# Patient Record
Sex: FEMALE | Race: WHITE | Employment: OTHER | URBAN - METROPOLITAN AREA
[De-identification: names, ages, dates, MRNs, and addresses within clinical notes are randomized per-mention and may not be internally consistent; named-entity substitution may affect disease eponyms.]

---

## 2018-05-01 ENCOUNTER — HOSPITAL ENCOUNTER (EMERGENCY)
Age: 83
Discharge: HOME | End: 2018-05-01
Attending: EMERGENCY MEDICINE
Payer: MEDICARE

## 2018-05-01 VITALS
RESPIRATION RATE: 16 BRPM | HEART RATE: 82 BPM | HEIGHT: 63 IN | WEIGHT: 165 LBS | SYSTOLIC BLOOD PRESSURE: 160 MMHG | DIASTOLIC BLOOD PRESSURE: 88 MMHG | BODY MASS INDEX: 29.23 KG/M2 | TEMPERATURE: 97.8 F | OXYGEN SATURATION: 93 %

## 2018-05-01 DIAGNOSIS — N30.01 ACUTE CYSTITIS WITH HEMATURIA: Primary | ICD-10-CM

## 2018-05-01 LAB
BACTERIA: ABNORMAL /HPF
BILIRUBIN URINE: NEGATIVE
BLOOD, URINE: ABNORMAL
CLARITY: ABNORMAL
COLOR: ABNORMAL
GLUCOSE URINE: NEGATIVE MG/DL
KETONES, URINE: NEGATIVE MG/DL
LEUKOCYTE ESTERASE, URINE: ABNORMAL
NITRITE, URINE: NEGATIVE
PH UA: 5.5 (ref 5–9)
PROTEIN UA: 30 MG/DL
RBC UA: ABNORMAL /HPF (ref 0–2)
SPECIFIC GRAVITY UA: 1.02 (ref 1–1.03)
UROBILINOGEN, URINE: 0.2 E.U./DL
WBC UA: ABNORMAL /HPF (ref 0–5)

## 2018-05-01 PROCEDURE — 99283 EMERGENCY DEPT VISIT LOW MDM: CPT

## 2018-05-01 PROCEDURE — 81001 URINALYSIS AUTO W/SCOPE: CPT

## 2018-05-01 RX ORDER — CEFDINIR 300 MG/1
300 CAPSULE ORAL 2 TIMES DAILY
Qty: 20 CAPSULE | Refills: 0 | Status: SHIPPED | OUTPATIENT
Start: 2018-05-01 | End: 2018-05-11

## 2018-05-01 RX ORDER — PHENAZOPYRIDINE HYDROCHLORIDE 100 MG/1
200 TABLET, FILM COATED ORAL 3 TIMES DAILY PRN
Qty: 9 TABLET | Refills: 0 | Status: SHIPPED | OUTPATIENT
Start: 2018-05-01 | End: 2018-05-04

## 2018-05-01 ASSESSMENT — PAIN DESCRIPTION - FREQUENCY
FREQUENCY: INTERMITTENT
FREQUENCY: INTERMITTENT

## 2018-05-01 ASSESSMENT — PAIN SCALES - GENERAL
PAINLEVEL_OUTOF10: 7
PAINLEVEL_OUTOF10: 7

## 2018-05-01 ASSESSMENT — PAIN DESCRIPTION - DESCRIPTORS
DESCRIPTORS: CRAMPING
DESCRIPTORS: CRAMPING

## 2018-05-01 ASSESSMENT — PAIN DESCRIPTION - PAIN TYPE
TYPE: ACUTE PAIN
TYPE: ACUTE PAIN

## 2018-08-20 ENCOUNTER — HOSPITAL ENCOUNTER (OUTPATIENT)
Age: 83
Discharge: HOME | End: 2018-08-22
Payer: MEDICARE

## 2018-08-20 PROCEDURE — 88112 CYTOPATH CELL ENHANCE TECH: CPT

## 2018-09-06 ENCOUNTER — OFFICE VISIT (OUTPATIENT)
Dept: VASCULAR SURGERY | Age: 83
End: 2018-09-06
Payer: MEDICARE

## 2018-09-06 DIAGNOSIS — I70.1 LEFT RENAL ARTERY STENOSIS (HCC): ICD-10-CM

## 2018-09-06 PROCEDURE — 99204 OFFICE O/P NEW MOD 45 MIN: CPT | Performed by: SURGERY

## 2018-09-06 NOTE — PROGRESS NOTES
Social History Narrative    No narrative on file       Review of Systems:  Skin:  No abnormal pigmentation or rash  Eyes:  No blurring, diplopia or vision loss  Ears/Nose/Throat:  No hearing loss or vertigo  Respiratory:  No cough, pleuritic chest pain, dyspnea, or wheezing  Cardiovascular: No angina, palpitations   Gastrointestinal:  No nausea or vomiting; no abdominal pain or rectal bleeding  Musculoskeletal:  No arthritis or weakness  Neurologic:  No paralysis, paresis, paresthesia, seizures or headaches  Hematologic/Lymphatic/Immunologic:  No anemia, abnormal bleeding/bruising, fever, chills or night sweats. Endocrine:  No heat or cold intolerance. No polyphagia, polydipsia or polyuria. Physical Exam:  General appearance:  Alert, awake, oriented x 3. No distress. Skin:  Warm and dry  Head:  Normocephalic. No masses, lesions or tenderness  Eyes:  Conjunctivae appear normal; PERRL  Ears:  External ears normal  Nose/Sinuses:  Septum midline, mucosa normal; no drainage  Oropharynx:  Clear, no exudate noted  Neck:  No jugular venous distention, lymphadenopathy or thyromegaly. No evidence of carotid bruit  Lungs:  Clear to ausculation bilaterally. No rhonchi, crackles, wheezes  Heart:  Regular rate and rhythm. No rub or murmur  Abdomen:  Soft, non-tender. No masses, organomegaly. Musculoskeletal : No joint effusions, tenderness swelling    Neuro: Speech is intact. Moving all extremities. No focal motor or sensory deficits      Extremities:  Both feet are warm to touch. The color of both feet is normal.        Pulses Right  Left    Brachial 3 3    Radial    3=normal   Femoral 2 2  2=diminished   Popliteal    1=barely palpable   Dorsalis pedis    0=absent   Posterior tibial    4=aneurysmal             Other pertinent information:1. The past medical records were reviewed.     2.  The renal artery ultrasound done at [facility] imaging was personally reviewed by me the findings are consistent with

## 2018-10-08 ENCOUNTER — HOSPITAL ENCOUNTER (OUTPATIENT)
Age: 83
Discharge: HOME | End: 2018-10-10
Payer: MEDICARE

## 2018-10-08 PROCEDURE — 87088 URINE BACTERIA CULTURE: CPT

## 2018-10-10 LAB — URINE CULTURE, ROUTINE: NORMAL

## 2019-12-11 ENCOUNTER — HOSPITAL ENCOUNTER (OUTPATIENT)
Dept: MAMMOGRAPHY | Age: 84
Discharge: HOME | End: 2019-12-13
Payer: MEDICARE

## 2019-12-11 DIAGNOSIS — Z13.820 ENCOUNTER FOR IMAGING TO ASSESS OSTEOPOROSIS: ICD-10-CM

## 2019-12-11 PROCEDURE — 77080 DXA BONE DENSITY AXIAL: CPT

## 2020-06-06 ENCOUNTER — APPOINTMENT (OUTPATIENT)
Dept: CT IMAGING | Age: 85
End: 2020-06-06
Payer: MEDICARE

## 2020-06-06 ENCOUNTER — HOSPITAL ENCOUNTER (OUTPATIENT)
Age: 85
Setting detail: OBSERVATION
Discharge: HOME | End: 2020-06-07
Attending: EMERGENCY MEDICINE | Admitting: FAMILY MEDICINE
Payer: MEDICARE

## 2020-06-06 ENCOUNTER — APPOINTMENT (OUTPATIENT)
Dept: GENERAL RADIOLOGY | Age: 85
End: 2020-06-06
Payer: MEDICARE

## 2020-06-06 PROBLEM — R09.02 HYPOXIA: Status: ACTIVE | Noted: 2020-06-06

## 2020-06-06 LAB
ADENOVIRUS BY PCR: NOT DETECTED
ANION GAP SERPL CALCULATED.3IONS-SCNC: 10 MMOL/L (ref 7–16)
BACTERIA: ABNORMAL /HPF
BASOPHILS ABSOLUTE: 0.02 E9/L (ref 0–0.2)
BASOPHILS RELATIVE PERCENT: 0.3 % (ref 0–2)
BILIRUBIN URINE: NEGATIVE
BLOOD, URINE: ABNORMAL
BORDETELLA PARAPERTUSSIS BY PCR: NOT DETECTED
BORDETELLA PERTUSSIS BY PCR: NOT DETECTED
BUN BLDV-MCNC: 37 MG/DL (ref 8–23)
CALCIUM SERPL-MCNC: 11 MG/DL (ref 8.6–10.2)
CHLAMYDOPHILIA PNEUMONIAE BY PCR: NOT DETECTED
CHLORIDE BLD-SCNC: 100 MMOL/L (ref 98–107)
CLARITY: CLEAR
CO2: 27 MMOL/L (ref 22–29)
COLOR: YELLOW
CORONAVIRUS 229E BY PCR: NOT DETECTED
CORONAVIRUS HKU1 BY PCR: NOT DETECTED
CORONAVIRUS NL63 BY PCR: NOT DETECTED
CORONAVIRUS OC43 BY PCR: NOT DETECTED
CREAT SERPL-MCNC: 0.9 MG/DL (ref 0.5–1)
EOSINOPHILS ABSOLUTE: 0.13 E9/L (ref 0.05–0.5)
EOSINOPHILS RELATIVE PERCENT: 2 % (ref 0–6)
GFR AFRICAN AMERICAN: 52
GFR AFRICAN AMERICAN: >60
GFR NON-AFRICAN AMERICAN: 43 ML/MIN/1.73
GFR NON-AFRICAN AMERICAN: 60 ML/MIN/1.73
GLUCOSE BLD-MCNC: 115 MG/DL (ref 74–99)
GLUCOSE BLD-MCNC: 117 MG/DL (ref 74–99)
GLUCOSE URINE: NEGATIVE MG/DL
HCT VFR BLD CALC: 48.1 % (ref 34–48)
HEMOGLOBIN: 15.7 G/DL (ref 11.5–15.5)
HUMAN METAPNEUMOVIRUS BY PCR: NOT DETECTED
HUMAN RHINOVIRUS/ENTEROVIRUS BY PCR: NOT DETECTED
IMMATURE GRANULOCYTES #: 0.02 E9/L
IMMATURE GRANULOCYTES %: 0.3 % (ref 0–5)
INFLUENZA A BY PCR: NOT DETECTED
INFLUENZA B BY PCR: NOT DETECTED
KETONES, URINE: NEGATIVE MG/DL
LEUKOCYTE ESTERASE, URINE: ABNORMAL
LYMPHOCYTES ABSOLUTE: 2.18 E9/L (ref 1.5–4)
LYMPHOCYTES RELATIVE PERCENT: 33 % (ref 20–42)
MCH RBC QN AUTO: 29.5 PG (ref 26–35)
MCHC RBC AUTO-ENTMCNC: 32.6 % (ref 32–34.5)
MCV RBC AUTO: 90.2 FL (ref 80–99.9)
METER GLUCOSE: 107 MG/DL (ref 74–99)
MONOCYTES ABSOLUTE: 0.53 E9/L (ref 0.1–0.95)
MONOCYTES RELATIVE PERCENT: 8 % (ref 2–12)
MYCOPLASMA PNEUMONIAE BY PCR: NOT DETECTED
NEUTROPHILS ABSOLUTE: 3.72 E9/L (ref 1.8–7.3)
NEUTROPHILS RELATIVE PERCENT: 56.4 % (ref 43–80)
NITRITE, URINE: NEGATIVE
PARAINFLUENZA VIRUS 1 BY PCR: NOT DETECTED
PARAINFLUENZA VIRUS 2 BY PCR: NOT DETECTED
PARAINFLUENZA VIRUS 3 BY PCR: NOT DETECTED
PARAINFLUENZA VIRUS 4 BY PCR: NOT DETECTED
PDW BLD-RTO: 12.8 FL (ref 11.5–15)
PERFORMED ON: ABNORMAL
PH UA: 7 (ref 5–9)
PLATELET # BLD: 245 E9/L (ref 130–450)
PMV BLD AUTO: 10.8 FL (ref 7–12)
POC CHLORIDE: 107 MMOL/L (ref 100–108)
POC CREATININE: 1.2 MG/DL (ref 0.5–1)
POC POTASSIUM: 5 MMOL/L (ref 3.5–5)
POC SODIUM: 141 MMOL/L (ref 132–146)
POTASSIUM REFLEX MAGNESIUM: 4.2 MMOL/L (ref 3.5–5)
PRO-BNP: 725 PG/ML (ref 0–450)
PROTEIN UA: 100 MG/DL
RBC # BLD: 5.33 E12/L (ref 3.5–5.5)
RBC UA: ABNORMAL /HPF (ref 0–2)
RESPIRATORY SYNCYTIAL VIRUS BY PCR: NOT DETECTED
SARS-COV-2, NAAT: NOT DETECTED
SODIUM BLD-SCNC: 137 MMOL/L (ref 132–146)
SPECIFIC GRAVITY UA: 1.02 (ref 1–1.03)
TROPONIN: <0.01 NG/ML (ref 0–0.03)
UROBILINOGEN, URINE: 0.2 E.U./DL
WBC # BLD: 6.6 E9/L (ref 4.5–11.5)
WBC UA: ABNORMAL /HPF (ref 0–5)

## 2020-06-06 PROCEDURE — 85025 COMPLETE CBC W/AUTO DIFF WBC: CPT

## 2020-06-06 PROCEDURE — 84295 ASSAY OF SERUM SODIUM: CPT

## 2020-06-06 PROCEDURE — 94760 N-INVAS EAR/PLS OXIMETRY 1: CPT

## 2020-06-06 PROCEDURE — 71045 X-RAY EXAM CHEST 1 VIEW: CPT

## 2020-06-06 PROCEDURE — 82947 ASSAY GLUCOSE BLOOD QUANT: CPT

## 2020-06-06 PROCEDURE — 82962 GLUCOSE BLOOD TEST: CPT

## 2020-06-06 PROCEDURE — 96374 THER/PROPH/DIAG INJ IV PUSH: CPT

## 2020-06-06 PROCEDURE — 99285 EMERGENCY DEPT VISIT HI MDM: CPT

## 2020-06-06 PROCEDURE — 71275 CT ANGIOGRAPHY CHEST: CPT

## 2020-06-06 PROCEDURE — 0100U HC RESPIRPTHGN MULT REV TRANS & AMP PRB TECH 21 TRGT: CPT

## 2020-06-06 PROCEDURE — 84132 ASSAY OF SERUM POTASSIUM: CPT

## 2020-06-06 PROCEDURE — G0378 HOSPITAL OBSERVATION PER HR: HCPCS

## 2020-06-06 PROCEDURE — 81001 URINALYSIS AUTO W/SCOPE: CPT

## 2020-06-06 PROCEDURE — 6360000002 HC RX W HCPCS: Performed by: STUDENT IN AN ORGANIZED HEALTH CARE EDUCATION/TRAINING PROGRAM

## 2020-06-06 PROCEDURE — 82565 ASSAY OF CREATININE: CPT

## 2020-06-06 PROCEDURE — U0002 COVID-19 LAB TEST NON-CDC: HCPCS

## 2020-06-06 PROCEDURE — 83880 ASSAY OF NATRIURETIC PEPTIDE: CPT

## 2020-06-06 PROCEDURE — 84484 ASSAY OF TROPONIN QUANT: CPT

## 2020-06-06 PROCEDURE — 6360000002 HC RX W HCPCS: Performed by: EMERGENCY MEDICINE

## 2020-06-06 PROCEDURE — 96375 TX/PRO/DX INJ NEW DRUG ADDON: CPT

## 2020-06-06 PROCEDURE — 73070 X-RAY EXAM OF ELBOW: CPT

## 2020-06-06 PROCEDURE — 80048 BASIC METABOLIC PNL TOTAL CA: CPT

## 2020-06-06 PROCEDURE — 70450 CT HEAD/BRAIN W/O DYE: CPT

## 2020-06-06 PROCEDURE — 82435 ASSAY OF BLOOD CHLORIDE: CPT

## 2020-06-06 PROCEDURE — 6360000004 HC RX CONTRAST MEDICATION: Performed by: RADIOLOGY

## 2020-06-06 PROCEDURE — 72125 CT NECK SPINE W/O DYE: CPT

## 2020-06-06 PROCEDURE — 93005 ELECTROCARDIOGRAM TRACING: CPT | Performed by: STUDENT IN AN ORGANIZED HEALTH CARE EDUCATION/TRAINING PROGRAM

## 2020-06-06 RX ORDER — LOSARTAN POTASSIUM 50 MG/1
100 TABLET ORAL DAILY
Status: DISCONTINUED | OUTPATIENT
Start: 2020-06-07 | End: 2020-06-07 | Stop reason: HOSPADM

## 2020-06-06 RX ORDER — FENTANYL CITRATE 50 UG/ML
25 INJECTION, SOLUTION INTRAMUSCULAR; INTRAVENOUS ONCE
Status: COMPLETED | OUTPATIENT
Start: 2020-06-06 | End: 2020-06-06

## 2020-06-06 RX ORDER — LOSARTAN POTASSIUM AND HYDROCHLOROTHIAZIDE 12.5; 1 MG/1; MG/1
1 TABLET ORAL DAILY
Status: DISCONTINUED | OUTPATIENT
Start: 2020-06-07 | End: 2020-06-06 | Stop reason: CLARIF

## 2020-06-06 RX ORDER — IPRATROPIUM BROMIDE AND ALBUTEROL SULFATE 2.5; .5 MG/3ML; MG/3ML
1 SOLUTION RESPIRATORY (INHALATION)
Status: DISCONTINUED | OUTPATIENT
Start: 2020-06-07 | End: 2020-06-07 | Stop reason: HOSPADM

## 2020-06-06 RX ORDER — FUROSEMIDE 10 MG/ML
20 INJECTION INTRAMUSCULAR; INTRAVENOUS ONCE
Status: COMPLETED | OUTPATIENT
Start: 2020-06-06 | End: 2020-06-06

## 2020-06-06 RX ORDER — LOSARTAN POTASSIUM 100 MG/1
100 TABLET ORAL DAILY
COMMUNITY

## 2020-06-06 RX ORDER — SODIUM CHLORIDE 0.9 % (FLUSH) 0.9 %
10 SYRINGE (ML) INJECTION PRN
Status: DISCONTINUED | OUTPATIENT
Start: 2020-06-06 | End: 2020-06-07 | Stop reason: HOSPADM

## 2020-06-06 RX ORDER — HYDROCHLOROTHIAZIDE 12.5 MG/1
12.5 TABLET ORAL DAILY
Status: ON HOLD | COMMUNITY
End: 2020-10-11 | Stop reason: HOSPADM

## 2020-06-06 RX ORDER — HYDROCHLOROTHIAZIDE 12.5 MG/1
12.5 TABLET ORAL DAILY
Status: DISCONTINUED | OUTPATIENT
Start: 2020-06-07 | End: 2020-06-07 | Stop reason: HOSPADM

## 2020-06-06 RX ADMIN — FENTANYL CITRATE 25 MCG: 50 INJECTION, SOLUTION INTRAMUSCULAR; INTRAVENOUS at 17:02

## 2020-06-06 RX ADMIN — FUROSEMIDE 20 MG: 10 INJECTION, SOLUTION INTRAMUSCULAR; INTRAVENOUS at 20:36

## 2020-06-06 RX ADMIN — IOPAMIDOL 60 ML: 755 INJECTION, SOLUTION INTRAVENOUS at 18:25

## 2020-06-06 ASSESSMENT — ENCOUNTER SYMPTOMS
APNEA: 0
SORE THROAT: 0
BACK PAIN: 0
WHEEZING: 0
SHORTNESS OF BREATH: 0
ABDOMINAL PAIN: 0
NAUSEA: 0
EYE PAIN: 0
TROUBLE SWALLOWING: 0
CONSTIPATION: 0
RHINORRHEA: 0
CHEST TIGHTNESS: 0
PHOTOPHOBIA: 0
DIARRHEA: 0
COUGH: 0
VOMITING: 0

## 2020-06-06 ASSESSMENT — PAIN DESCRIPTION - ONSET
ONSET: ON-GOING
ONSET: ON-GOING

## 2020-06-06 ASSESSMENT — PAIN DESCRIPTION - PAIN TYPE
TYPE: CHRONIC PAIN
TYPE: ACUTE PAIN

## 2020-06-06 ASSESSMENT — PAIN DESCRIPTION - LOCATION
LOCATION: HEAD;ELBOW
LOCATION: BACK

## 2020-06-06 ASSESSMENT — PAIN DESCRIPTION - FREQUENCY
FREQUENCY: CONTINUOUS
FREQUENCY: CONTINUOUS

## 2020-06-06 ASSESSMENT — PAIN DESCRIPTION - ORIENTATION: ORIENTATION: RIGHT;POSTERIOR

## 2020-06-06 ASSESSMENT — PAIN SCALES - GENERAL
PAINLEVEL_OUTOF10: 5
PAINLEVEL_OUTOF10: 10

## 2020-06-06 NOTE — ED PROVIDER NOTES
[address]      Pt Name: Bartolome Chase  MRN: [id number]  [place] 1935  Date of evaluation: 6/6/2020      CHIEF COMPLAINT       Chief Complaint   Patient presents with    Fall     @ [employer], leg gave out, hit head and right elbow, -LOC, -thinners, GCS 15, per ems 86%RA. HPI  Bartolome Chase is a 80 y.o. female with a history of diabetes, hypertension, who presents to the emergency department after a fall. She states that she was walking in her house and suddenly her legs gave out and she fell backwards ground. She states that she may have hit her head and hit her right elbow. Currently complaining of head pain and right elbow pain. The pain is constant, moderate, nothing makes it better or worse, achy, nonradiating. She also complains of mild neck pain. She denies any lightheadedness, vision changes, chest pain preceding the fall. She did not trip just insisting that her bilateral leg suddenly became more weak causing her to go to the ground. She does complain of 1 day of mild shortness of breath. She states this is new for her. EMS apparently found her hypoxic with an oxygen saturation of 88% and placed her on nasal cannula supplemental oxygen. No history of any underlying pulmonary problems. She does not smoke. Denies any leg swelling or leg pain. Except as noted above the remainder of the review of systems was reviewed and negative. Review of Systems   Constitutional: Negative for chills, diaphoresis, fatigue and fever. Reports fall. HENT: Negative for rhinorrhea, sore throat and trouble swallowing. Reports head pain. Eyes: Negative for photophobia and pain. Respiratory: Negative for apnea, cough, chest tightness, shortness of breath and wheezing. Cardiovascular: Negative for chest pain, palpitations and leg swelling.    Gastrointestinal: Negative for abdominal

## 2020-06-07 VITALS
OXYGEN SATURATION: 92 % | DIASTOLIC BLOOD PRESSURE: 76 MMHG | WEIGHT: 163.8 LBS | HEART RATE: 86 BPM | HEIGHT: 68 IN | SYSTOLIC BLOOD PRESSURE: 145 MMHG | RESPIRATION RATE: 18 BRPM | TEMPERATURE: 97.7 F | BODY MASS INDEX: 24.83 KG/M2

## 2020-06-07 LAB
ANION GAP SERPL CALCULATED.3IONS-SCNC: 13 MMOL/L (ref 7–16)
BUN BLDV-MCNC: 35 MG/DL (ref 8–23)
CALCIUM SERPL-MCNC: 10.6 MG/DL (ref 8.6–10.2)
CHLORIDE BLD-SCNC: 100 MMOL/L (ref 98–107)
CO2: 30 MMOL/L (ref 22–29)
CREAT SERPL-MCNC: 1.1 MG/DL (ref 0.5–1)
EKG ATRIAL RATE: 93 BPM
EKG P AXIS: 55 DEGREES
EKG P-R INTERVAL: 168 MS
EKG Q-T INTERVAL: 392 MS
EKG QRS DURATION: 132 MS
EKG QTC CALCULATION (BAZETT): 487 MS
EKG R AXIS: -40 DEGREES
EKG T AXIS: 75 DEGREES
EKG VENTRICULAR RATE: 93 BPM
GFR AFRICAN AMERICAN: 57
GFR NON-AFRICAN AMERICAN: 47 ML/MIN/1.73
GLUCOSE BLD-MCNC: 132 MG/DL (ref 74–99)
HCT VFR BLD CALC: 44.1 % (ref 34–48)
HEMOGLOBIN: 14.5 G/DL (ref 11.5–15.5)
MCH RBC QN AUTO: 29.4 PG (ref 26–35)
MCHC RBC AUTO-ENTMCNC: 32.9 % (ref 32–34.5)
MCV RBC AUTO: 89.5 FL (ref 80–99.9)
PDW BLD-RTO: 12.8 FL (ref 11.5–15)
PLATELET # BLD: 228 E9/L (ref 130–450)
PMV BLD AUTO: 10.5 FL (ref 7–12)
POTASSIUM SERPL-SCNC: 3.5 MMOL/L (ref 3.5–5)
RBC # BLD: 4.93 E12/L (ref 3.5–5.5)
SODIUM BLD-SCNC: 143 MMOL/L (ref 132–146)
WBC # BLD: 7.4 E9/L (ref 4.5–11.5)

## 2020-06-07 PROCEDURE — 2580000003 HC RX 258: Performed by: FAMILY MEDICINE

## 2020-06-07 PROCEDURE — 36415 COLL VENOUS BLD VENIPUNCTURE: CPT

## 2020-06-07 PROCEDURE — 6370000000 HC RX 637 (ALT 250 FOR IP): Performed by: FAMILY MEDICINE

## 2020-06-07 PROCEDURE — 94640 AIRWAY INHALATION TREATMENT: CPT

## 2020-06-07 PROCEDURE — G0378 HOSPITAL OBSERVATION PER HR: HCPCS

## 2020-06-07 PROCEDURE — 85027 COMPLETE CBC AUTOMATED: CPT

## 2020-06-07 PROCEDURE — 99219 PR INITIAL OBSERVATION CARE/DAY 50 MINUTES: CPT | Performed by: INTERNAL MEDICINE

## 2020-06-07 PROCEDURE — 93010 ELECTROCARDIOGRAM REPORT: CPT | Performed by: INTERNAL MEDICINE

## 2020-06-07 PROCEDURE — 80048 BASIC METABOLIC PNL TOTAL CA: CPT

## 2020-06-07 RX ORDER — HYDROCODONE BITARTRATE AND ACETAMINOPHEN 5; 325 MG/1; MG/1
1 TABLET ORAL EVERY 4 HOURS PRN
Status: DISCONTINUED | OUTPATIENT
Start: 2020-06-07 | End: 2020-06-07 | Stop reason: HOSPADM

## 2020-06-07 RX ADMIN — HYDROCHLOROTHIAZIDE 12.5 MG: 12.5 TABLET ORAL at 10:47

## 2020-06-07 RX ADMIN — METFORMIN HYDROCHLORIDE 500 MG: 500 TABLET ORAL at 10:46

## 2020-06-07 RX ADMIN — HYDROCODONE BITARTRATE AND ACETAMINOPHEN 1 TABLET: 5; 325 TABLET ORAL at 03:27

## 2020-06-07 RX ADMIN — SODIUM CHLORIDE, PRESERVATIVE FREE 10 ML: 5 INJECTION INTRAVENOUS at 10:47

## 2020-06-07 RX ADMIN — IPRATROPIUM BROMIDE AND ALBUTEROL SULFATE 1 AMPULE: .5; 3 SOLUTION RESPIRATORY (INHALATION) at 13:51

## 2020-06-07 RX ADMIN — LOSARTAN POTASSIUM 100 MG: 50 TABLET, FILM COATED ORAL at 10:46

## 2020-06-07 RX ADMIN — IPRATROPIUM BROMIDE AND ALBUTEROL SULFATE 1 AMPULE: .5; 3 SOLUTION RESPIRATORY (INHALATION) at 09:34

## 2020-06-07 ASSESSMENT — PAIN DESCRIPTION - PAIN TYPE
TYPE: ACUTE PAIN
TYPE: ACUTE PAIN

## 2020-06-07 ASSESSMENT — PAIN DESCRIPTION - ONSET
ONSET: AWAKENED FROM SLEEP
ONSET: ON-GOING

## 2020-06-07 ASSESSMENT — ENCOUNTER SYMPTOMS
ABDOMINAL PAIN: 0
SHORTNESS OF BREATH: 0

## 2020-06-07 ASSESSMENT — PAIN - FUNCTIONAL ASSESSMENT: PAIN_FUNCTIONAL_ASSESSMENT: ACTIVITIES ARE NOT PREVENTED

## 2020-06-07 ASSESSMENT — PAIN DESCRIPTION - ORIENTATION: ORIENTATION: POSTERIOR

## 2020-06-07 ASSESSMENT — PAIN SCALES - GENERAL
PAINLEVEL_OUTOF10: 5
PAINLEVEL_OUTOF10: 5

## 2020-06-07 ASSESSMENT — PAIN DESCRIPTION - LOCATION
LOCATION: HEAD
LOCATION: GENERALIZED

## 2020-06-07 ASSESSMENT — PAIN DESCRIPTION - FREQUENCY
FREQUENCY: CONTINUOUS
FREQUENCY: INTERMITTENT

## 2020-06-07 ASSESSMENT — PAIN DESCRIPTION - DESCRIPTORS: DESCRIPTORS: CONSTANT;ACHING

## 2020-06-07 ASSESSMENT — PAIN DESCRIPTION - PROGRESSION: CLINICAL_PROGRESSION: GRADUALLY IMPROVING

## 2020-06-07 NOTE — ED NOTES
Ambulated patient with stand by assist and use of walker. Steady gait. Denies Dizziness, SOB, CP.  Spo2 monitored throughout ambulation and dropping to 89% on RA. No s/sx of respiratory distress noted or verbalized. Assisted back into bed with Spo2 increasing to 93% on RA.       Faizan Younger RN  06/06/20 2004

## 2020-06-07 NOTE — PROGRESS NOTES
Dr. John Rawls notified via perfect serve regarding pulmonology signing off, will await orders.     Alok Oliveira RN

## 2020-06-07 NOTE — PROGRESS NOTES
Patient okay to discharge from pulmonology standpoint per Dr. Mera Padilla. Patient to follow up in 3 months.     Cami Maloner, RN

## 2020-06-07 NOTE — H&P
Gloria Rouse is an 80 y.o.  female. Patient says she went to the store yesterday for the first time. She was walking around and says her legs became weak and she fell to the ground and hit her head. She was found in the ER to require 4L oxygen although she has no respiratory problems in her history. Past Medical History:   Diagnosis Date    Diabetes ([address]     Hypertension     Left renal artery stenosis (HCC) 2018    Renal artery stenosis (HCC)      Past Surgical History:   Procedure Laterality Date    BLEPHAROPLASTY Bilateral     CHOLECYSTECTOMY      COLONOSCOPY      EYE SURGERY Right     cataract    HAND SURGERY Bilateral     trigger thumb    HYSTERECTOMY      KNEE SURGERY         No family history on file. Social History     Tobacco Use    Smoking status: Former Smoker     Packs/day: 0.50     Types: Cigarettes     Last attempt to quit: 3/31/2020     Years since quittin.1    Smokeless tobacco: Never Used   Substance Use Topics    Alcohol use: No     Comment: rare    Drug use: No       Current Facility-Administered Medications   Medication Dose Route Frequency Provider Last Rate Last Dose    HYDROcodone-acetaminophen (NORCO) 5-325 MG per tablet 1 tablet  1 tablet Oral Q4H PRN Justen Mendoza MD   1 tablet at 20 0327    sodium chloride flush 0.9 % injection 10 mL  10 mL Intravenous PRN Justen Mendoza MD        metFORMIN (GLUCOPHAGE) tablet 500 mg  500 mg Oral BID WC Justen Mendoza MD        ipratropium-albuterol (DUONEB) nebulizer solution 1 ampule  1 ampule Inhalation Q4H WA Justen Mendoza MD        losartan (COZAAR) tablet 100 mg  100 mg Oral Daily Justen Mendoza MD        And    hydrochlorothiazide (HYDRODIURIL) tablet 12.5 mg  12.5 mg Oral Daily Justen Mendoza MD           Allergies: No Known Allergies    Active Problems:    Hypoxia  Resolved Problems:    * No resolved hospital problems.  *    Blood pressure (!)

## 2020-08-18 ENCOUNTER — OFFICE VISIT (OUTPATIENT)
Dept: ENT CLINIC | Age: 85
End: 2020-08-18
Payer: MEDICARE

## 2020-08-18 VITALS — WEIGHT: 160 LBS | HEIGHT: 63 IN | BODY MASS INDEX: 28.35 KG/M2 | TEMPERATURE: 97.7 F

## 2020-08-18 PROCEDURE — 99204 OFFICE O/P NEW MOD 45 MIN: CPT | Performed by: OTOLARYNGOLOGY

## 2020-08-18 PROCEDURE — 69210 REMOVE IMPACTED EAR WAX UNI: CPT | Performed by: OTOLARYNGOLOGY

## 2020-08-18 RX ORDER — FLUTICASONE PROPIONATE 50 MCG
2 SPRAY, SUSPENSION (ML) NASAL DAILY
Qty: 1 BOTTLE | Refills: 3 | Status: SHIPPED
Start: 2020-08-18 | End: 2022-05-30

## 2020-08-18 ASSESSMENT — ENCOUNTER SYMPTOMS
COLOR CHANGE: 0
EYE DISCHARGE: 0
TROUBLE SWALLOWING: 0
EYES NEGATIVE: 1
SHORTNESS OF BREATH: 0
VOMITING: 0
CHEST TIGHTNESS: 0
APNEA: 0
DIARRHEA: 0
GASTROINTESTINAL NEGATIVE: 1
EYE PAIN: 0
SORE THROAT: 1
VOICE CHANGE: 0
FACIAL SWELLING: 0
RESPIRATORY NEGATIVE: 1
ABDOMINAL PAIN: 0

## 2020-08-18 NOTE — PROGRESS NOTES
Subjective:     Patient ID:  Cinda Cortez is a 80 y.o. female. HPI:    Pt presents for problems with the throat. Thepatient feels something in their throat and it bothers them every time they swallow. The patient is hoarse: No          Symptoms of gastroesophageal reflux is noted. GERD  Paitent complains of heartburn. This has been associated with chest pain. She denies abdominal bloating and belching. Symptoms have been present for 1 year. She denies dysphagia. She has not lost weight. She denies melena, hematochezia, hematemesis, and coffee ground emesis. Medical therapy in the past has included none. Pt had scope for reflux 6 months ago which did not show reflux. PT has been taking omeprazole for a few months with relief of the symptoms. Symptoms of allergic rhinitis is not noted. Smoking history: smoker  (1 ppd x 40 yrs) still smokes occasionally  ETOH: Rarely    Past Medical History:   Diagnosis Date    Diabetes ([address]     Hypertension     Left renal artery stenosis ([facility] 2018    Renal artery stenosis (HCC)      Past Surgical History:   Procedure Laterality Date    BLEPHAROPLASTY Bilateral     CHOLECYSTECTOMY      COLONOSCOPY      EYE SURGERY Right     cataract    HAND SURGERY Bilateral     trigger thumb    HYSTERECTOMY      KNEE SURGERY       History reviewed. No pertinent family history. Social History     Socioeconomic History    Marital status:       Spouse name: None    Number of children: None    Years of education: None    Highest education level: None   Occupational History    None   Social Needs    Financial resource strain: None    Food insecurity     Worry: None     Inability: None    Transportation needs     Medical: None     Non-medical: None   Tobacco Use    Smoking status: Former Smoker     Packs/day: 0.50     Types: Cigarettes     Last attempt to quit: 3/31/2020     Years since quittin.3    Smokeless tobacco: Never Used   Substance and Sexual Activity    Alcohol use: No     Comment: rare    Drug use: No    Sexual activity: Not Currently   Lifestyle    Physical activity     Days per week: None     Minutes per session: None    Stress: None   Relationships    Social connections     Talks on phone: None     Gets together: None     Attends [employer] service: None     Active member of club or organization: None     Attends meetings of clubs or organizations: None     Relationship status: None    Intimate partner violence     Fear of current or ex partner: None     Emotionally abused: None     Physically abused: None     Forced sexual activity: None   Other Topics Concern    None   Social History Narrative    None     No Known Allergies      Review of Systems   Constitutional: Negative. Negative for appetite change. HENT: Positive for sore throat. Negative for congestion, ear discharge, facial swelling, trouble swallowing and voice change. Eyes: Negative. Negative for pain, discharge and visual disturbance. Respiratory: Negative. Negative for apnea, chest tightness and shortness of breath. Cardiovascular: Negative. Negative for chest pain, palpitations and leg swelling. Gastrointestinal: Negative. Negative for abdominal pain, diarrhea and vomiting. Endocrine: Negative for cold intolerance, heat intolerance and polydipsia. Genitourinary: Negative. Negative for dysuria, flank pain and hematuria. Musculoskeletal: Negative. Negative for arthralgias, gait problem and neck pain. Skin: Negative. Negative for color change, pallor and rash. Allergic/Immunologic: Negative for environmental allergies, food allergies and immunocompromised state. Neurological: Negative. Negative for dizziness, numbness and headaches. Hematological: Negative for adenopathy. Psychiatric/Behavioral: Negative. Negative for behavioral problems and hallucinations. All other systems reviewed and are negative.               Objective: Physical Exam  Vitals signs and nursing note reviewed. Constitutional:       Appearance: She is well-developed. HENT:      Head: Normocephalic and atraumatic. Right Ear: Hearing, tympanic membrane, ear canal and external ear normal. There is impacted cerumen. Left Ear: Hearing, tympanic membrane, ear canal and external ear normal. There is impacted cerumen. Nose: Nose normal.      Right Turbinates: Enlarged, swollen and pale. Left Turbinates: Enlarged, swollen and pale. Eyes:      Conjunctiva/sclera: Conjunctivae normal.      Pupils: Pupils are equal, round, and reactive to light. Neck:      Musculoskeletal: Normal range of motion and neck supple. Cardiovascular:      Rate and Rhythm: Normal rate and regular rhythm. Heart sounds: Normal heart sounds. Pulmonary:      Effort: Pulmonary effort is normal.      Breath sounds: Normal breath sounds. Abdominal:      General: Bowel sounds are normal.      Palpations: Abdomen is soft. Skin:     General: Skin is warm and dry. Neurological:      Mental Status: She is alert and oriented to person, place, and time. Cerumen removal      Auditory canal(s) both ears completely obstructed with cerumen. A microscope was not used . Cerumen was gently removed using soft plastic curette, suction. Tympanic membranes are intact following the procedure. Auditory canals appear normal.            Assessment:       Diagnosis Orders   1. Globus sensation               Plan:     Allergic rhinitis  I would like the patient to start  fluticasone (Flonase) and have instructed them to use it daily at bedtime. Call or return to clinic prn if these symptoms worsen or fail to improve as anticipated.     Continue the reflux meds   Follow up in 1 month(s)

## 2020-09-26 ENCOUNTER — HOSPITAL ENCOUNTER (EMERGENCY)
Age: 85
Discharge: HOME | End: 2020-09-26
Attending: EMERGENCY MEDICINE
Payer: MEDICARE

## 2020-09-26 ENCOUNTER — APPOINTMENT (OUTPATIENT)
Dept: GENERAL RADIOLOGY | Age: 85
End: 2020-09-26
Payer: MEDICARE

## 2020-09-26 VITALS
BODY MASS INDEX: 28.35 KG/M2 | TEMPERATURE: 98 F | HEART RATE: 74 BPM | DIASTOLIC BLOOD PRESSURE: 92 MMHG | RESPIRATION RATE: 16 BRPM | SYSTOLIC BLOOD PRESSURE: 168 MMHG | OXYGEN SATURATION: 95 % | HEIGHT: 63 IN | WEIGHT: 160 LBS

## 2020-09-26 LAB
ALBUMIN SERPL-MCNC: 3.8 G/DL (ref 3.5–5.2)
ALP BLD-CCNC: 126 U/L (ref 35–104)
ALT SERPL-CCNC: 12 U/L (ref 0–32)
ANION GAP SERPL CALCULATED.3IONS-SCNC: 13 MMOL/L (ref 7–16)
AST SERPL-CCNC: 16 U/L (ref 0–31)
BASOPHILS ABSOLUTE: 0.05 E9/L (ref 0–0.2)
BASOPHILS RELATIVE PERCENT: 0.8 % (ref 0–2)
BILIRUB SERPL-MCNC: 0.3 MG/DL (ref 0–1.2)
BUN BLDV-MCNC: 26 MG/DL (ref 8–23)
CALCIUM SERPL-MCNC: 9.9 MG/DL (ref 8.6–10.2)
CHLORIDE BLD-SCNC: 100 MMOL/L (ref 98–107)
CO2: 28 MMOL/L (ref 22–29)
CREAT SERPL-MCNC: 1 MG/DL (ref 0.5–1)
EOSINOPHILS ABSOLUTE: 0.09 E9/L (ref 0.05–0.5)
EOSINOPHILS RELATIVE PERCENT: 1.4 % (ref 0–6)
GFR AFRICAN AMERICAN: >60
GFR NON-AFRICAN AMERICAN: 53 ML/MIN/1.73
GLUCOSE BLD-MCNC: 140 MG/DL (ref 74–99)
HCT VFR BLD CALC: 43.6 % (ref 34–48)
HEMOGLOBIN: 14.1 G/DL (ref 11.5–15.5)
IMMATURE GRANULOCYTES #: 0.01 E9/L
IMMATURE GRANULOCYTES %: 0.2 % (ref 0–5)
LYMPHOCYTES ABSOLUTE: 1.81 E9/L (ref 1.5–4)
LYMPHOCYTES RELATIVE PERCENT: 28 % (ref 20–42)
MCH RBC QN AUTO: 29.6 PG (ref 26–35)
MCHC RBC AUTO-ENTMCNC: 32.3 % (ref 32–34.5)
MCV RBC AUTO: 91.6 FL (ref 80–99.9)
MONOCYTES ABSOLUTE: 0.55 E9/L (ref 0.1–0.95)
MONOCYTES RELATIVE PERCENT: 8.5 % (ref 2–12)
NEUTROPHILS ABSOLUTE: 3.96 E9/L (ref 1.8–7.3)
NEUTROPHILS RELATIVE PERCENT: 61.1 % (ref 43–80)
PDW BLD-RTO: 13.1 FL (ref 11.5–15)
PLATELET # BLD: 222 E9/L (ref 130–450)
PMV BLD AUTO: 10.2 FL (ref 7–12)
POTASSIUM SERPL-SCNC: 3.6 MMOL/L (ref 3.5–5)
PRO-BNP: 1169 PG/ML (ref 0–450)
RBC # BLD: 4.76 E12/L (ref 3.5–5.5)
SODIUM BLD-SCNC: 141 MMOL/L (ref 132–146)
TOTAL PROTEIN: 6.5 G/DL (ref 6.4–8.3)
TROPONIN: <0.01 NG/ML (ref 0–0.03)
WBC # BLD: 6.5 E9/L (ref 4.5–11.5)

## 2020-09-26 PROCEDURE — 6360000002 HC RX W HCPCS: Performed by: EMERGENCY MEDICINE

## 2020-09-26 PROCEDURE — 83880 ASSAY OF NATRIURETIC PEPTIDE: CPT

## 2020-09-26 PROCEDURE — 80053 COMPREHEN METABOLIC PANEL: CPT

## 2020-09-26 PROCEDURE — 96374 THER/PROPH/DIAG INJ IV PUSH: CPT

## 2020-09-26 PROCEDURE — 84484 ASSAY OF TROPONIN QUANT: CPT

## 2020-09-26 PROCEDURE — C9113 INJ PANTOPRAZOLE SODIUM, VIA: HCPCS | Performed by: EMERGENCY MEDICINE

## 2020-09-26 PROCEDURE — 6370000000 HC RX 637 (ALT 250 FOR IP): Performed by: EMERGENCY MEDICINE

## 2020-09-26 PROCEDURE — 93005 ELECTROCARDIOGRAM TRACING: CPT | Performed by: EMERGENCY MEDICINE

## 2020-09-26 PROCEDURE — 85025 COMPLETE CBC W/AUTO DIFF WBC: CPT

## 2020-09-26 PROCEDURE — 71045 X-RAY EXAM CHEST 1 VIEW: CPT

## 2020-09-26 PROCEDURE — 99283 EMERGENCY DEPT VISIT LOW MDM: CPT

## 2020-09-26 RX ORDER — PANTOPRAZOLE SODIUM 40 MG/1
40 TABLET, DELAYED RELEASE ORAL DAILY
Qty: 10 TABLET | Refills: 0 | Status: ON HOLD | OUTPATIENT
Start: 2020-09-26 | End: 2020-10-11 | Stop reason: HOSPADM

## 2020-09-26 RX ORDER — PANTOPRAZOLE SODIUM 40 MG/10ML
40 INJECTION, POWDER, LYOPHILIZED, FOR SOLUTION INTRAVENOUS ONCE
Status: COMPLETED | OUTPATIENT
Start: 2020-09-26 | End: 2020-09-26

## 2020-09-26 RX ADMIN — LIDOCAINE HYDROCHLORIDE: 20 SOLUTION ORAL; TOPICAL at 14:53

## 2020-09-26 RX ADMIN — PANTOPRAZOLE SODIUM 40 MG: 40 INJECTION, POWDER, FOR SOLUTION INTRAVENOUS at 15:41

## 2020-09-26 ASSESSMENT — PAIN DESCRIPTION - LOCATION: LOCATION: THROAT

## 2020-09-26 ASSESSMENT — PAIN DESCRIPTION - PAIN TYPE: TYPE: ACUTE PAIN

## 2020-09-26 ASSESSMENT — PAIN SCALES - GENERAL: PAINLEVEL_OUTOF10: 3

## 2020-09-26 NOTE — ED PROVIDER NOTES
Department of Emergency Medicine   ED  Provider Note  Admit Date/RoomTime: 9/26/2020  1:58 PM  ED Room: 10/10          History of Present Illness:  9/26/20, Time: 3:41 PM EDT  Chief Complaint   Patient presents with    Pharyngitis     sent by PMD states that her throat gets irritated after eating also her throat hurts worse when laying down                Jacque Bearden is a 80 y.o. female presenting to the ED for throat pain. Patient is had a burning sensation in the back of her throat and across her chest for the past several months. It is worse after she eats, worse when she lies flat. Denies any injury or trauma. She is having it minimally now. She has had an EGD, she does not remember the EGD showed, she was started on Carafate, this was several months ago. She is evaluate ENT, and was started on medications. She said the pain is been severe over the past couple of days, call her PCP, and instructed to present here. Patient also mentioned she takes ibuprofen every day for chronic back pain, she states that several pills, she cannot specify how much. She denies any fever, chills, cough, sputum, shortness of breath, nausea, vomiting, change in bowel bladder, neck pain or stiffness, back pain, or any other symptoms    Review of Systems:   Pertinent positives and negatives are stated within HPI, all other systems reviewed and are negative.        --------------------------------------------- PAST HISTORY ---------------------------------------------  Past Medical History:  has a past medical history of Diabetes ([address] Hypertension, Left renal artery stenosis ([address] and Renal artery stenosis ([address] Past Surgical History:  has a past surgical history that includes Hysterectomy; Cholecystectomy; Eye surgery (Right); Hand surgery (Bilateral); knee surgery; blepharoplasty (Bilateral); and Colonoscopy. Social History:  reports that she quit smoking about 5 months ago.  Her smoking use included cigarettes. She smoked 0.50 packs per day. She has never used smokeless tobacco. She reports that she does not drink alcohol or use drugs. Family History: family history is not on file. . Unless otherwise noted, family history is non contributory    The patients home medications have been reviewed. Allergies: Patient has no known allergies. ---------------------------------------------------PHYSICAL EXAM--------------------------------------    Constitutional/General: Alert and oriented x3  Head: Normocephalic and atraumatic  Eyes: PERRL, EOMI, sclera non icteric  Mouth: Oropharynx clear, handling secretions, no trismus, no asymmetry of the posterior oropharynx or uvular edema  Neck: Supple, full ROM, no stridor, no meningeal signs  Respiratory: Lungs clear to auscultation bilaterally, no wheezes, rales, or rhonchi. Not in respiratory distress  Cardiovascular:  Regular rate. Regular rhythm. 2+ distal pulses. Equal extremity pulses. Chest: No chest wall tenderness  GI:  Abdomen Soft, Non tender, Non distended. No rebound, guarding, or rigidity. No pulsatile masses. Musculoskeletal: Moves all extremities x 4. Warm and well perfused, no clubbing, cyanosis, or edema. Capillary refill <3 seconds  Integument: skin warm and dry. No rashes. Neurologic: GCS 15, no focal deficits, symmetric strength 5/5 in the upper and lower extremities bilaterally  Psychiatric: Normal Affect          -------------------------------------------------- RESULTS -------------------------------------------------  I have personally reviewed all laboratory and imaging results for this patient. Results are listed below.      LABS: (Lab results interpreted by me)  Results for orders placed or performed during the hospital encounter of 09/26/20   CBC Auto Differential   Result Value Ref Range    WBC 6.5 4.5 - 11.5 E9/L    RBC 4.76 3.50 - 5.50 E12/L    Hemoglobin 14.1 11.5 - 15.5 g/dL    Hematocrit 43.6 34.0 - 48.0 %    MCV 91.6 80.0 - 99.9 fL    MCH 29.6 26.0 - 35.0 pg    MCHC 32.3 32.0 - 34.5 %    RDW 13.1 11.5 - 15.0 fL    Platelets 406 248 - 284 E9/L    MPV 10.2 7.0 - 12.0 fL    Neutrophils % 61.1 43.0 - 80.0 %    Immature Granulocytes % 0.2 0.0 - 5.0 %    Lymphocytes % 28.0 20.0 - 42.0 %    Monocytes % 8.5 2.0 - 12.0 %    Eosinophils % 1.4 0.0 - 6.0 %    Basophils % 0.8 0.0 - 2.0 %    Neutrophils Absolute 3.96 1.80 - 7.30 E9/L    Immature Granulocytes # 0.01 E9/L    Lymphocytes Absolute 1.81 1.50 - 4.00 E9/L    Monocytes Absolute 0.55 0.10 - 0.95 E9/L    Eosinophils Absolute 0.09 0.05 - 0.50 E9/L    Basophils Absolute 0.05 0.00 - 0.20 E9/L   Comprehensive Metabolic Panel   Result Value Ref Range    Sodium 141 132 - 146 mmol/L    Potassium 3.6 3.5 - 5.0 mmol/L    Chloride 100 98 - 107 mmol/L    CO2 28 22 - 29 mmol/L    Anion Gap 13 7 - 16 mmol/L    Glucose 140 (H) 74 - 99 mg/dL    BUN 26 (H) 8 - 23 mg/dL    CREATININE 1.0 0.5 - 1.0 mg/dL    GFR Non-African American 53 >=60 mL/min/1.73    GFR African American >60     Calcium 9.9 8.6 - 10.2 mg/dL    Total Protein 6.5 6.4 - 8.3 g/dL    Alb 3.8 3.5 - 5.2 g/dL    Total Bilirubin 0.3 0.0 - 1.2 mg/dL    Alkaline Phosphatase 126 (H) 35 - 104 U/L    ALT 12 0 - 32 U/L    AST 16 0 - 31 U/L   Troponin   Result Value Ref Range    Troponin <0.01 0.00 - 0.03 ng/mL   Brain Natriuretic Peptide   Result Value Ref Range    Pro-BNP 1,169 (H) 0 - 450 pg/mL   ,       RADIOLOGY:  Interpreted by Radiologist unless otherwise specified  XR CHEST PORTABLE   Final Result      No evidence for acute cardiopulmonary process.                   EKG Interpretation  Interpreted by emergency department physician, Dr. Karla Williamson     Sinus rhythm, rate 81, no significant change when compared with previous study      ------------------------- NURSING NOTES AND VITALS REVIEWED ---------------------------   The nursing notes within the ED encounter and vital signs as below have been reviewed by myself  BP (!) 168/92 Comment: Manual Pulse 74   Temp 98 °F (36.7 °C) (Temporal)   Resp 16   Ht 5' 3\" (1.6 m)   Wt 160 lb (72.6 kg)   SpO2 95%   BMI 28.34 kg/m²     Oxygen Saturation Interpretation: Normal    The patients available past medical records and past encounters were reviewed. ------------------------------ ED COURSE/MEDICAL DECISION MAKING----------------------  Medications   aluminum & magnesium hydroxide-simethicone (MAALOX) 30 mL, lidocaine viscous hcl (XYLOCAINE) 5 mL (GI COCKTAIL) ( Oral Given 9/26/20 9033)   pantoprazole (PROTONIX) injection 40 mg (40 mg Intravenous Given 9/26/20 1541)           The cardiac monitor revealed sinus with a heart rate in the 80s as interpreted by me. The cardiac monitor was ordered secondary to the patient's throat pain and to monitor the patient for dysrhythmia. CPT 11174         Medical Decision Making:    Labs and imaging reviewed. Reevaluation, patient's resting, symptoms improved after GI cocktail. History and presentation not consistent with a cardiac etiology. Patient be started on Protonix. She is educated on signs and symptoms that require emergent evaluation. She is to follow-up with her PCP in 1 to 2 days. Counseling: The emergency provider has spoken with the patient and discussed todays results, in addition to providing specific details for the plan of care and counseling regarding the diagnosis and prognosis. Questions are answered at this time and they are agreeable with the plan.       --------------------------------- IMPRESSION AND DISPOSITION ---------------------------------    IMPRESSION  1. Pharyngitis, unspecified etiology        DISPOSITION  Disposition: Discharge to home  Patient condition is stable        NOTE: This report was transcribed using voice recognition software.  Every effort was made to ensure accuracy; however, inadvertent computerized transcription errors may be present       Tea Dumont MD  09/27/20 9867

## 2020-09-27 LAB
EKG ATRIAL RATE: 81 BPM
EKG P AXIS: 41 DEGREES
EKG P-R INTERVAL: 148 MS
EKG Q-T INTERVAL: 422 MS
EKG QRS DURATION: 134 MS
EKG QTC CALCULATION (BAZETT): 490 MS
EKG R AXIS: -39 DEGREES
EKG T AXIS: 8 DEGREES
EKG VENTRICULAR RATE: 81 BPM

## 2020-09-27 PROCEDURE — 93010 ELECTROCARDIOGRAM REPORT: CPT | Performed by: INTERNAL MEDICINE

## 2020-10-08 ENCOUNTER — HOSPITAL ENCOUNTER (INPATIENT)
Age: 85
LOS: 5 days | Discharge: ANOTHER HOSPITAL | DRG: 281 | End: 2020-10-14
Attending: EMERGENCY MEDICINE | Admitting: INTERNAL MEDICINE
Payer: MEDICARE

## 2020-10-08 PROCEDURE — 2580000003 HC RX 258: Performed by: EMERGENCY MEDICINE

## 2020-10-08 PROCEDURE — 36415 COLL VENOUS BLD VENIPUNCTURE: CPT

## 2020-10-08 PROCEDURE — 84484 ASSAY OF TROPONIN QUANT: CPT

## 2020-10-08 PROCEDURE — 96375 TX/PRO/DX INJ NEW DRUG ADDON: CPT

## 2020-10-08 PROCEDURE — 85025 COMPLETE CBC W/AUTO DIFF WBC: CPT

## 2020-10-08 PROCEDURE — 80053 COMPREHEN METABOLIC PANEL: CPT

## 2020-10-08 PROCEDURE — 99283 EMERGENCY DEPT VISIT LOW MDM: CPT

## 2020-10-08 PROCEDURE — 2500000003 HC RX 250 WO HCPCS: Performed by: EMERGENCY MEDICINE

## 2020-10-08 PROCEDURE — 83690 ASSAY OF LIPASE: CPT

## 2020-10-08 PROCEDURE — 6370000000 HC RX 637 (ALT 250 FOR IP): Performed by: EMERGENCY MEDICINE

## 2020-10-08 PROCEDURE — 83605 ASSAY OF LACTIC ACID: CPT

## 2020-10-08 PROCEDURE — 96365 THER/PROPH/DIAG IV INF INIT: CPT

## 2020-10-08 RX ORDER — 0.9 % SODIUM CHLORIDE 0.9 %
500 INTRAVENOUS SOLUTION INTRAVENOUS ONCE
Status: COMPLETED | OUTPATIENT
Start: 2020-10-08 | End: 2020-10-09

## 2020-10-08 RX ORDER — EZETIMIBE 10 MG/1
10 TABLET ORAL DAILY
Status: ON HOLD | COMMUNITY
End: 2020-10-11 | Stop reason: HOSPADM

## 2020-10-08 RX ORDER — SUCRALFATE 1 G/1
1 TABLET ORAL 4 TIMES DAILY
Status: ON HOLD | COMMUNITY
End: 2020-10-11 | Stop reason: HOSPADM

## 2020-10-08 RX ADMIN — FAMOTIDINE 20 MG: 10 INJECTION INTRAVENOUS at 23:55

## 2020-10-08 RX ADMIN — LIDOCAINE HYDROCHLORIDE: 20 SOLUTION ORAL; TOPICAL at 23:53

## 2020-10-08 RX ADMIN — SODIUM CHLORIDE 500 ML: 9 INJECTION, SOLUTION INTRAVENOUS at 23:55

## 2020-10-08 ASSESSMENT — PAIN SCALES - GENERAL: PAINLEVEL_OUTOF10: 10

## 2020-10-08 ASSESSMENT — PAIN DESCRIPTION - PAIN TYPE: TYPE: ACUTE PAIN

## 2020-10-09 ENCOUNTER — APPOINTMENT (OUTPATIENT)
Dept: NUCLEAR MEDICINE | Age: 85
DRG: 281 | End: 2020-10-09
Payer: MEDICARE

## 2020-10-09 ENCOUNTER — APPOINTMENT (OUTPATIENT)
Dept: NON INVASIVE DIAGNOSTICS | Age: 85
DRG: 281 | End: 2020-10-09
Payer: MEDICARE

## 2020-10-09 ENCOUNTER — APPOINTMENT (OUTPATIENT)
Dept: GENERAL RADIOLOGY | Age: 85
DRG: 281 | End: 2020-10-09
Payer: MEDICARE

## 2020-10-09 PROBLEM — I45.10 RIGHT BUNDLE BRANCH BLOCK: Status: ACTIVE | Noted: 2020-10-09

## 2020-10-09 PROBLEM — I48.91 ATRIAL FIBRILLATION WITH RAPID VENTRICULAR RESPONSE (HCC): Status: ACTIVE | Noted: 2020-10-09

## 2020-10-09 PROBLEM — I21.4 NON-STEMI (NON-ST ELEVATED MYOCARDIAL INFARCTION) (HCC): Status: ACTIVE | Noted: 2020-10-09

## 2020-10-09 LAB
ABO/RH: NORMAL
ALBUMIN SERPL-MCNC: 3.6 G/DL (ref 3.5–5.2)
ALBUMIN SERPL-MCNC: 4 G/DL (ref 3.5–5.2)
ALP BLD-CCNC: 114 U/L (ref 35–104)
ALP BLD-CCNC: 132 U/L (ref 35–104)
ALT SERPL-CCNC: 19 U/L (ref 0–32)
ALT SERPL-CCNC: 22 U/L (ref 0–32)
ANION GAP SERPL CALCULATED.3IONS-SCNC: 15 MMOL/L (ref 7–16)
ANION GAP SERPL CALCULATED.3IONS-SCNC: 16 MMOL/L (ref 7–16)
ANTIBODY SCREEN: NORMAL
AST SERPL-CCNC: 21 U/L (ref 0–31)
AST SERPL-CCNC: 24 U/L (ref 0–31)
BASOPHILS ABSOLUTE: 0.03 E9/L (ref 0–0.2)
BASOPHILS ABSOLUTE: 0.05 E9/L (ref 0–0.2)
BASOPHILS RELATIVE PERCENT: 0.4 % (ref 0–2)
BASOPHILS RELATIVE PERCENT: 0.4 % (ref 0–2)
BILIRUB SERPL-MCNC: 0.3 MG/DL (ref 0–1.2)
BILIRUB SERPL-MCNC: 0.4 MG/DL (ref 0–1.2)
BUN BLDV-MCNC: 26 MG/DL (ref 8–23)
BUN BLDV-MCNC: 29 MG/DL (ref 8–23)
CALCIUM SERPL-MCNC: 10.1 MG/DL (ref 8.6–10.2)
CALCIUM SERPL-MCNC: 11 MG/DL (ref 8.6–10.2)
CHLORIDE BLD-SCNC: 98 MMOL/L (ref 98–107)
CHLORIDE BLD-SCNC: 99 MMOL/L (ref 98–107)
CHOLESTEROL, TOTAL: 176 MG/DL (ref 0–199)
CO2: 25 MMOL/L (ref 22–29)
CO2: 28 MMOL/L (ref 22–29)
CREAT SERPL-MCNC: 1 MG/DL (ref 0.5–1)
CREAT SERPL-MCNC: 1 MG/DL (ref 0.5–1)
EKG ATRIAL RATE: 119 BPM
EKG ATRIAL RATE: 93 BPM
EKG P AXIS: 46 DEGREES
EKG P-R INTERVAL: 208 MS
EKG Q-T INTERVAL: 294 MS
EKG Q-T INTERVAL: 392 MS
EKG QRS DURATION: 134 MS
EKG QRS DURATION: 136 MS
EKG QTC CALCULATION (BAZETT): 424 MS
EKG QTC CALCULATION (BAZETT): 487 MS
EKG R AXIS: -48 DEGREES
EKG R AXIS: -69 DEGREES
EKG T AXIS: -26 DEGREES
EKG T AXIS: 9 DEGREES
EKG VENTRICULAR RATE: 125 BPM
EKG VENTRICULAR RATE: 93 BPM
EOSINOPHILS ABSOLUTE: 0.02 E9/L (ref 0.05–0.5)
EOSINOPHILS ABSOLUTE: 0.02 E9/L (ref 0.05–0.5)
EOSINOPHILS RELATIVE PERCENT: 0.2 % (ref 0–6)
EOSINOPHILS RELATIVE PERCENT: 0.2 % (ref 0–6)
GFR AFRICAN AMERICAN: >60
GFR AFRICAN AMERICAN: >60
GFR NON-AFRICAN AMERICAN: 53 ML/MIN/1.73
GFR NON-AFRICAN AMERICAN: 53 ML/MIN/1.73
GLUCOSE BLD-MCNC: 144 MG/DL (ref 74–99)
GLUCOSE BLD-MCNC: 177 MG/DL (ref 74–99)
HCT VFR BLD CALC: 42 % (ref 34–48)
HCT VFR BLD CALC: 48.1 % (ref 34–48)
HDLC SERPL-MCNC: 54 MG/DL
HEMOGLOBIN: 14 G/DL (ref 11.5–15.5)
HEMOGLOBIN: 16.1 G/DL (ref 11.5–15.5)
IMMATURE GRANULOCYTES #: 0.03 E9/L
IMMATURE GRANULOCYTES #: 0.06 E9/L
IMMATURE GRANULOCYTES %: 0.4 % (ref 0–5)
IMMATURE GRANULOCYTES %: 0.5 % (ref 0–5)
LACTIC ACID: 1.8 MMOL/L (ref 0.5–2.2)
LDL CHOLESTEROL CALCULATED: 84 MG/DL (ref 0–99)
LIPASE: 24 U/L (ref 13–60)
LYMPHOCYTES ABSOLUTE: 1.48 E9/L (ref 1.5–4)
LYMPHOCYTES ABSOLUTE: 1.98 E9/L (ref 1.5–4)
LYMPHOCYTES RELATIVE PERCENT: 12.9 % (ref 20–42)
LYMPHOCYTES RELATIVE PERCENT: 23.7 % (ref 20–42)
MAGNESIUM: 1.2 MG/DL (ref 1.6–2.6)
MCH RBC QN AUTO: 30 PG (ref 26–35)
MCH RBC QN AUTO: 30.5 PG (ref 26–35)
MCHC RBC AUTO-ENTMCNC: 33.3 % (ref 32–34.5)
MCHC RBC AUTO-ENTMCNC: 33.5 % (ref 32–34.5)
MCV RBC AUTO: 89.9 FL (ref 80–99.9)
MCV RBC AUTO: 91.1 FL (ref 80–99.9)
METER GLUCOSE: 137 MG/DL (ref 74–99)
METER GLUCOSE: 173 MG/DL (ref 74–99)
METER GLUCOSE: 188 MG/DL (ref 74–99)
MONOCYTES ABSOLUTE: 0.53 E9/L (ref 0.1–0.95)
MONOCYTES ABSOLUTE: 0.6 E9/L (ref 0.1–0.95)
MONOCYTES RELATIVE PERCENT: 4.6 % (ref 2–12)
MONOCYTES RELATIVE PERCENT: 7.2 % (ref 2–12)
NEUTROPHILS ABSOLUTE: 5.71 E9/L (ref 1.8–7.3)
NEUTROPHILS ABSOLUTE: 9.3 E9/L (ref 1.8–7.3)
NEUTROPHILS RELATIVE PERCENT: 68.1 % (ref 43–80)
NEUTROPHILS RELATIVE PERCENT: 81.4 % (ref 43–80)
PDW BLD-RTO: 12.7 FL (ref 11.5–15)
PDW BLD-RTO: 12.7 FL (ref 11.5–15)
PLATELET # BLD: 242 E9/L (ref 130–450)
PLATELET # BLD: 272 E9/L (ref 130–450)
PMV BLD AUTO: 10.3 FL (ref 7–12)
PMV BLD AUTO: 10.4 FL (ref 7–12)
POTASSIUM REFLEX MAGNESIUM: 3.5 MMOL/L (ref 3.5–5)
POTASSIUM SERPL-SCNC: 3.6 MMOL/L (ref 3.5–5)
PRO-BNP: 2310 PG/ML (ref 0–450)
RBC # BLD: 4.67 E12/L (ref 3.5–5.5)
RBC # BLD: 5.28 E12/L (ref 3.5–5.5)
SODIUM BLD-SCNC: 139 MMOL/L (ref 132–146)
SODIUM BLD-SCNC: 142 MMOL/L (ref 132–146)
TOTAL PROTEIN: 6.1 G/DL (ref 6.4–8.3)
TOTAL PROTEIN: 7.5 G/DL (ref 6.4–8.3)
TRIGL SERPL-MCNC: 191 MG/DL (ref 0–149)
TROPONIN: 0.15 NG/ML (ref 0–0.03)
TROPONIN: 0.29 NG/ML (ref 0–0.03)
TROPONIN: 0.44 NG/ML (ref 0–0.03)
TSH SERPL DL<=0.05 MIU/L-ACNC: 1.73 UIU/ML (ref 0.27–4.2)
TSH SERPL DL<=0.05 MIU/L-ACNC: 2.54 UIU/ML (ref 0.27–4.2)
VLDLC SERPL CALC-MCNC: 38 MG/DL
WBC # BLD: 11.4 E9/L (ref 4.5–11.5)
WBC # BLD: 8.4 E9/L (ref 4.5–11.5)

## 2020-10-09 PROCEDURE — C1769 GUIDE WIRE: HCPCS

## 2020-10-09 PROCEDURE — 84484 ASSAY OF TROPONIN QUANT: CPT

## 2020-10-09 PROCEDURE — 93010 ELECTROCARDIOGRAM REPORT: CPT | Performed by: INTERNAL MEDICINE

## 2020-10-09 PROCEDURE — 2500000003 HC RX 250 WO HCPCS

## 2020-10-09 PROCEDURE — 6360000002 HC RX W HCPCS: Performed by: INTERNAL MEDICINE

## 2020-10-09 PROCEDURE — 6370000000 HC RX 637 (ALT 250 FOR IP): Performed by: EMERGENCY MEDICINE

## 2020-10-09 PROCEDURE — 71045 X-RAY EXAM CHEST 1 VIEW: CPT

## 2020-10-09 PROCEDURE — 93005 ELECTROCARDIOGRAM TRACING: CPT | Performed by: EMERGENCY MEDICINE

## 2020-10-09 PROCEDURE — 2500000003 HC RX 250 WO HCPCS: Performed by: EMERGENCY MEDICINE

## 2020-10-09 PROCEDURE — 36415 COLL VENOUS BLD VENIPUNCTURE: CPT

## 2020-10-09 PROCEDURE — C1894 INTRO/SHEATH, NON-LASER: HCPCS

## 2020-10-09 PROCEDURE — 83880 ASSAY OF NATRIURETIC PEPTIDE: CPT

## 2020-10-09 PROCEDURE — B2151ZZ FLUOROSCOPY OF LEFT HEART USING LOW OSMOLAR CONTRAST: ICD-10-PCS | Performed by: INTERNAL MEDICINE

## 2020-10-09 PROCEDURE — 2580000003 HC RX 258: Performed by: INTERNAL MEDICINE

## 2020-10-09 PROCEDURE — 93458 L HRT ARTERY/VENTRICLE ANGIO: CPT | Performed by: INTERNAL MEDICINE

## 2020-10-09 PROCEDURE — 84443 ASSAY THYROID STIM HORMONE: CPT

## 2020-10-09 PROCEDURE — A9500 TC99M SESTAMIBI: HCPCS | Performed by: RADIOLOGY

## 2020-10-09 PROCEDURE — 2140000000 HC CCU INTERMEDIATE R&B

## 2020-10-09 PROCEDURE — 6360000002 HC RX W HCPCS

## 2020-10-09 PROCEDURE — 6370000000 HC RX 637 (ALT 250 FOR IP): Performed by: INTERNAL MEDICINE

## 2020-10-09 PROCEDURE — 82962 GLUCOSE BLOOD TEST: CPT

## 2020-10-09 PROCEDURE — 83735 ASSAY OF MAGNESIUM: CPT

## 2020-10-09 PROCEDURE — 80053 COMPREHEN METABOLIC PANEL: CPT

## 2020-10-09 PROCEDURE — 3430000000 HC RX DIAGNOSTIC RADIOPHARMACEUTICAL: Performed by: RADIOLOGY

## 2020-10-09 PROCEDURE — 86850 RBC ANTIBODY SCREEN: CPT

## 2020-10-09 PROCEDURE — 2709999900 HC NON-CHARGEABLE SUPPLY

## 2020-10-09 PROCEDURE — 4A023N7 MEASUREMENT OF CARDIAC SAMPLING AND PRESSURE, LEFT HEART, PERCUTANEOUS APPROACH: ICD-10-PCS | Performed by: INTERNAL MEDICINE

## 2020-10-09 PROCEDURE — 6370000000 HC RX 637 (ALT 250 FOR IP)

## 2020-10-09 PROCEDURE — 85025 COMPLETE CBC W/AUTO DIFF WBC: CPT

## 2020-10-09 PROCEDURE — 6360000002 HC RX W HCPCS: Performed by: EMERGENCY MEDICINE

## 2020-10-09 PROCEDURE — 80061 LIPID PANEL: CPT

## 2020-10-09 PROCEDURE — 86900 BLOOD TYPING SEROLOGIC ABO: CPT

## 2020-10-09 PROCEDURE — 86901 BLOOD TYPING SEROLOGIC RH(D): CPT

## 2020-10-09 PROCEDURE — B2111ZZ FLUOROSCOPY OF MULTIPLE CORONARY ARTERIES USING LOW OSMOLAR CONTRAST: ICD-10-PCS | Performed by: INTERNAL MEDICINE

## 2020-10-09 RX ORDER — NICOTINE POLACRILEX 4 MG
15 LOZENGE BUCCAL PRN
Status: DISCONTINUED | OUTPATIENT
Start: 2020-10-09 | End: 2020-10-14 | Stop reason: HOSPADM

## 2020-10-09 RX ORDER — SODIUM CHLORIDE 0.9 % (FLUSH) 0.9 %
10 SYRINGE (ML) INJECTION PRN
Status: DISCONTINUED | OUTPATIENT
Start: 2020-10-09 | End: 2020-10-14 | Stop reason: HOSPADM

## 2020-10-09 RX ORDER — PANTOPRAZOLE SODIUM 40 MG/1
40 TABLET, DELAYED RELEASE ORAL
Status: DISCONTINUED | OUTPATIENT
Start: 2020-10-09 | End: 2020-10-14 | Stop reason: HOSPADM

## 2020-10-09 RX ORDER — ISOSORBIDE MONONITRATE 60 MG/1
60 TABLET, EXTENDED RELEASE ORAL DAILY
Status: DISCONTINUED | OUTPATIENT
Start: 2020-10-09 | End: 2020-10-11

## 2020-10-09 RX ORDER — METOPROLOL SUCCINATE 25 MG/1
25 TABLET, EXTENDED RELEASE ORAL 2 TIMES DAILY
Status: DISCONTINUED | OUTPATIENT
Start: 2020-10-09 | End: 2020-10-11

## 2020-10-09 RX ORDER — IBUPROFEN 400 MG/1
400 TABLET ORAL EVERY 6 HOURS PRN
Status: ON HOLD | COMMUNITY
End: 2020-10-11 | Stop reason: HOSPADM

## 2020-10-09 RX ORDER — MAGNESIUM SULFATE IN WATER 40 MG/ML
2 INJECTION, SOLUTION INTRAVENOUS ONCE
Status: COMPLETED | OUTPATIENT
Start: 2020-10-09 | End: 2020-10-09

## 2020-10-09 RX ORDER — POTASSIUM CHLORIDE 20 MEQ/1
40 TABLET, EXTENDED RELEASE ORAL PRN
Status: DISCONTINUED | OUTPATIENT
Start: 2020-10-09 | End: 2020-10-14 | Stop reason: HOSPADM

## 2020-10-09 RX ORDER — SODIUM CHLORIDE 0.9 % (FLUSH) 0.9 %
10 SYRINGE (ML) INJECTION EVERY 12 HOURS SCHEDULED
Status: DISCONTINUED | OUTPATIENT
Start: 2020-10-09 | End: 2020-10-14 | Stop reason: HOSPADM

## 2020-10-09 RX ORDER — METOPROLOL SUCCINATE 25 MG/1
25 TABLET, EXTENDED RELEASE ORAL DAILY
Status: DISCONTINUED | OUTPATIENT
Start: 2020-10-09 | End: 2020-10-09

## 2020-10-09 RX ORDER — ONDANSETRON 2 MG/ML
4 INJECTION INTRAMUSCULAR; INTRAVENOUS EVERY 6 HOURS PRN
Status: DISCONTINUED | OUTPATIENT
Start: 2020-10-09 | End: 2020-10-14 | Stop reason: HOSPADM

## 2020-10-09 RX ORDER — PROMETHAZINE HYDROCHLORIDE 25 MG/1
12.5 TABLET ORAL EVERY 6 HOURS PRN
Status: DISCONTINUED | OUTPATIENT
Start: 2020-10-09 | End: 2020-10-14 | Stop reason: HOSPADM

## 2020-10-09 RX ORDER — SODIUM CHLORIDE 9 MG/ML
INJECTION, SOLUTION INTRAVENOUS CONTINUOUS
Status: DISCONTINUED | OUTPATIENT
Start: 2020-10-09 | End: 2020-10-14 | Stop reason: HOSPADM

## 2020-10-09 RX ORDER — DEXTROSE MONOHYDRATE 25 G/50ML
12.5 INJECTION, SOLUTION INTRAVENOUS PRN
Status: DISCONTINUED | OUTPATIENT
Start: 2020-10-09 | End: 2020-10-14 | Stop reason: HOSPADM

## 2020-10-09 RX ORDER — LOSARTAN POTASSIUM 50 MG/1
100 TABLET ORAL DAILY
Status: DISCONTINUED | OUTPATIENT
Start: 2020-10-09 | End: 2020-10-14 | Stop reason: HOSPADM

## 2020-10-09 RX ORDER — EZETIMIBE 10 MG/1
10 TABLET ORAL NIGHTLY
Status: DISCONTINUED | OUTPATIENT
Start: 2020-10-09 | End: 2020-10-14 | Stop reason: HOSPADM

## 2020-10-09 RX ORDER — POTASSIUM CHLORIDE 7.45 MG/ML
10 INJECTION INTRAVENOUS PRN
Status: DISCONTINUED | OUTPATIENT
Start: 2020-10-09 | End: 2020-10-14 | Stop reason: HOSPADM

## 2020-10-09 RX ORDER — FUROSEMIDE 10 MG/ML
20 INJECTION INTRAMUSCULAR; INTRAVENOUS ONCE
Status: COMPLETED | OUTPATIENT
Start: 2020-10-09 | End: 2020-10-09

## 2020-10-09 RX ORDER — DILTIAZEM HYDROCHLORIDE 5 MG/ML
10 INJECTION INTRAVENOUS ONCE
Status: COMPLETED | OUTPATIENT
Start: 2020-10-09 | End: 2020-10-09

## 2020-10-09 RX ORDER — CALCIUM CARBONATE 200(500)MG
1000 TABLET,CHEWABLE ORAL 3 TIMES DAILY PRN
Status: DISCONTINUED | OUTPATIENT
Start: 2020-10-09 | End: 2020-10-14 | Stop reason: HOSPADM

## 2020-10-09 RX ORDER — ACETAMINOPHEN 325 MG/1
650 TABLET ORAL EVERY 6 HOURS PRN
Status: DISCONTINUED | OUTPATIENT
Start: 2020-10-09 | End: 2020-10-14 | Stop reason: HOSPADM

## 2020-10-09 RX ORDER — SENNA PLUS 8.6 MG/1
1 TABLET ORAL DAILY PRN
Status: DISCONTINUED | OUTPATIENT
Start: 2020-10-09 | End: 2020-10-14 | Stop reason: HOSPADM

## 2020-10-09 RX ORDER — ACETAMINOPHEN 650 MG/1
650 SUPPOSITORY RECTAL EVERY 6 HOURS PRN
Status: DISCONTINUED | OUTPATIENT
Start: 2020-10-09 | End: 2020-10-14 | Stop reason: HOSPADM

## 2020-10-09 RX ORDER — ASPIRIN 81 MG/1
81 TABLET, CHEWABLE ORAL DAILY
Status: DISCONTINUED | OUTPATIENT
Start: 2020-10-10 | End: 2020-10-14 | Stop reason: HOSPADM

## 2020-10-09 RX ORDER — DEXTROSE MONOHYDRATE 50 MG/ML
100 INJECTION, SOLUTION INTRAVENOUS PRN
Status: DISCONTINUED | OUTPATIENT
Start: 2020-10-09 | End: 2020-10-14 | Stop reason: HOSPADM

## 2020-10-09 RX ORDER — ASPIRIN 81 MG/1
324 TABLET, CHEWABLE ORAL ONCE
Status: COMPLETED | OUTPATIENT
Start: 2020-10-09 | End: 2020-10-09

## 2020-10-09 RX ADMIN — ACETAMINOPHEN 650 MG: 325 TABLET, FILM COATED ORAL at 21:51

## 2020-10-09 RX ADMIN — DEXTROSE MONOHYDRATE 5 MG/HR: 50 INJECTION, SOLUTION INTRAVENOUS at 01:01

## 2020-10-09 RX ADMIN — EZETIMIBE 10 MG: 10 TABLET ORAL at 21:02

## 2020-10-09 RX ADMIN — NITROGLYCERIN 0.3 INCH: 20 OINTMENT TOPICAL at 01:25

## 2020-10-09 RX ADMIN — CALCIUM CARBONATE (ANTACID) CHEW TAB 500 MG 1000 MG: 500 CHEW TAB at 05:34

## 2020-10-09 RX ADMIN — Medication 11 MILLICURIE: at 08:36

## 2020-10-09 RX ADMIN — FUROSEMIDE 20 MG: 10 INJECTION, SOLUTION INTRAVENOUS at 02:31

## 2020-10-09 RX ADMIN — ASPIRIN 81 MG CHEWABLE TABLET 324 MG: 81 TABLET CHEWABLE at 01:21

## 2020-10-09 RX ADMIN — DILTIAZEM HYDROCHLORIDE 10 MG: 5 INJECTION INTRAVENOUS at 01:01

## 2020-10-09 RX ADMIN — PANTOPRAZOLE SODIUM 40 MG: 40 TABLET, DELAYED RELEASE ORAL at 05:34

## 2020-10-09 RX ADMIN — LOSARTAN POTASSIUM 100 MG: 50 TABLET, FILM COATED ORAL at 14:01

## 2020-10-09 RX ADMIN — INSULIN LISPRO 1 UNITS: 100 INJECTION, SOLUTION INTRAVENOUS; SUBCUTANEOUS at 21:30

## 2020-10-09 RX ADMIN — SODIUM CHLORIDE, PRESERVATIVE FREE 10 ML: 5 INJECTION INTRAVENOUS at 21:03

## 2020-10-09 RX ADMIN — ENOXAPARIN SODIUM 70 MG: 100 INJECTION SUBCUTANEOUS at 01:25

## 2020-10-09 RX ADMIN — METOPROLOL SUCCINATE 25 MG: 25 TABLET, EXTENDED RELEASE ORAL at 21:03

## 2020-10-09 RX ADMIN — SODIUM CHLORIDE: 9 INJECTION, SOLUTION INTRAVENOUS at 13:43

## 2020-10-09 RX ADMIN — MAGNESIUM SULFATE 2 G: 2 INJECTION INTRAVENOUS at 10:02

## 2020-10-09 RX ADMIN — INSULIN LISPRO 1 UNITS: 100 INJECTION, SOLUTION INTRAVENOUS; SUBCUTANEOUS at 17:30

## 2020-10-09 RX ADMIN — ISOSORBIDE MONONITRATE 60 MG: 60 TABLET ORAL at 16:16

## 2020-10-09 RX ADMIN — METOPROLOL SUCCINATE 25 MG: 25 TABLET, EXTENDED RELEASE ORAL at 10:02

## 2020-10-09 RX ADMIN — DEXTROSE MONOHYDRATE 5 MG/HR: 50 INJECTION, SOLUTION INTRAVENOUS at 05:30

## 2020-10-09 ASSESSMENT — PAIN SCALES - GENERAL
PAINLEVEL_OUTOF10: 3
PAINLEVEL_OUTOF10: 0

## 2020-10-09 ASSESSMENT — ENCOUNTER SYMPTOMS
BACK PAIN: 1
ABDOMINAL DISTENTION: 0
CHEST TIGHTNESS: 0
SHORTNESS OF BREATH: 0
EYE DISCHARGE: 0
APNEA: 0
WHEEZING: 0
EYE ITCHING: 0
ABDOMINAL PAIN: 0

## 2020-10-09 ASSESSMENT — PAIN DESCRIPTION - LOCATION: LOCATION: THROAT

## 2020-10-09 ASSESSMENT — PAIN DESCRIPTION - PAIN TYPE: TYPE: ACUTE PAIN

## 2020-10-09 ASSESSMENT — HEART SCORE: ECG: 1

## 2020-10-09 NOTE — H&P
History & Physicial  10/09/20  Primary Care:  Hossein Clark DO  [address] [facility] / [facility] [address]        Chief Complaint   Patient presents with    Gastroesophageal Reflux     hx of acid reflux-  points to throat-        HPI:    Patient is an 80year old female who presented to the ER due to a burning pain in her throat that started suddenly yesterday and progressively worsened. She had been taking ppi for gerd but had run out of the medication. She thought the pain was due to this. She has had chest pains with radiation into her shoulders. She currently denies any chest pains, shortness of breath, nausea, vomiting or diarrhea. Prior to Visit Medications    Medication Sig Taking?  Authorizing Provider   ibuprofen (ADVIL;MOTRIN) 400 MG tablet Take 400 mg by mouth every 6 hours as needed for Pain Yes Historical Provider, MD   sucralfate (CARAFATE) 1 GM tablet Take 1 g by mouth 4 times daily Yes Historical Provider, MD   ezetimibe (ZETIA) 10 MG tablet Take 10 mg by mouth daily Yes Historical Provider, MD   fluticasone (FLONASE) 50 MCG/ACT nasal spray 2 sprays by Nasal route daily 2 sprays in each nostril daily Yes Dave Ontiveros DO   losartan (COZAAR) 100 MG tablet Take 100 mg by mouth daily Yes Historical Provider, MD   hydrochlorothiazide (HYDRODIURIL) 12.5 MG tablet Take 12.5 mg by mouth daily Yes Historical Provider, MD   metFORMIN (GLUCOPHAGE) 500 MG tablet Take 500 mg by mouth 2 times daily Yes Historical Provider, MD   NONFORMULARY Take 1 capsule by mouth nightly as needed (for sleep) Midnight  Historical Provider, MD   pantoprazole (PROTONIX) 40 MG tablet Take 1 tablet by mouth daily for 10 days  Rashaun Julian MD     Social History     Tobacco Use    Smoking status: Former Smoker     Packs/day: 0.50     Types: Cigarettes     Last attempt to quit: 3/31/2020     Years since quittin.5    Smokeless tobacco: Never Used   Substance Use Topics    Alcohol use: No     Comment: Normal range of motion and neck supple. Cardiovascular:      Rate and Rhythm: Normal rate and regular rhythm. Heart sounds: Normal heart sounds. Pulmonary:      Effort: Pulmonary effort is normal.      Breath sounds: Normal breath sounds. No wheezing, rhonchi or rales. Abdominal:      General: Bowel sounds are normal. There is no distension. Palpations: Abdomen is soft. Tenderness: There is no abdominal tenderness. There is no guarding. Musculoskeletal:         General: No swelling. Right lower leg: No edema. Left lower leg: No edema. Skin:     General: Skin is warm and dry. Capillary Refill: Capillary refill takes less than 2 seconds. Neurological:      General: No focal deficit present. Mental Status: She is alert and oriented to person, place, and time. Psychiatric:         Mood and Affect: Mood normal.         Behavior: Behavior normal.         Active Problems:    Atrial fibrillation with rapid ventricular response (HCC)  Resolved Problems:    * No resolved hospital problems. *  1. NSTEMI  2. Afib with RVR  3. Diabetes Mellitus Type II   4. Hypertension  5. Tobacco Abuse  6. Lumbar spinal stenosis    Plan:  NPO, Given aspirin, and lovenox in ER. She has not been able to tolerate statins outpatient as has myalgias and concern about memory loss. Appreciate cardiology input.      DVT Prophylaxis: Lovenox  Code Status: Full    Kirby Bergeron DO      Electronically signed by Kirby Bergeron DO on 10/9/2020 at 5:35 AM

## 2020-10-09 NOTE — PROCEDURES
[address]                  [address] [facility]  [facility]                            CARDIAC CATHETERIZATION    PATIENT NAME: Kermit Manuel                    :        1935  MED REC NO:   [id number]                            ROOM:       6501  ACCOUNT NO:   [de-identified]                           ADMIT DATE: 10/08/2020  PROVIDER:     Jv Longoria MD    DATE OF PROCEDURE:  10/09/2020    PROCEDURES:  Left heart catheterization, selective coronary angiography,  and left ventriculography. ROUTE:  The procedure was done through right radial approach using  ultrasound guidance. SEDATION:  The patient received intravenous Versed and intravenous  fentanyl for sedation. INDICATION:  Non-ST-elevation myocardial infarction. PRESSURES:  Aorta 194/79 with a mean of 124. Left ventricular systolic pressure 304. Left ventricular end-diastolic  pressure 13. There was no significant gradient across the aortic valve. CORONARY ANGIOGRAPHY:  Left main. The left main artery is very heavily calcified distally, but  does not appear to have any significant angiographic disease. LAD. The left anterior descending artery is very heavily calcified in  its proximal segment. It is diffusely diseased in its proximal segment  with around 70% focal narrowing. There is around 60% disease in the mid  vessel and 70% disease in the distal vessel. LCX. The left circumflex is heavily calcified in its proximal segment. There is 99% subtotal ostial occlusion of the left circumflex. After  the first marginal branch, there is another 90% to 95% stenosis  involving the ostium of the second marginal branch just after the origin  of the small AV groove branch. RCA. The right coronary artery is a dominant vessel. It is heavily  calcified in its proximal and middle segment.   It is diffusely diseased  in its proximal segment with about 50% to 60% luminal narrowing followed  by 90% to 95% subtotal mid vessel occlusion. The distal vessel did not  appear to have any significant disease. There was around 50% ostial  narrowings of the posterior descending artery branch and the  posterolateral branch (bifurcation lesion). LEFT VENTRICULOGRAPHY:  The left ventricle is normal in size and  contractility with no gross regional wall motion abnormality noted on  the 35-degree KOWALSKI view with an estimated ejection fraction of 55% to  60%. There was trivial post PVC mitral regurgitation. The right radial arterial sheath was removed at the end of the procedure  and a TR band was applied with adequate hemostasis and with preservation  of pulse. The patient tolerated the procedure well and left the cardiac  catheterization laboratory in stable condition. CONCLUSIONS:  1. Severe calcific three-vessel coronary artery disease as described  above. 2.  Normal left ventricular size with no gross regional wall motion  abnormality noted on the 35-degree KOWALSKI view with preserved global left  ventricular systolic function with an estimated ejection fraction of 55%  to 60%. 3.  Severe systemic hypertension. 4.  Mildly elevated left ventricular end-diastolic pressure.         Annia Goldberg MD    D: 10/09/2020 13:23:37       T: 10/09/2020 13:36:56     [email or web address]  Job#: [id number]     Doc#: [id number]    CC:

## 2020-10-09 NOTE — OP NOTE
Operative Note      Patient: Jacquie Ma  YOB: 1935  MRN: [id number]    Date of Procedure: 10/9/2020    Indication:  1. NSTEMI  2. AUC score: 8  3. AUC indication: 3    Procedure: Left Heart Catheterization, coronary angiography, left ventriculography    Anesthesia: Versed, Fentanyl  Time sedation was administered: 12:44. I was present in the room when sedation was administered. Procedure end time: 13:03  Time spent with face to face monitoring of moderate sedation: 28 min    LHC performed via right radial approach using a 6 F sheath. 2.5mg of diluted Verapamil and 200mcg of nitroglycerine administered through the sheath. 5000 U heparin administered IV. Findings:  Left main: 0%  stenosis  LAD: 70 %  stenosis  Circumflex: 99 %   stenosis  RCA: Dominant. 90-95 %  stenosis  LV angio: 55-60%  ejection fraction    Hemodynamics:  LV: 223 mmHg. EDP: 13 No gradient across AV. Ao: 194/79 ( 124 ). Sheath removed and TR band applied. There was good hemostasis achieved and the distal pulses were intact.      Complication: None   Estimated blood loss: 20 cc  Contrast use: 75 cc    Post op diagnosis:  Severe calcific 3 vessel CAD  Systemic HTN  Normal EF    PLAN:  CT surgery consult          Electronically signed by Oracio Bell MD on 10/9/2020 at 1:12 PM

## 2020-10-09 NOTE — CARE COORDINATION
SW spoke with Pt about Transition plan of Care. Pt lives alone with 1 small step to enter through the garage. Pt uses a walker. PCP: Dr. Piper Draper. [facility]. Family to provide transport at discharge. Pt declined [address] [facility] at this time but will consider if needed. Discharge plan is to return home with no needs at this time. SW/FOUZIA to follow for discharge needs.     Verna Mcneill, L.S.W.  [phone]

## 2020-10-09 NOTE — ED PROVIDER NOTES
HPI:  10/9/20,   Time: 1:08 AM EDT      Tiffanie Duggan is a 80 y.o. female presenting to the ED for \"throat pain\" and \"chest pain\" , beginning several days ago. The complaint has been persistent, moderate to severe in severity, and worsened by nothing. She states she thinks the pain is due to acid reflux because she has this pain in the upper part of her throat. When asked the patient to describe her chest pain she states it feels like \" heaviness across (her) chest.\"  She denies any nausea/vomiting associated, no fever/chills, no cough no colored sputum production, no hemoptysis. Patient denies any pain radiating to the back or any sharp stabbing back pain. She denies any pain radiating to the left arm. No syncope no near syncopal episodes associated. She rates the pain at 10/10 severity. Review of Systems:   Pertinent positives and negatives are stated within HPI, all other systems reviewed and are negative.    --------------------------------------------- PAST HISTORY ---------------------------------------------  Past Medical History:  has a past medical history of Diabetes ([facility] [address] Hypertension, Left renal artery stenosis ([facility] [address] and Renal artery stenosis ([facility] [address] Past Surgical History:  has a past surgical history that includes Hysterectomy; Cholecystectomy; Eye surgery (Right); Hand surgery (Bilateral); knee surgery; blepharoplasty (Bilateral); and Colonoscopy. Social History:  reports that she quit smoking about 6 months ago. Her smoking use included cigarettes. She smoked 0.50 packs per day. She has never used smokeless tobacco. She reports that she does not drink alcohol or use drugs. Family History: family history is not on file. The patients home medications have been reviewed. Allergies: Patient has no known allergies.       ---------------------------------------------------PHYSICAL EXAM--------------------------------------    Constitutional/General: Alert and oriented x3, well appearing, non toxic in mild distress  Head: Normocephalic and atraumatic  Eyes: PERRL, EOMI, conjunctive normal, sclera non icteric  Mouth: Oropharynx clear, handling secretions, no trismus, no asymmetry of the posterior oropharynx or uvular edema  Neck: Supple, full ROM, non tender to palpation in the midline, no stridor, no crepitus, no meningeal signs  Respiratory: Lungs clear to auscultation bilaterally, no wheezes, rales, or rhonchi. Not in respiratory distress  Cardiovascular: Tachycardic irregularly irregular rhythm. No murmurs, gallops, or rubs. 2+ distal pulses; PMI nondisplaced  Chest: No chest wall tenderness  GI:  Abdomen Soft, Non tender, Non distended. +BS. No organomegaly, no palpable masses,  No rebound, guarding, or rigidity. Musculoskeletal: Moves all extremities x 4. Warm and well perfused, no clubbing, cyanosis, 1+ edema. Capillary refill <3 seconds; no calf tenderness no cords. No clinical signs of DVT  Integument: skin warm and dry. No rashes. Lymphatic: no lymphadenopathy noted  Neurologic: GCS 15, cranial nerves grossly intact no focal deficits no meningeal signs no motor weakness evident  Psychiatric: Normal Affect    -------------------------------------------------- RESULTS -------------------------------------------------  I have personally reviewed all laboratory and imaging results for this patient. Results are listed below.      LABS:  Results for orders placed or performed during the hospital encounter of 10/08/20   Troponin   Result Value Ref Range    Troponin 0.15 (H) 0.00 - 0.03 ng/mL   CBC Auto Differential   Result Value Ref Range    WBC 11.4 4.5 - 11.5 E9/L    RBC 5.28 3.50 - 5.50 E12/L    Hemoglobin 16.1 (H) 11.5 - 15.5 g/dL    Hematocrit 48.1 (H) 34.0 - 48.0 %    MCV 91.1 80.0 - 99.9 fL    MCH 30.5 26.0 - 35.0 pg    MCHC 33.5 32.0 - 34.5 %    RDW 12.7 11.5 - 15.0 fL    Platelets 678 811 - 235 E9/L    MPV 10.3 7.0 - 12.0 fL    Neutrophils % 81.4 (H) 43.0 - 80.0 % Immature Granulocytes % 0.5 0.0 - 5.0 %    Lymphocytes % 12.9 (L) 20.0 - 42.0 %    Monocytes % 4.6 2.0 - 12.0 %    Eosinophils % 0.2 0.0 - 6.0 %    Basophils % 0.4 0.0 - 2.0 %    Neutrophils Absolute 9.30 (H) 1.80 - 7.30 E9/L    Immature Granulocytes # 0.06 E9/L    Lymphocytes Absolute 1.48 (L) 1.50 - 4.00 E9/L    Monocytes Absolute 0.53 0.10 - 0.95 E9/L    Eosinophils Absolute 0.02 (L) 0.05 - 0.50 E9/L    Basophils Absolute 0.05 0.00 - 0.20 E9/L   Comprehensive Metabolic Panel   Result Value Ref Range    Sodium 139 132 - 146 mmol/L    Potassium 3.6 3.5 - 5.0 mmol/L    Chloride 98 98 - 107 mmol/L    CO2 25 22 - 29 mmol/L    Anion Gap 16 7 - 16 mmol/L    Glucose 177 (H) 74 - 99 mg/dL    BUN 29 (H) 8 - 23 mg/dL    CREATININE 1.0 0.5 - 1.0 mg/dL    GFR Non-African American 53 >=60 mL/min/1.73    GFR African American >60     Calcium 11.0 (H) 8.6 - 10.2 mg/dL    Total Protein 7.5 6.4 - 8.3 g/dL    Alb 4.0 3.5 - 5.2 g/dL    Total Bilirubin 0.4 0.0 - 1.2 mg/dL    Alkaline Phosphatase 132 (H) 35 - 104 U/L    ALT 22 0 - 32 U/L    AST 21 0 - 31 U/L   Lipase   Result Value Ref Range    Lipase 24 13 - 60 U/L   Lactic Acid, Plasma   Result Value Ref Range    Lactic Acid 1.8 0.5 - 2.2 mmol/L   Brain Natriuretic Peptide   Result Value Ref Range    Pro-BNP 2,310 (H) 0 - 450 pg/mL   EKG 12 Lead   Result Value Ref Range    Ventricular Rate 125 BPM    Atrial Rate 119 BPM    QRS Duration 134 ms    Q-T Interval 294 ms    QTc Calculation (Bazett) 424 ms    R Axis -69 degrees    T Axis -26 degrees   EKG 12 Lead   Result Value Ref Range    Ventricular Rate 93 BPM    Atrial Rate 93 BPM    P-R Interval 208 ms    QRS Duration 136 ms    Q-T Interval 392 ms    QTc Calculation (Bazett) 487 ms    P Axis 46 degrees    R Axis -48 degrees    T Axis 9 degrees       RADIOLOGY:  Interpreted by Radiologist.  XR CHEST PORTABLE   Final Result   No acute process.                ------------------------- NURSING NOTES AND VITALS REVIEWED ---------------------------  The nursing notes within the ED encounter and vital signs as below have been reviewed by myself. BP (!) 168/114   Pulse 93   Temp 95.7 °F (35.4 °C) (Temporal)   Resp 16   Ht 5' 3\" (1.6 m)   Wt 160 lb (72.6 kg)   SpO2 96%   BMI 28.34 kg/m² . Oxygen Saturation Interpretation: Normal  The patients available past medical records and past encounters were reviewed. ------------------------------ ED COURSE/MEDICAL DECISION MAKING----------------------  Medications   dilTIAZem 100 mg in dextrose 5 % 100 mL infusion (ADD-[place]) (5 mg/hr Intravenous New Bag 10/9/20 0101)   furosemide (LASIX) injection 20 mg (has no administration in time range)   0.9 % sodium chloride bolus (0 mLs Intravenous Stopped 10/9/20 0106)   aluminum & magnesium hydroxide-simethicone (MAALOX) 30 mL, lidocaine viscous hcl (XYLOCAINE) 5 mL (GI COCKTAIL) ( Oral Given 10/8/20 2353)   famotidine (PEPCID) injection 20 mg (20 mg Intravenous Given 10/8/20 2355)   dilTIAZem injection 10 mg (10 mg Intravenous Given 10/9/20 0101)   aspirin chewable tablet 324 mg (324 mg Oral Given 10/9/20 0121)   enoxaparin (LOVENOX) injection 70 mg (70 mg Subcutaneous Given 10/9/20 0125)   nitroglycerin (NITRO-BID) 2 % ointment 0.3 inch (0.3 inches Topical Given 10/9/20 0125)       ED COURSE:     Medical Decision Making:   Differential Diagnoses:  MI, PE, GERD w/ Esophagitis, COVID-19, thyroid dysfunction, metabolic/electrolyte abnormalities, to name a few. Pt's HEART Score = 7 points, HIGH risk score    EKG#1:  This EKG is signed and interpreted by the EP. Time: 00:17  Rate: 125  Rhythm: Atrial fibrillation  Interpretation: atrial fibrillation (new onset) w/ RBBB, LAFB (Bifascicular block)  Comparison: changes compared to previous EKG 9/26/2020 and EKG 6/6/2020    EKG#2:  This EKG is signed and interpreted by the EP.     Time: 02:25  Rate: 93  Rhythm: Sinus  Interpretation:  RBBB, LAFB (Bifascicular block); + LVH  Comparison: rubs. clear to auscultation, no wheezes or rales and unlabored breathing. Capillary Refill: normal.  Radial Pulse:  present 2+. Skin:  Dry. Consultations:  Spoke w/ the patient's PCP, Dr. Dayami Chin, and accepted the patient admission to telemetry at [facility]. Patient having new onset atrial fibrillation and a high [id number]  Atrial Fibrillation Risk Score, she agreed with the plan for anticoagulation and also agreed with Lovenox administration for achieving this. Critical Care:   Please note that the withdrawal or failure to initiate urgent interventions for this patient would likely result in a life threatening deterioration or permanent disability. Accordingly this patient received 30 minutes of critical care time, excluding separately billable procedures. Counseling: The emergency provider has spoken with the patient and family member patient and daughter and discussed todays results, in addition to providing specific details for the plan of care and counseling regarding the diagnosis and prognosis. Questions are answered at this time and they are agreeable with the plan.     --------------------------------- IMPRESSION AND DISPOSITION ---------------------------------    IMPRESSION  1. NSTEMI (non-ST elevated myocardial infarction) ([address]    2. Atrial fibrillation with rapid ventricular response ([address]    3. Acute on chronic congestive heart failure, unspecified heart failure type ([address]        DISPOSITION  Disposition: Admit to telemetry  Patient condition is stable    NOTE: This report was transcribed using voice recognition software.  Every effort was made to ensure accuracy; however, inadvertent computerized transcription errors may be present     Duane Landis MD  10/09/20 [address], [place]  10/09/20 9240

## 2020-10-09 NOTE — PLAN OF CARE
Problem: Daily Care:  Goal: Daily care needs are met  Description: Daily care needs are met  Outcome: Met This Shift

## 2020-10-09 NOTE — CONSULTS
[facility]    Name: Ines Shown    Age: 80 y.o. Date of Admission: 10/8/2020 11:13 PM    Date of Service: 10/9/2020    Reason for Consultation: atrial fib , elevated troponin    Referring Physician: Dr Mahad Colunga  Primary Care Physician: Bibiana Singer DO    History of Present Illness: The patient is a 80y.o. year old female presenting to [facility] ED yesterday for chest pains, noted to be in afib rvr. She states the last couple months has been getting chest pains across upper chest like something laying on her chest, radiating to both shoulders. Sometime provoked by eating and many time has bothered her at night. She is sedentary due to spinal stenosis. She was evaluated 3 weeks ago in ED and given a cocktail and protonix which helped some, but symptoms have come back this past week. Was in hospital in June for some hypoxia- had a CTA no PE, but had coronary calcifications, She intermittently smokes, has DM, several siblings with CAD. She had an normall echo 3/2020 at [facility]- she does not remember why. Denies CVA, previous arrhythmia, or MI. Past Medical History:   Past Medical History:   Diagnosis Date    Diabetes ([facility] [address]     Hypertension     Left renal artery stenosis ([facility] [address] 9/6/2018    Renal artery stenosis (HCC)        Review of Systems:   Constitutional: No fever, chills, sweats  Cardiac: As per HPI  Pulmonary: No cough, wheeze, hemoptysis  HEENT: No visual disturbances, difficult swallowing  GI: No nausea, vomiting, diarrhea, abdominal pain, rectal bleeding  : No dysuria or hematuria  Endocrine: No excessive thirst, heat or cold intolerance. Musculoskeletal: No joint pain or muscle aches. No claudication  Skin: No skin breakdown or rashes  Neuro: No headache, confusion, or seizures  Psych: No depression, anxiety    Family History:  History reviewed. No pertinent family history.     Social History:  Social History     Socioeconomic History  Marital status:      Spouse name: Not on file    Number of children: Not on file    Years of education: Not on file    Highest education level: Not on file   Occupational History    Not on file   Social Needs    Financial resource strain: Not on file    Food insecurity     Worry: Not on file     Inability: Not on file    Transportation needs     Medical: Not on file     Non-medical: Not on file   Tobacco Use    Smoking status: Former Smoker     Packs/day: 0.50     Types: Cigarettes     Last attempt to quit: 3/31/2020     Years since quittin.5    Smokeless tobacco: Never Used   Substance and Sexual Activity    Alcohol use: No     Comment: rare    Drug use: No    Sexual activity: Not Currently   Lifestyle    Physical activity     Days per week: Not on file     Minutes per session: Not on file    Stress: Not on file   Relationships    Social connections     Talks on phone: Not on file     Gets together: Not on file     Attends Sikh service: Not on file     Active member of club or organization: Not on file     Attends meetings of clubs or organizations: Not on file     Relationship status: Not on file    Intimate partner violence     Fear of current or ex partner: Not on file     Emotionally abused: Not on file     Physically abused: Not on file     Forced sexual activity: Not on file   Other Topics Concern    Not on file   Social History Narrative    Not on file       Allergies:  No Known Allergies    Home Medications:  Prior to Admission medications    Medication Sig Start Date End Date Taking?  Authorizing Provider   ibuprofen (ADVIL;MOTRIN) 400 MG tablet Take 400 mg by mouth every 6 hours as needed for Pain   Yes Historical Provider, MD   sucralfate (CARAFATE) 1 GM tablet Take 1 g by mouth 4 times daily   Yes Historical Provider, MD   ezetimibe (ZETIA) 10 MG tablet Take 10 mg by mouth daily   Yes Historical Provider, MD   fluticasone (FLONASE) 50 MCG/ACT nasal spray 2 sprays by (ZETIA) tablet 10 mg  10 mg Oral Nightly Khadijah Singer, DO        insulin lispro (HUMALOG) injection vial 0-6 Units  0-6 Units Subcutaneous TID WC Khadijah Singer, DO        insulin lispro (HUMALOG) injection vial 0-3 Units  0-3 Units Subcutaneous Nightly Khadijah E Enmanuel, DO        glucose (GLUTOSE) 40 % oral gel 15 g  15 g Oral PRN Khadijah SMALL Enmanuel, DO        dextrose 50 % IV solution  12.5 g Intravenous PRN Khadijah SMALL Enmanuel, DO        glucagon (rDNA) injection 1 mg  1 mg Intramuscular PRN Khadijah SMALL Enmanuel, DO        dextrose 5 % solution  100 mL/hr Intravenous PRN Khadijah Singer, DO        pantoprazole (PROTONIX) tablet 40 mg  40 mg Oral QAM AC Khadijah Singer, DO   40 mg at 10/09/20 0534    [START ON 10/10/2020] influenza quadrivalent split vaccine (FLUZONE;FLUARIX;FLULAVAL;AFLURIA) injection 0.5 mL  0.5 mL Intramuscular Prior to discharge Khadijah Singer, DO        calcium carbonate (TUMS) chewable tablet 1,000 mg  1,000 mg Oral TID PRN Khadijah Singer, DO   1,000 mg at 10/09/20 0534    regadenoson (LEXISCAN) injection 0.4 mg  0.4 mg Intravenous ONCE PRN Nisreen Hart MD          diltiazem (CARDIZEM) 100 mg in dextrose 5% 100 mL (ADD-[place]) 5 mg/hr (10/09/20 0530)    dextrose         Physical Exam:  /68   Pulse 86   Temp 97.8 °F (36.6 °C) (Temporal)   Resp 16   Ht 5' 3\" (1.6 m)   Wt 157 lb 6.4 oz (71.4 kg)   SpO2 97%   BMI 27.88 kg/m²   Weight change: Wt Readings from Last 3 Encounters:   10/09/20 157 lb 6.4 oz (71.4 kg)   09/26/20 160 lb (72.6 kg)   08/18/20 160 lb (72.6 kg)     General: Awake, alert, oriented x3, no acute distress  HEENT: Normocephalic and atraumatic, extraocular movements intact, pupils equal and round, moist mucus membranes, sclera anicteric  Neck: No JVD, no carotid bruits, no thyromegaly, no adenopathy  Cardiac: Regular rate and rhythm, normal S1 and physiologically split S2, no S3, no S4. Apical impulse is nondisplaced.   No murmurs, no pericardial rubs, no clicks. Carotid upstrokes brisk. Respiratory: Clear bilaterally; no wheezes, no rales, no rhonchi. Unlabored respirations  Abdomen: Soft, nontender, nondistended, bowel sounds+, no hepatomegaly, no masses, no abdominal bruits  Extremities: No edema, no cyanosis, no clubbing. Distal pulses intact  Skin: Intact, warm and dry, no rashes, no breakdown  Musculoskeletal: normal tone and strength in the upper and lower extremities bilaterally  Neuro: No focal deficits. Moves all extremities appropriately to command. Normal sensation in the upper and lower extremities bilaterally  Psychiatric: Cooperative, and normal affect    Intake/Output:    Intake/Output Summary (Last 24 hours) at 10/9/2020 0817  Last data filed at 10/9/2020 0530  Gross per 24 hour   Intake 123 ml   Output 0 ml   Net 123 ml     No intake/output data recorded. Laboratory Tests:  Last 3 CBC:  Recent Labs     10/08/20  2350 10/09/20  0521   WBC 11.4 8.4   RBC 5.28 4.67   HGB 16.1* 14.0   HCT 48.1* 42.0   MCV 91.1 89.9   MCH 30.5 30.0   MCHC 33.5 33.3   RDW 12.7 12.7    242   MPV 10.3 10.4       Last 3 CMP:    Recent Labs     10/08/20  2350 10/09/20  0521    142   K 3.6 3.5   CL 98 99   CO2 25 28   BUN 29* 26*   CREATININE 1.0 1.0   GLUCOSE 177* 144*   CALCIUM 11.0* 10.1   PROT 7.5 6.1*   LABALBU 4.0 3.6   BILITOT 0.4 0.3   ALKPHOS 132* 114*   AST 21 24   ALT 22 19       Last 3 Mag/Phos:  Recent Labs     10/09/20  0521   MG 1.2*       Last 3 CK, CKMB, Troponin  Recent Labs     10/08/20  2350 10/09/20  0521   TROPONINI 0.15* 0.29*       Last 3 Pro-BNP:  Recent Labs     10/09/20  0118   PROBNP 2,310*     Lab Results   Component Value Date    PROBNP 2,310 (H) 10/09/2020       Last 3 Glucose:     Recent Labs     10/08/20  2350 10/09/20  0521   GLUCOSE 177* 144*       Last 3 Coags:  No results for input(s): PROTIME, INR, PTT in the last 72 hours.   No results found for: PROTIME, INR, PTT    Last 3 Lipid Panel:  No results for input(s): LDLCALC, HDL, TRIG in the last 72 hours. Invalid input(s): CHLPL  No results found for: LDLCALC  No results found for: HDL  No results found for: TRIG  No components found for: CHLPL    TSH:  Recent Labs     10/09/20  0521   TSH 1.730     Lab Results   Component Value Date    TSH 1.730 10/09/2020       ABGs:  No results for input(s): PH, PO2, PCO2, HCO3, BE, O2SAT in the last 72 hours. Lactic Acid:  Recent Labs     10/08/20  2350   LACTA 1.8         Radiology:  RAD Results:  XR CHEST PORTABLE   Final Result   No acute process. NM Cardiac Stress Test Nuclear Imaging    (Results Pending)         EKG and Telemetry:  12-lead EKG personally reviewed and shows #1 atrial fibrillation 123, LAD, RBBB, lateral ST depression  #2 NSR LAD RBBB lateral ST depression    Telemetry personally reviewed and shows sinus rhythm        ASSESSMENT / PLAN:    1. NSTEMI- sounds like she has been having some angina type symptoms for the past month. Multiple risk factors age, DM, smoker family history, This may be provoking afib or the afib may be provoking angina. Coronary atherosclerosis noted on CT  From this summer. Would recommend cath and intervene if necessary. SCAI indication 3, score 8. Check lipid panel, start statin  2 PAF unclear provoking angina or provoked by angina. Back in sinus at present. [id number] score 6 so would recommend [address] once CAD issues addressed. Just had echo in March- LA reported normal size and no significant valvular disease. Would switch from cardizem IV to beta blocker  3 RBBB chronic  4 Smoker- needs to stop  5 DM- aim for A1C < 7, hold glucophage for cath  6 GERD- may or may not be contributing to her symptoms- can cont PPI. Thank you for consultation.     Rosanne Giles MD, [facility]  The [address] at [facility]    Electronically signed by Rosanne Giles MD on 10/9/2020 at 8:17 AM

## 2020-10-10 ENCOUNTER — APPOINTMENT (OUTPATIENT)
Dept: ULTRASOUND IMAGING | Age: 85
DRG: 281 | End: 2020-10-10
Payer: MEDICARE

## 2020-10-10 ENCOUNTER — APPOINTMENT (OUTPATIENT)
Dept: CT IMAGING | Age: 85
DRG: 281 | End: 2020-10-10
Payer: MEDICARE

## 2020-10-10 PROBLEM — I10 ESSENTIAL HYPERTENSION, BENIGN: Status: ACTIVE | Noted: 2020-10-10

## 2020-10-10 LAB
ANION GAP SERPL CALCULATED.3IONS-SCNC: 14 MMOL/L (ref 7–16)
BUN BLDV-MCNC: 29 MG/DL (ref 8–23)
CALCIUM SERPL-MCNC: 9.5 MG/DL (ref 8.6–10.2)
CHLORIDE BLD-SCNC: 100 MMOL/L (ref 98–107)
CO2: 27 MMOL/L (ref 22–29)
CREAT SERPL-MCNC: 1.2 MG/DL (ref 0.5–1)
GFR AFRICAN AMERICAN: 52
GFR NON-AFRICAN AMERICAN: 43 ML/MIN/1.73
GLUCOSE BLD-MCNC: 99 MG/DL (ref 74–99)
METER GLUCOSE: 109 MG/DL (ref 74–99)
METER GLUCOSE: 146 MG/DL (ref 74–99)
METER GLUCOSE: 152 MG/DL (ref 74–99)
METER GLUCOSE: 165 MG/DL (ref 74–99)
POTASSIUM SERPL-SCNC: 3.3 MMOL/L (ref 3.5–5)
SODIUM BLD-SCNC: 141 MMOL/L (ref 132–146)

## 2020-10-10 PROCEDURE — 93880 EXTRACRANIAL BILAT STUDY: CPT | Performed by: RADIOLOGY

## 2020-10-10 PROCEDURE — 82962 GLUCOSE BLOOD TEST: CPT

## 2020-10-10 PROCEDURE — 99222 1ST HOSP IP/OBS MODERATE 55: CPT | Performed by: THORACIC SURGERY (CARDIOTHORACIC VASCULAR SURGERY)

## 2020-10-10 PROCEDURE — 2140000000 HC CCU INTERMEDIATE R&B

## 2020-10-10 PROCEDURE — 93970 EXTREMITY STUDY: CPT

## 2020-10-10 PROCEDURE — 71250 CT THORAX DX C-: CPT

## 2020-10-10 PROCEDURE — 6370000000 HC RX 637 (ALT 250 FOR IP): Performed by: INTERNAL MEDICINE

## 2020-10-10 PROCEDURE — 93970 EXTREMITY STUDY: CPT | Performed by: RADIOLOGY

## 2020-10-10 PROCEDURE — 80048 BASIC METABOLIC PNL TOTAL CA: CPT

## 2020-10-10 PROCEDURE — 36415 COLL VENOUS BLD VENIPUNCTURE: CPT

## 2020-10-10 PROCEDURE — 93880 EXTRACRANIAL BILAT STUDY: CPT

## 2020-10-10 PROCEDURE — 2580000003 HC RX 258: Performed by: INTERNAL MEDICINE

## 2020-10-10 RX ORDER — SODIUM CHLORIDE 9 MG/ML
INJECTION, SOLUTION INTRAVENOUS CONTINUOUS
Status: DISCONTINUED | OUTPATIENT
Start: 2020-10-10 | End: 2020-10-11

## 2020-10-10 RX ADMIN — ACETAMINOPHEN 650 MG: 325 TABLET, FILM COATED ORAL at 21:51

## 2020-10-10 RX ADMIN — SODIUM CHLORIDE: 9 INJECTION, SOLUTION INTRAVENOUS at 10:53

## 2020-10-10 RX ADMIN — INSULIN LISPRO 1 UNITS: 100 INJECTION, SOLUTION INTRAVENOUS; SUBCUTANEOUS at 15:53

## 2020-10-10 RX ADMIN — SODIUM CHLORIDE, PRESERVATIVE FREE 10 ML: 5 INJECTION INTRAVENOUS at 08:52

## 2020-10-10 RX ADMIN — METOPROLOL SUCCINATE 25 MG: 25 TABLET, EXTENDED RELEASE ORAL at 21:51

## 2020-10-10 RX ADMIN — LOSARTAN POTASSIUM 100 MG: 50 TABLET, FILM COATED ORAL at 08:52

## 2020-10-10 RX ADMIN — EZETIMIBE 10 MG: 10 TABLET ORAL at 21:51

## 2020-10-10 RX ADMIN — ASPIRIN 81 MG CHEWABLE TABLET 81 MG: 81 TABLET CHEWABLE at 08:56

## 2020-10-10 RX ADMIN — PANTOPRAZOLE SODIUM 40 MG: 40 TABLET, DELAYED RELEASE ORAL at 06:30

## 2020-10-10 RX ADMIN — INSULIN LISPRO 1 UNITS: 100 INJECTION, SOLUTION INTRAVENOUS; SUBCUTANEOUS at 21:55

## 2020-10-10 RX ADMIN — ISOSORBIDE MONONITRATE 60 MG: 60 TABLET ORAL at 08:53

## 2020-10-10 RX ADMIN — SODIUM CHLORIDE, PRESERVATIVE FREE 10 ML: 5 INJECTION INTRAVENOUS at 21:52

## 2020-10-10 RX ADMIN — ACETAMINOPHEN 650 MG: 325 TABLET, FILM COATED ORAL at 14:36

## 2020-10-10 RX ADMIN — METOPROLOL SUCCINATE 25 MG: 25 TABLET, EXTENDED RELEASE ORAL at 08:54

## 2020-10-10 ASSESSMENT — ENCOUNTER SYMPTOMS
NAUSEA: 0
SHORTNESS OF BREATH: 0
DIARRHEA: 0
VOMITING: 0
COUGH: 0

## 2020-10-10 ASSESSMENT — PAIN SCALES - GENERAL
PAINLEVEL_OUTOF10: 0
PAINLEVEL_OUTOF10: 2
PAINLEVEL_OUTOF10: 8
PAINLEVEL_OUTOF10: 2
PAINLEVEL_OUTOF10: 0
PAINLEVEL_OUTOF10: 3

## 2020-10-10 ASSESSMENT — PAIN DESCRIPTION - LOCATION
LOCATION: BACK
LOCATION: THROAT

## 2020-10-10 ASSESSMENT — PAIN DESCRIPTION - FREQUENCY: FREQUENCY: INTERMITTENT

## 2020-10-10 ASSESSMENT — PAIN DESCRIPTION - PROGRESSION: CLINICAL_PROGRESSION: NOT CHANGED

## 2020-10-10 ASSESSMENT — PAIN DESCRIPTION - PAIN TYPE
TYPE: ACUTE PAIN
TYPE: CHRONIC PAIN

## 2020-10-10 ASSESSMENT — PAIN DESCRIPTION - DESCRIPTORS: DESCRIPTORS: SORE;ACHING;DISCOMFORT

## 2020-10-10 ASSESSMENT — PAIN DESCRIPTION - ORIENTATION: ORIENTATION: MID

## 2020-10-10 ASSESSMENT — PAIN DESCRIPTION - ONSET: ONSET: ON-GOING

## 2020-10-10 ASSESSMENT — PAIN - FUNCTIONAL ASSESSMENT: PAIN_FUNCTIONAL_ASSESSMENT: ACTIVITIES ARE NOT PREVENTED

## 2020-10-10 NOTE — PROGRESS NOTES
[facility] Cardiology    INPATIENT CARDIOLOGY FOLLOW-UP    Name: Jerry Hernandez    Age: 80 y.o. Date of Admission: 10/8/2020 11:13 PM    Date of Service: 10/10/2020    Chief Complaint: Follow-up for non-STEMI, PAF,    Interim History:  No new overnight cardiac complaints. Currently with no complaints of CP, SOB, palpitations, dizziness, or lightheadedness. Heart catheterization films reviewed.   Severe calcific multivessel CAD with normal LVEF      Telemetry  reviewed and showed sinus rhythm        Review of Systems:   Cardiac: As per HPI  General: No fever, chills  Pulmonary: No cough, wheeze, or shortness of breath  GI: No nausea, vomiting,or abdominal pain  Neuro: No headache or confusion      Allergies:  No Known Allergies    Current Medications:  Current Facility-Administered Medications   Medication Dose Route Frequency Provider Last Rate Last Dose    dilTIAZem 100 mg in dextrose 5 % 100 mL infusion (ADD-[place])  5 mg/hr Intravenous Continuous Yuri Perkins MD   Stopped at 10/09/20 1613    sodium chloride flush 0.9 % injection 10 mL  10 mL Intravenous 2 times per day Yuri Perkins MD   10 mL at 10/09/20 2103    sodium chloride flush 0.9 % injection 10 mL  10 mL Intravenous PRN Yuri Perkins MD        potassium chloride (KLOR-CON M) extended release tablet 40 mEq  40 mEq Oral PRN Yuri Perkins MD        Or    potassium bicarb-citric acid (EFFER-K) effervescent tablet 40 mEq  40 mEq Oral PRN Yuri Perkins MD        Or    potassium chloride 10 mEq/100 mL IVPB (Peripheral Line)  10 mEq Intravenous PRN Yuri Perkins MD        acetaminophen (TYLENOL) tablet 650 mg  650 mg Oral Q6H PRN Yuri Perkins MD   650 mg at 10/09/20 2151    Or    acetaminophen (TYLENOL) suppository 650 mg  650 mg Rectal Q6H PRN Yuri Perkins MD        [facility]) tablet 8.6 mg  1 tablet Oral Daily PRN Yuri Perkins MD        promethazine (PHENERGAN) tablet 12.5 mg  12.5 mg Oral Q6H PRN Yuri Perkisn MD        Or  ondansetron (ZOFRAN) injection 4 mg  4 mg Intravenous Q6H PRN Kayleen Martinez MD        ezetimibe (ZETIA) tablet 10 mg  10 mg Oral Nightly Kayleen Martinez MD   10 mg at 10/09/20 2102    insulin lispro (HUMALOG) injection vial 0-6 Units  0-6 Units Subcutaneous TID WC Kayleen Martinez MD   1 Units at 10/09/20 1730    insulin lispro (HUMALOG) injection vial 0-3 Units  0-3 Units Subcutaneous Nightly Kayleen Martinez MD   1 Units at 10/09/20 2130    glucose (GLUTOSE) 40 % oral gel 15 g  15 g Oral PRN Kayleen Martinez MD        dextrose 50 % IV solution  12.5 g Intravenous PRN Kayleen Martinez MD        glucagon (rDNA) injection 1 mg  1 mg Intramuscular PRN Kayleen Martinez MD        dextrose 5 % solution  100 mL/hr Intravenous PRN Kayleen Martinez MD        pantoprazole (PROTONIX) tablet 40 mg  40 mg Oral QAM AC Eunice Cope MD   40 mg at 10/10/20 0630    influenza quadrivalent split vaccine (FLUZONE;FLUARIX;FLULAVAL;AFLURIA) injection 0.5 mL  0.5 mL Intramuscular Prior to discharge Kayleen Martinez MD        calcium carbonate (TUMS) chewable tablet 1,000 mg  1,000 mg Oral TID PRN Kayleen Martinez MD   1,000 mg at 10/09/20 0534    aspirin chewable tablet 81 mg  81 mg Oral Daily Eunice Cope MD        0.9 % sodium chloride infusion   Intravenous Continuous Kayleen Martinez MD 20 mL/hr at 10/09/20 1343      0.9 % sodium chloride infusion   Intravenous Continuous Kayleen Martinez MD 20 mL/hr at 10/09/20 1343      losartan (COZAAR) tablet 100 mg  100 mg Oral Daily Olga Alston MD   100 mg at 10/09/20 1401    isosorbide mononitrate (IMDUR) extended release tablet 60 mg  60 mg Oral Daily Olga Alston MD   60 mg at 10/09/20 1616    metoprolol succinate (TOPROL XL) extended release tablet 25 mg  25 mg Oral BID Olga Alston MD   25 mg at 10/09/20 2103      diltiazem (CARDIZEM) 100 mg in dextrose 5% 100 mL (ADD-[place]) Stopped (10/09/20 1613)    dextrose      sodium chloride 20 mL/hr at 10/09/20 1343    sodium chloride 20 mL/hr at 10/09/20 1343       Physical Exam:  /62   Pulse 78   Temp 97.1 °F (36.2 °C) (Temporal)   Resp 14   Ht 5' 3\" (1.6 m)   Wt 157 lb 6.4 oz (71.4 kg)   SpO2 92%   BMI 27.88 kg/m²   Weight change: Wt Readings from Last 3 Encounters:   10/09/20 157 lb 6.4 oz (71.4 kg)   09/26/20 160 lb (72.6 kg)   08/18/20 160 lb (72.6 kg)         Intake/Output:    Intake/Output Summary (Last 24 hours) at 10/10/2020 0714  Last data filed at 10/10/2020 0400  Gross per 24 hour   Intake 480 ml   Output 650 ml   Net -170 ml     No intake/output data recorded. General: Awake, alert, oriented x3, no acute distress    Neck: No JVD or carotid bruits,     Cardiac: Regular rate and rhythm, normal S1 and  S2, no murmurs, no S4 or S3, no  rubs. Normal carotid upstroke and no bruits. Resp: Unlabored respirations at rest.  No wheezes, rales or rhonchi. Abdomen: soft, nontender, nondistended, BS+; no masses, bruits, or hepatomegaly    Extremities: no cyanosis, clubbing, or edema. Normal pulses in the upper/lower extremities bilaterally. Skin: Warm and dry, no bruises, petechiae or rashes. Neuro: moves all extremities appropriately to command, and normal sensation to light touch in the upper and lower extremities bilaterally.       Laboratory Tests:  Lab Results   Component Value Date    CREATININE 1.0 10/09/2020    BUN 26 (H) 10/09/2020     10/09/2020    K 3.5 10/09/2020    CL 99 10/09/2020    CO2 28 10/09/2020     Recent Labs     10/08/20  2350 10/09/20  0521 10/09/20  1048   TROPONINI 0.15* 0.29* 0.44*     Lab Results   Component Value Date    PROBNP 2,310 (H) 10/09/2020     Lab Results   Component Value Date    WBC 8.4 10/09/2020    RBC 4.67 10/09/2020    HGB 14.0 10/09/2020    HCT 42.0 10/09/2020    MCV 89.9 10/09/2020    MCH 30.0 10/09/2020    MCHC 33.3 10/09/2020    RDW 12.7 10/09/2020     10/09/2020    MPV 10.4 10/09/2020     Recent Labs     10/08/20  4110 10/09/20  0521 ALKPHOS 132* 114*   ALT 22 19   AST 21 24   PROT 7.5 6.1*   BILITOT 0.4 0.3   LABALBU 4.0 3.6     Lab Results   Component Value Date    MG 1.2 10/09/2020     No results found for: PROTIME, INR  Lab Results   Component Value Date    TSH 1.730 10/09/2020     No components found for: CHLPL  Lab Results   Component Value Date    TRIG 191 (H) 10/09/2020     Lab Results   Component Value Date    HDL 54 10/09/2020     Lab Results   Component Value Date    LDLCALC 84 10/09/2020       Radiology:  XR CHEST PORTABLE   Final Result   No acute process. Heart catheterization CONCLUSIONS:  1. Severe calcific three-vessel coronary artery disease as described  above. 2.  Normal left ventricular size with no gross regional wall motion  abnormality noted on the 35-degree KOWALSKI view with preserved global left  ventricular systolic function with an estimated ejection fraction of 55%  to 60%. 3.  Severe systemic hypertension. 4.  Mildly elevated left ventricular end-diastolic pressure.     Ty Felder MD     D: 10/09/2020 13:23:37         Problem List:  Active Hospital Problems    Diagnosis    Essential hypertension, benign [I10]    Atrial fibrillation with rapid ventricular response ([address] [I48.91]    Right bundle branch block [I45.10]    Non-STEMI (non-ST elevated myocardial infarction) ([address] [I21.4]           ASSESSMENT/PLAN:  1. Non-STEMI/severe calcific three-vessel CAD and normal LVEF. Await CTS consult. Continue current medical management    2. Paroxysmal atrial fibrillation in setting of non-STEMI, with spontaneous conversion to sinus. Chads vascular score 6. Hold [address] until CTS consult. Continue beta-blocker    3. Chronic stable right bundle branch block    4. Hypertension controlled    5. Diabetes/hyperlipidemia.   Continue current management      Electronically signed by Huong Macedo MD on 10/10/2020 at 7:14 AM

## 2020-10-10 NOTE — CONSULTS
CTS Consult    Patient name: Jacquie Ma    Reason for consult: NSTEMI, CAD    Referring Physician: Dr. Elia Fernandez, Dr. Kailee Gleason    Primary Care Physician: Harsh Still DO    Date of service: 10/10/2020    Chief Complaint: CP    HPI: 80year old female who lives at home and is relatively active presented to ER with burning pain in her upper chest and throat. She states she though it was heartburn. The pain worsened and began to radiate to her shoulders. She was admitted and ruled in for a NSTEMI. She went to heart cath and severe MV CAD was found. Currently she denies CP, SOB, WOOD, N/V, F/C, orthopnea, PND and syncope.     Allergies: No Known Allergies    Home medications:    Current Facility-Administered Medications   Medication Dose Route Frequency Provider Last Rate Last Dose    0.9 % sodium chloride infusion   Intravenous Continuous Khadijah Singer DO        dilTIAZem 100 mg in dextrose 5 % 100 mL infusion ([facility])  5 mg/hr Intravenous Continuous Oracio Bell MD   Stopped at 10/09/20 1613    sodium chloride flush 0.9 % injection 10 mL  10 mL Intravenous 2 times per day Oracio Bell MD   10 mL at 10/10/20 0852    sodium chloride flush 0.9 % injection 10 mL  10 mL Intravenous PRN Oracio Bell MD        potassium chloride (KLOR-CON M) extended release tablet 40 mEq  40 mEq Oral PRN Oracio Bell MD        Or    potassium bicarb-citric acid (EFFER-K) effervescent tablet 40 mEq  40 mEq Oral PRN Oracio Bell MD        Or    potassium chloride 10 mEq/100 mL IVPB (Peripheral Line)  10 mEq Intravenous PRN Oracio Bell MD        acetaminophen (TYLENOL) tablet 650 mg  650 mg Oral Q6H PRN Oracio Bell MD   650 mg at 10/09/20 2151    Or    acetaminophen (TYLENOL) suppository 650 mg  650 mg Rectal Q6H PRN Oracio Bell MD        [facility]) tablet 8.6 mg  1 tablet Oral Daily PRN Oracio Bell MD        promethazine (PHENERGAN) tablet 12.5 mg  12.5 mg Oral Q6H PRN Oracio Bell MD Or    ondansetron (ZOFRAN) injection 4 mg  4 mg Intravenous Q6H PRN Everardo Mosley MD        ezetimibe (ZETIA) tablet 10 mg  10 mg Oral Nightly Everardo Mosley MD   10 mg at 10/09/20 2102    insulin lispro (HUMALOG) injection vial 0-6 Units  0-6 Units Subcutaneous TID WC Everardo Mosley MD   1 Units at 10/09/20 1730    insulin lispro (HUMALOG) injection vial 0-3 Units  0-3 Units Subcutaneous Nightly Everardo Mosley MD   1 Units at 10/09/20 2130    glucose (GLUTOSE) 40 % oral gel 15 g  15 g Oral PRN Everardo Mosley MD        dextrose 50 % IV solution  12.5 g Intravenous PRN Everardo Mosley MD        glucagon (rDNA) injection 1 mg  1 mg Intramuscular PRN Everardo Mosley MD        dextrose 5 % solution  100 mL/hr Intravenous PRN Everardo Mosley MD        pantoprazole (PROTONIX) tablet 40 mg  40 mg Oral QAM AC Tigre Aubree Fulton MD   40 mg at 10/10/20 0630    influenza quadrivalent split vaccine (FLUZONE;FLUARIX;FLULAVAL;AFLURIA) injection 0.5 mL  0.5 mL Intramuscular Prior to discharge Everardo Mosley MD        calcium carbonate (TUMS) chewable tablet 1,000 mg  1,000 mg Oral TID PRN Everardo Mosley MD   1,000 mg at 10/09/20 0534    aspirin chewable tablet 81 mg  81 mg Oral Daily Everardo Mosley MD   81 mg at 10/10/20 0856    0.9 % sodium chloride infusion   Intravenous Continuous Everardo Mosley MD 20 mL/hr at 10/09/20 1343      0.9 % sodium chloride infusion   Intravenous Continuous Everardo Mosley MD 20 mL/hr at 10/09/20 1343      losartan (COZAAR) tablet 100 mg  100 mg Oral Daily Felisa Sanford MD   100 mg at 10/10/20 4603    isosorbide mononitrate (IMDUR) extended release tablet 60 mg  60 mg Oral Daily Felisa Sanford MD   60 mg at 10/10/20 8918    metoprolol succinate (TOPROL XL) extended release tablet 25 mg  25 mg Oral BID Felisa Sanford MD   25 mg at 10/10/20 6920       Past Medical History:  Past Medical History:   Diagnosis Date    Diabetes ([facility]     Hypertension     Left renal artery stenosis ([address] 2018    Renal artery stenosis [facility])        Past Surgical History:  Past Surgical History:   Procedure Laterality Date    BLEPHAROPLASTY Bilateral     CARDIAC CATHETERIZATION  10/09/2020    Dr Bogdan Norris Right     cataract    HAND SURGERY Bilateral     trigger thumb    HYSTERECTOMY      KNEE SURGERY         Social History:  Social History     Socioeconomic History    Marital status:      Spouse name: Not on file    Number of children: Not on file    Years of education: Not on file    Highest education level: Not on file   Occupational History    Not on file   Social Needs    Financial resource strain: Not on file    Food insecurity     Worry: Not on file     Inability: Not on file    Transportation needs     Medical: Not on file     Non-medical: Not on file   Tobacco Use    Smoking status: Former Smoker     Packs/day: 0.50     Types: Cigarettes     Last attempt to quit: 3/31/2020     Years since quittin.5    Smokeless tobacco: Never Used   Substance and Sexual Activity    Alcohol use: No     Comment: rare    Drug use: No    Sexual activity: Not Currently   Lifestyle    Physical activity     Days per week: Not on file     Minutes per session: Not on file    Stress: Not on file   Relationships    Social connections     Talks on phone: Not on file     Gets together: Not on file     Attends Nondenominational service: Not on file     Active member of club or organization: Not on file     Attends meetings of clubs or organizations: Not on file     Relationship status: Not on file    Intimate partner violence     Fear of current or ex partner: Not on file     Emotionally abused: Not on file     Physically abused: Not on file     Forced sexual activity: Not on file   Other Topics Concern    Not on file   Social History Narrative    Not on file       Family History:  History reviewed. No pertinent family history.     Review of Systems:  Constitutional: Denies fevers, chills, or weight loss. HEENT: Denies visual changes or hearing loss. Heart: As per HPI. Lungs: Denies shortness of breath, cough, or wheezing. Gastrointestinal: Denies nausea, vomiting, constipation, or diarrhea. Genitourinary: dysuria or hematuria. Psychiatric: Patient denies anxiety or depression. Neurologic: Patient denies weakness of the extremities, dizziness, or headaches. All other ROS checked and found to be negative. Labs:  Recent Labs     10/08/20  2350 10/09/20  0521   WBC 11.4 8.4   HGB 16.1* 14.0   HCT 48.1* 42.0    242      Recent Labs     10/08/20  2350 10/09/20  0521 10/10/20  0638   BUN 29* 26* 29*   CREATININE 1.0 1.0 1.2*       Objective:  Vitals /70   Pulse 79   Temp 98.9 °F (37.2 °C) (Oral)   Resp 14   Ht 5' 3\" (1.6 m)   Wt 157 lb 6.4 oz (71.4 kg)   SpO2 96%   BMI 27.88 kg/m²   General Appearance: Pleasant 80y.o. year old female who appears stated age. Communicates well, no acute distress. HEENT: Head is normocephalic, atraumatic. EOMs intact, PERRL. Trachea midline. Lungs: Normal respiratory rate and normal effort. She is not in respiratory distress. Breath sounds clear to auscultation. No wheezes. Heart: Normal rate. Regular rhythm. S1 normal and S2 normal. Negative for murmur. Chest: Symmetric chest wall expansion. Extremities: Normal range of motion. Neurological: Patient is alert and oriented to person, place and time. Patient has normal reflexes. Skin: Warm and dry. Abdomen: Abdomen is soft and non-distended. Bowel sounds are normal. There is no abdominal tenderness tenderness. There is no guarding. There is no mass. Pulses: Distal pulses are intact. Skin: Warm and dry without lesions. Assessment: NSTEMI, CAD        Plan: She has multiple sequential lesions on each of her coronary arteries, especially her LAD which has proximal, mid and distal disease.   She also has significant calcification of her coronaries. Full pre op work up but especially a NC CT chest to see just how calcified these targets are. The son asked if [facility] will be coming down to see his Mom to which I responded that they do not come here but if he wished for them to see her she would have to be transferred there. Will follow along.         Electronically signed by Yudith Lemon MD on 10/10/2020 at 9:57 AM

## 2020-10-10 NOTE — PROGRESS NOTES
dependent edema. Neurological: Patient is alert and oriented to person, place and time. Skin:  Warm and dry. Labs/Imaging/Diagnostics    Labs:  CBC:  Recent Labs     10/08/20  2350 10/09/20  0521   WBC 11.4 8.4   RBC 5.28 4.67   HGB 16.1* 14.0   HCT 48.1* 42.0   MCV 91.1 89.9   RDW 12.7 12.7    242     CHEMISTRIES:  Recent Labs     10/08/20  2350 10/09/20  0521    142   K 3.6 3.5   CL 98 99   CO2 25 28   BUN 29* 26*   CREATININE 1.0 1.0   GLUCOSE 177* 144*   MG  --  1.2*     PT/INR:No results for input(s): PROTIME, INR in the last 72 hours. APTT:No results for input(s): APTT in the last 72 hours. LIVER PROFILE:  Recent Labs     10/08/20  2350 10/09/20  0521   AST 21 24   ALT 22 19   BILITOT 0.4 0.3   ALKPHOS 132* 114*       Imaging Last 24 Hours:  Xr Chest Portable    Result Date: 10/9/2020  EXAMINATION: ONE XRAY VIEW OF THE CHEST 10/8/2020 11:30 pm COMPARISON: 09/26/2020. HISTORY: ORDERING SYSTEM PROVIDED HISTORY: chest pain TECHNOLOGIST PROVIDED HISTORY: Reason for exam:->chest pain FINDINGS: The lungs are without acute focal process. There is no effusion or pneumothorax. The cardiomediastinal silhouette is without acute process. The osseous structures are without acute process. Right hemidiaphragm is moderately elevated. No acute process. Assessment//Plan           Hospital Problems           Last Modified POA    Atrial fibrillation with rapid ventricular response ([address] 10/9/2020 Yes    Right bundle branch block 10/9/2020 Yes    Non-STEMI (non-ST elevated myocardial infarction) ([address] 10/9/2020 Yes        Assessment & Plan  1. NSTEMI  2. Paroxysmal Afib  3. Diabetes Mellitus Type II   4. Hypertension  5. Tobacco Abuse  6. Lumbar spinal stenosis    Cardiothoracic surgery consulted. Cardiac cath reviewed. Blood pressure better controlled this am. Slight increase in creatinine. Start IVF. Monitor kidney function.      Emani Saba DO    Electronically signed by Jason Lopez DO [place] on 10/10/20 at 5:28 AM EDT

## 2020-10-10 NOTE — PLAN OF CARE
Problem: Infection:  Goal: Will remain free from infection  Description: Will remain free from infection  Outcome: Met This Shift

## 2020-10-11 PROBLEM — I48.91 ATRIAL FIBRILLATION WITH RAPID VENTRICULAR RESPONSE (HCC): Status: RESOLVED | Noted: 2020-10-09 | Resolved: 2020-10-11

## 2020-10-11 LAB
ALBUMIN SERPL-MCNC: 3.6 G/DL (ref 3.5–5.2)
ALP BLD-CCNC: 118 U/L (ref 35–104)
ALT SERPL-CCNC: 18 U/L (ref 0–32)
ANION GAP SERPL CALCULATED.3IONS-SCNC: 14 MMOL/L (ref 7–16)
AST SERPL-CCNC: 18 U/L (ref 0–31)
BASOPHILS ABSOLUTE: 0.04 E9/L (ref 0–0.2)
BASOPHILS RELATIVE PERCENT: 0.6 % (ref 0–2)
BILIRUB SERPL-MCNC: 0.4 MG/DL (ref 0–1.2)
BUN BLDV-MCNC: 28 MG/DL (ref 8–23)
CALCIUM SERPL-MCNC: 9 MG/DL (ref 8.6–10.2)
CHLORIDE BLD-SCNC: 103 MMOL/L (ref 98–107)
CO2: 27 MMOL/L (ref 22–29)
CREAT SERPL-MCNC: 1.1 MG/DL (ref 0.5–1)
EOSINOPHILS ABSOLUTE: 0.1 E9/L (ref 0.05–0.5)
EOSINOPHILS RELATIVE PERCENT: 1.5 % (ref 0–6)
GFR AFRICAN AMERICAN: 57
GFR NON-AFRICAN AMERICAN: 47 ML/MIN/1.73
GLUCOSE BLD-MCNC: 113 MG/DL (ref 74–99)
HCT VFR BLD CALC: 38 % (ref 34–48)
HEMOGLOBIN: 12.6 G/DL (ref 11.5–15.5)
IMMATURE GRANULOCYTES #: 0.02 E9/L
IMMATURE GRANULOCYTES %: 0.3 % (ref 0–5)
LYMPHOCYTES ABSOLUTE: 1.73 E9/L (ref 1.5–4)
LYMPHOCYTES RELATIVE PERCENT: 25.3 % (ref 20–42)
MCH RBC QN AUTO: 30.1 PG (ref 26–35)
MCHC RBC AUTO-ENTMCNC: 33.2 % (ref 32–34.5)
MCV RBC AUTO: 90.7 FL (ref 80–99.9)
METER GLUCOSE: 140 MG/DL (ref 74–99)
METER GLUCOSE: 151 MG/DL (ref 74–99)
METER GLUCOSE: 157 MG/DL (ref 74–99)
METER GLUCOSE: 96 MG/DL (ref 74–99)
MONOCYTES ABSOLUTE: 0.57 E9/L (ref 0.1–0.95)
MONOCYTES RELATIVE PERCENT: 8.3 % (ref 2–12)
NEUTROPHILS ABSOLUTE: 4.37 E9/L (ref 1.8–7.3)
NEUTROPHILS RELATIVE PERCENT: 64 % (ref 43–80)
PDW BLD-RTO: 12.8 FL (ref 11.5–15)
PLATELET # BLD: 216 E9/L (ref 130–450)
PMV BLD AUTO: 10.6 FL (ref 7–12)
POTASSIUM SERPL-SCNC: 3.6 MMOL/L (ref 3.5–5)
RBC # BLD: 4.19 E12/L (ref 3.5–5.5)
SARS-COV-2, NAAT: NOT DETECTED
SODIUM BLD-SCNC: 144 MMOL/L (ref 132–146)
TOTAL PROTEIN: 6.2 G/DL (ref 6.4–8.3)
WBC # BLD: 6.8 E9/L (ref 4.5–11.5)

## 2020-10-11 PROCEDURE — U0002 COVID-19 LAB TEST NON-CDC: HCPCS

## 2020-10-11 PROCEDURE — 6370000000 HC RX 637 (ALT 250 FOR IP): Performed by: INTERNAL MEDICINE

## 2020-10-11 PROCEDURE — 36415 COLL VENOUS BLD VENIPUNCTURE: CPT

## 2020-10-11 PROCEDURE — 80053 COMPREHEN METABOLIC PANEL: CPT

## 2020-10-11 PROCEDURE — 99232 SBSQ HOSP IP/OBS MODERATE 35: CPT | Performed by: THORACIC SURGERY (CARDIOTHORACIC VASCULAR SURGERY)

## 2020-10-11 PROCEDURE — 2580000003 HC RX 258: Performed by: INTERNAL MEDICINE

## 2020-10-11 PROCEDURE — 6360000002 HC RX W HCPCS: Performed by: INTERNAL MEDICINE

## 2020-10-11 PROCEDURE — 82962 GLUCOSE BLOOD TEST: CPT

## 2020-10-11 PROCEDURE — 2140000000 HC CCU INTERMEDIATE R&B

## 2020-10-11 PROCEDURE — 85025 COMPLETE CBC W/AUTO DIFF WBC: CPT

## 2020-10-11 RX ORDER — HYDROCODONE BITARTRATE AND ACETAMINOPHEN 5; 325 MG/1; MG/1
1 TABLET ORAL EVERY 6 HOURS PRN
Qty: 12 TABLET | Refills: 0 | Status: SHIPPED | OUTPATIENT
Start: 2020-10-11 | End: 2020-10-14

## 2020-10-11 RX ORDER — METOPROLOL SUCCINATE 50 MG/1
50 TABLET, EXTENDED RELEASE ORAL 2 TIMES DAILY
Status: DISCONTINUED | OUTPATIENT
Start: 2020-10-11 | End: 2020-10-14 | Stop reason: HOSPADM

## 2020-10-11 RX ORDER — NICOTINE POLACRILEX 4 MG
15 LOZENGE BUCCAL PRN
Qty: 45 G | Refills: 0 | Status: SHIPPED | OUTPATIENT
Start: 2020-10-11 | End: 2021-01-13

## 2020-10-11 RX ORDER — PROMETHAZINE HYDROCHLORIDE 12.5 MG/1
12.5 TABLET ORAL EVERY 6 HOURS PRN
Qty: 28 TABLET | Refills: 0 | Status: SHIPPED | OUTPATIENT
Start: 2020-10-11 | End: 2020-10-18

## 2020-10-11 RX ORDER — HYDROCODONE BITARTRATE AND ACETAMINOPHEN 5; 325 MG/1; MG/1
1 TABLET ORAL EVERY 6 HOURS PRN
Status: DISCONTINUED | OUTPATIENT
Start: 2020-10-11 | End: 2020-10-14 | Stop reason: HOSPADM

## 2020-10-11 RX ORDER — METOPROLOL SUCCINATE 50 MG/1
50 TABLET, EXTENDED RELEASE ORAL 2 TIMES DAILY
Qty: 30 TABLET | Refills: 3 | Status: SHIPPED | OUTPATIENT
Start: 2020-10-11

## 2020-10-11 RX ORDER — PANTOPRAZOLE SODIUM 40 MG/1
40 TABLET, DELAYED RELEASE ORAL
Qty: 30 TABLET | Refills: 3 | Status: SHIPPED | OUTPATIENT
Start: 2020-10-12 | End: 2021-01-13 | Stop reason: ALTCHOICE

## 2020-10-11 RX ORDER — EZETIMIBE 10 MG/1
10 TABLET ORAL NIGHTLY
Qty: 30 TABLET | Refills: 3 | Status: SHIPPED | OUTPATIENT
Start: 2020-10-11

## 2020-10-11 RX ORDER — ISOSORBIDE MONONITRATE 30 MG/1
90 TABLET, EXTENDED RELEASE ORAL DAILY
Qty: 30 TABLET | Refills: 3 | Status: SHIPPED | OUTPATIENT
Start: 2020-10-12 | End: 2021-01-13 | Stop reason: ALTCHOICE

## 2020-10-11 RX ORDER — SENNA PLUS 8.6 MG/1
1 TABLET ORAL DAILY PRN
Qty: 7 TABLET | Refills: 0 | Status: SHIPPED | OUTPATIENT
Start: 2020-10-11 | End: 2020-11-10

## 2020-10-11 RX ORDER — CALCIUM CARBONATE 200(500)MG
1000 TABLET,CHEWABLE ORAL 3 TIMES DAILY PRN
Qty: 30 TABLET | Refills: 0 | Status: SHIPPED | OUTPATIENT
Start: 2020-10-11 | End: 2020-11-10

## 2020-10-11 RX ORDER — ASPIRIN 81 MG/1
81 TABLET, CHEWABLE ORAL DAILY
Qty: 30 TABLET | Refills: 3 | Status: SHIPPED | OUTPATIENT
Start: 2020-10-12 | End: 2022-05-30

## 2020-10-11 RX ORDER — POTASSIUM CHLORIDE 20 MEQ/1
40 TABLET, EXTENDED RELEASE ORAL PRN
Qty: 60 TABLET | Refills: 3 | Status: SHIPPED | OUTPATIENT
Start: 2020-10-11 | End: 2021-01-13

## 2020-10-11 RX ADMIN — INSULIN LISPRO 1 UNITS: 100 INJECTION, SOLUTION INTRAVENOUS; SUBCUTANEOUS at 16:57

## 2020-10-11 RX ADMIN — LOSARTAN POTASSIUM 100 MG: 50 TABLET, FILM COATED ORAL at 09:02

## 2020-10-11 RX ADMIN — ENOXAPARIN SODIUM 70 MG: 80 INJECTION SUBCUTANEOUS at 11:25

## 2020-10-11 RX ADMIN — EZETIMIBE 10 MG: 10 TABLET ORAL at 21:03

## 2020-10-11 RX ADMIN — ISOSORBIDE MONONITRATE 60 MG: 60 TABLET ORAL at 09:02

## 2020-10-11 RX ADMIN — SODIUM CHLORIDE: 9 INJECTION, SOLUTION INTRAVENOUS at 02:30

## 2020-10-11 RX ADMIN — SODIUM CHLORIDE, PRESERVATIVE FREE 10 ML: 5 INJECTION INTRAVENOUS at 21:03

## 2020-10-11 RX ADMIN — INSULIN LISPRO 1 UNITS: 100 INJECTION, SOLUTION INTRAVENOUS; SUBCUTANEOUS at 11:28

## 2020-10-11 RX ADMIN — METOPROLOL SUCCINATE 25 MG: 25 TABLET, EXTENDED RELEASE ORAL at 09:02

## 2020-10-11 RX ADMIN — INSULIN LISPRO 1 UNITS: 100 INJECTION, SOLUTION INTRAVENOUS; SUBCUTANEOUS at 21:03

## 2020-10-11 RX ADMIN — METOPROLOL SUCCINATE 50 MG: 50 TABLET, EXTENDED RELEASE ORAL at 21:02

## 2020-10-11 RX ADMIN — ENOXAPARIN SODIUM 70 MG: 80 INJECTION SUBCUTANEOUS at 21:03

## 2020-10-11 RX ADMIN — PANTOPRAZOLE SODIUM 40 MG: 40 TABLET, DELAYED RELEASE ORAL at 06:50

## 2020-10-11 RX ADMIN — HYDROCODONE BITARTRATE AND ACETAMINOPHEN 1 TABLET: 5; 325 TABLET ORAL at 09:02

## 2020-10-11 RX ADMIN — ASPIRIN 81 MG CHEWABLE TABLET 81 MG: 81 TABLET CHEWABLE at 09:02

## 2020-10-11 RX ADMIN — HYDROCODONE BITARTRATE AND ACETAMINOPHEN 1 TABLET: 5; 325 TABLET ORAL at 21:02

## 2020-10-11 ASSESSMENT — ENCOUNTER SYMPTOMS
DIARRHEA: 0
VOMITING: 0
COUGH: 0
SHORTNESS OF BREATH: 0
NAUSEA: 0

## 2020-10-11 ASSESSMENT — PAIN SCALES - GENERAL
PAINLEVEL_OUTOF10: 8
PAINLEVEL_OUTOF10: 8
PAINLEVEL_OUTOF10: 5
PAINLEVEL_OUTOF10: 5
PAINLEVEL_OUTOF10: 0
PAINLEVEL_OUTOF10: 0

## 2020-10-11 ASSESSMENT — PAIN DESCRIPTION - FREQUENCY: FREQUENCY: CONTINUOUS

## 2020-10-11 ASSESSMENT — PAIN DESCRIPTION - PROGRESSION
CLINICAL_PROGRESSION: NOT CHANGED
CLINICAL_PROGRESSION: NOT CHANGED

## 2020-10-11 ASSESSMENT — PAIN DESCRIPTION - DESCRIPTORS: DESCRIPTORS: ACHING;CONSTANT;SORE

## 2020-10-11 ASSESSMENT — PAIN DESCRIPTION - PAIN TYPE: TYPE: CHRONIC PAIN

## 2020-10-11 ASSESSMENT — PAIN DESCRIPTION - ONSET: ONSET: ON-GOING

## 2020-10-11 ASSESSMENT — PAIN - FUNCTIONAL ASSESSMENT: PAIN_FUNCTIONAL_ASSESSMENT: ACTIVITIES ARE NOT PREVENTED

## 2020-10-11 ASSESSMENT — PAIN DESCRIPTION - ORIENTATION: ORIENTATION: LOWER;MID

## 2020-10-11 ASSESSMENT — PAIN DESCRIPTION - LOCATION: LOCATION: BACK

## 2020-10-11 NOTE — PROGRESS NOTES
Call to Dr. Ty Klein answering service in regards to CT s/o case and possible DC. Call to Dr. Jose Nixon to check if cardiology is okay with pt discahrge. Awaiting return calls. Spouse

## 2020-10-11 NOTE — PROGRESS NOTES
[facility] Cardiology    INPATIENT CARDIOLOGY FOLLOW-UP    Name: June Junes    Age: 80 y.o. Date of Admission: 10/8/2020 11:13 PM    Date of Service: 10/11/2020    Chief Complaint: Follow-up for non-STEMI, PAF    Interim History:  Had vague throat tightness consistent with angina over the past 24 hours. Heart catheterization films reviewed. Severe calcific multivessel CAD with normal LVEF. CTS notes acknowledged.       Telemetry  reviewed and showed sinus rhythm        Review of Systems:   Cardiac: As per HPI  General: No fever, chills  Pulmonary: No cough, wheeze, or shortness of breath  GI: No nausea, vomiting,or abdominal pain  Neuro: No headache or confusion      Allergies:  No Known Allergies    Current Medications:  Current Facility-Administered Medications   Medication Dose Route Frequency Provider Last Rate Last Dose    HYDROcodone-acetaminophen (NORCO) 5-325 MG per tablet 1 tablet  1 tablet Oral Q6H PRN Khadijahyojana Singer DO   1 tablet at 10/11/20 0902    0.9 % sodium chloride infusion   Intravenous Continuous Khadijah GEOVANNY Singer DO 75 mL/hr at 10/11/20 0230      dilTIAZem 100 mg in dextrose 5 % 100 mL infusion ([facility])  5 mg/hr Intravenous Continuous Beatriz Kwon MD   Stopped at 10/09/20 1613    sodium chloride flush 0.9 % injection 10 mL  10 mL Intravenous 2 times per day Beatriz Kwon MD   Stopped at 10/11/20 0903    sodium chloride flush 0.9 % injection 10 mL  10 mL Intravenous PRN Beatriz Kwon MD        potassium chloride (KLOR-CON M) extended release tablet 40 mEq  40 mEq Oral PRN Beatriz Kwon MD        Or    potassium bicarb-citric acid (EFFER-K) effervescent tablet 40 mEq  40 mEq Oral PRN Beatriz Kwon MD        Or    potassium chloride 10 mEq/100 mL IVPB (Peripheral Line)  10 mEq Intravenous PRN Beatriz Kwon MD        acetaminophen (TYLENOL) tablet 650 mg  650 mg Oral Q6H PRN Beatriz Kwon MD   650 mg at 10/10/20 2151    Or    acetaminophen (TYLENOL) suppository 650 mg  650 mg Rectal Q6H PRN Maggie Rose MD        [facility]) tablet 8.6 mg  1 tablet Oral Daily PRN Maggie Rose MD        promethazine (PHENERGAN) tablet 12.5 mg  12.5 mg Oral Q6H PRN Maggie Rose MD        Or    [facility]etron [facility]) injection 4 mg  4 mg Intravenous Q6H PRN Maggie Rose MD        ezetimibe (ZETIA) tablet 10 mg  10 mg Oral Nightly Maggie Rose MD   10 mg at 10/10/20 2151    insulin lispro (HUMALOG) injection vial 0-6 Units  0-6 Units Subcutaneous TID WC Maggie Rose MD   1 Units at 10/10/20 1553    insulin lispro (HUMALOG) injection vial 0-3 Units  0-3 Units Subcutaneous Nightly Maggie Rose MD   1 Units at 10/10/20 2155    glucose (GLUTOSE) 40 % oral gel 15 g  15 g Oral PRN Maggie Rose MD        dextrose 50 % IV solution  12.5 g Intravenous PRN Maggie Rose MD        glucagon (rDNA) injection 1 mg  1 mg Intramuscular PRN Maggie Rose MD        dextrose 5 % solution  100 mL/hr Intravenous PRN Maggie Rose MD        pantoprazole (PROTONIX) tablet 40 mg  40 mg Oral QAM AC Marisameeraus Jetty Andrew Cortez MD   40 mg at 10/11/20 0650    influenza quadrivalent split vaccine (FLUZONE;FLUARIX;FLULAVAL;AFLURIA) injection 0.5 mL  0.5 mL Intramuscular Prior to discharge Maggie Rose MD        calcium carbonate (TUMS) chewable tablet 1,000 mg  1,000 mg Oral TID PRN Maggie Rose MD   1,000 mg at 10/09/20 0534    aspirin chewable tablet 81 mg  81 mg Oral Daily Maggie Rose MD   81 mg at 10/11/20 0902    0.9 % sodium chloride infusion   Intravenous Continuous Maggie Rose MD 20 mL/hr at 10/09/20 1343      0.9 % sodium chloride infusion   Intravenous Continuous Maggie Rose MD 20 mL/hr at 10/09/20 1343      losartan (COZAAR) tablet 100 mg  100 mg Oral Daily Jayshree Mcarthur MD   100 mg at 10/11/20 0902    isosorbide mononitrate (IMDUR) extended release tablet 60 mg  60 mg Oral Daily Jayshree Mcarthur MD   60 mg at 10/11/20 0902    metoprolol succinate (TOPROL XL) extended release tablet 25 mg  25 mg Oral BID Gissell De Leon MD   25 mg at 10/11/20 0902      sodium chloride 75 mL/hr at 10/11/20 0230    diltiazem (CARDIZEM) 100 mg in dextrose 5% 100 mL ([facility]) Stopped (10/09/20 1613)    dextrose      sodium chloride 20 mL/hr at 10/09/20 1343    sodium chloride 20 mL/hr at 10/09/20 1343       Physical Exam:  /66   Pulse 78   Temp 97.4 °F (36.3 °C) (Temporal)   Resp 16   Ht 5' 3\" (1.6 m)   Wt 157 lb 6.4 oz (71.4 kg)   SpO2 94%   BMI 27.88 kg/m²   Weight change: Wt Readings from Last 3 Encounters:   10/10/20 157 lb 6.4 oz (71.4 kg)   09/26/20 160 lb (72.6 kg)   08/18/20 160 lb (72.6 kg)         Intake/Output:    Intake/Output Summary (Last 24 hours) at 10/11/2020 1005  Last data filed at 10/11/2020 0651  Gross per 24 hour   Intake 1105 ml   Output --   Net 1105 ml     No intake/output data recorded. General: Awake, alert, oriented x3, no acute distress    Neck: No JVD or carotid bruits,     Cardiac: Regular rate and rhythm, normal S1 and  S2, no murmurs, no S4 or S3, no  rubs. Normal carotid upstroke and no bruits. Resp: Unlabored respirations at rest.  No wheezes, rales or rhonchi. Abdomen: soft, nontender, nondistended, BS+; no masses, bruits, or hepatomegaly    Extremities: no cyanosis, clubbing, or edema. Normal pulses in the upper/lower extremities bilaterally. Skin: Warm and dry, no bruises, petechiae or rashes. Neuro: moves all extremities appropriately to command, and normal sensation to light touch in the upper and lower extremities bilaterally.       Laboratory Tests:  Lab Results   Component Value Date    CREATININE 1.1 (H) 10/11/2020    BUN 28 (H) 10/11/2020     10/11/2020    K 3.6 10/11/2020     10/11/2020    CO2 27 10/11/2020     Recent Labs     10/08/20  2350 10/09/20  0521 10/09/20  1048   TROPONINI 0.15* 0.29* 0.44*     Lab Results   Component Value Date    PROBNP 2,310 (H) 10/09/2020     Lab Results   Component Value Date    WBC 6.8 10/11/2020    RBC 4.19 10/11/2020    HGB 12.6 10/11/2020    HCT 38.0 10/11/2020    MCV 90.7 10/11/2020    MCH 30.1 10/11/2020    MCHC 33.2 10/11/2020    RDW 12.8 10/11/2020     10/11/2020    MPV 10.6 10/11/2020     Recent Labs     10/08/20  2350 10/09/20  0521 10/11/20  0836   ALKPHOS 132* 114* 118*   ALT 22 19 18   AST 21 24 18   PROT 7.5 6.1* 6.2*   BILITOT 0.4 0.3 0.4   LABALBU 4.0 3.6 3.6     Lab Results   Component Value Date    MG 1.2 10/09/2020     No results found for: PROTIME, INR  Lab Results   Component Value Date    TSH 1.730 10/09/2020     No components found for: CHLPL  Lab Results   Component Value Date    TRIG 191 (H) 10/09/2020     Lab Results   Component Value Date    HDL 54 10/09/2020     Lab Results   Component Value Date    LDLCALC 84 10/09/2020       Radiology:  US DUP LOWER EXTREMITY MAPPING BILAT VENOUS   Final Result   Bilateral venous mapping as described. No deep venous   thrombosis visualized. US CAROTID ARTERY BILATERAL   Final Result   Atherosclerotic disease. No hemodynamically significant stenosis is   identified   Estimated stenosis by NASCET criteria in the proximal right carotid   artery is between 0% and 49%. Estimated stenosis by NASCET criteria in the proximal left carotid   artery is between 0% and 49%. CT CHEST WO CONTRAST   Final Result   1. 1.5 x 1.7 cm vague ground-glass opacity seen within the right upper lobe   of indeterminate etiology. This could represent postinflammatory scarring,   residual inflammatory process in a patient with previously identified   multifocal bilateral ground-glass nodular infiltrates. Atelectasis is less   likely. 2. There are no gross findings of pneumonia and there is no pleural effusion. 3. Chronic significant elevation of the right hemidiaphragm. XR CHEST PORTABLE   Final Result   No acute process.          VL ADA BILATERAL LIMITED 1-2 LEVELS    (Results Pending)     Heart catheterization CONCLUSIONS:  1. Severe calcific three-vessel coronary artery disease as described  above. 2.  Normal left ventricular size with no gross regional wall motion  abnormality noted on the 35-degree KOWALSKI view with preserved global left  ventricular systolic function with an estimated ejection fraction of 55%  to 60%. 3.  Severe systemic hypertension. 4.  Mildly elevated left ventricular end-diastolic pressure.     Kem Kirby MD     D: 10/09/2020 13:23:37         Problem List:  Active Hospital Problems    Diagnosis    Essential hypertension, benign [I10]    Atrial fibrillation with rapid ventricular response ([address] [I48.91]    Right bundle branch block [I45.10]    Non-STEMI (non-ST elevated myocardial infarction) ([address] [I21.4]           ASSESSMENT/PLAN:  1. Non-STEMI/severe calcific three-vessel CAD and normal LVEF. Postinfarct angina. Severe calcific multivessel/multi lesion CAD very high risk for PCI. CTS does not feel she is best treated with CABG. Will need second opinion,? [place]. Increase beta-blocker and nitrate. Continue aspirin, and start full dose Lovenox due to postinfarct angina. 2. Paroxysmal atrial fibrillation in setting of non-STEMI, with spontaneous conversion to sinus. Chads vascular score 6. Hold [address] until CTS consult. Continue beta-blocker    3. Chronic stable right bundle branch block    4. Hypertension controlled    5. Diabetes/hyperlipidemia.   Continue current management      Electronically signed by Courtney Whyte MD on 10/11/2020 at 10:05 AM

## 2020-10-11 NOTE — PROGRESS NOTES
Received call from nursing about CTS and cardiology recommendations. Contacted transfer center to initiate transfer to [facility]. Awaiting call back. Received call back from Dr. Mara Murray and discussed patient he has accepted her. Transfer center to contact floor. Ok to transfer patient when bed available.      Leah Shannan Singer, DO

## 2020-10-11 NOTE — PROGRESS NOTES
Spoke to on call CM regarding patient being transferred to [facility]. Request for Covid test. CM to bring to nurses station.

## 2020-10-11 NOTE — PLAN OF CARE
Problem: Infection:  Goal: Will remain free from infection  Description: Will remain free from infection  10/10/2020 2317 by Aliza Mcghee RN  Outcome: Met This Shift  10/10/2020 1834 by Meka Hunter RN  Outcome: Met This Shift

## 2020-10-11 NOTE — PLAN OF CARE
Problem: Infection:  Goal: Will remain free from infection  Description: Will remain free from infection  Outcome: Met This Shift     Problem: Safety:  Goal: Free from accidental physical injury  Description: Free from accidental physical injury  Outcome: Met This Shift  Goal: Free from intentional harm  Description: Free from intentional harm  Outcome: Met This Shift     Problem: Daily Care:  Goal: Daily care needs are met  Description: Daily care needs are met  Outcome: Met This Shift     Problem: Pain:  Goal: Patient's pain/discomfort is manageable  Description: Patient's pain/discomfort is manageable  Outcome: Not Met This Shift  Goal: Pain level will decrease  Description: Pain level will decrease  Outcome: Not Met This Shift  Goal: Control of acute pain  Description: Control of acute pain  Outcome: Met This Shift  Goal: Control of chronic pain  Description: Control of chronic pain  Outcome: Met This Shift     Problem: Skin Integrity:  Goal: Skin integrity will stabilize  Description: Skin integrity will stabilize  Outcome: Met This Shift     Problem: Discharge Planning:  Goal: Patients continuum of care needs are met  Description: Patients continuum of care needs are met  Outcome: Met This Shift     Problem: Falls - Risk of:  Goal: Will remain free from falls  Description: Will remain free from falls  Outcome: Met This Shift  Goal: Absence of physical injury  Description: Absence of physical injury  Outcome: Met This Shift     Problem: Cardiac Output - Decreased:  Goal: Hemodynamic stability will improve  Description: Hemodynamic stability will improve  Outcome: Met This Shift

## 2020-10-11 NOTE — PROGRESS NOTES
Progress Note  Date:10/11/2020       ETCO:[id number]  Patient Name:Nora Sun     Date of Birth:     Age:85 y.o. Patient seen for follow up of NSTEMI and afib. Patient denies any current chest pain, shortness of breath, nausea, vomiting or diarrhea. She does have some low back pain from the bed. Subjective    Subjective:  Symptoms:  No shortness of breath, cough, chest pain, headache, chest pressure or diarrhea. Diet:  No nausea or vomiting. Review of Systems   Respiratory: Negative for cough and shortness of breath. Cardiovascular: Negative for chest pain. Gastrointestinal: Negative for diarrhea, nausea and vomiting. Objective         Vitals Last 24 Hours:  TEMPERATURE:  Temp  Av.2 °F (36.8 °C)  Min: 97.8 °F (36.6 °C)  Max: 98.9 °F (37.2 °C)  RESPIRATIONS RANGE: Resp  Av  Min: 14  Max: 20  PULSE OXIMETRY RANGE: SpO2  Av %  Min: 92 %  Max: 96 %  PULSE RANGE: Pulse  Av.8  Min: 71  Max: 79  BLOOD PRESSURE RANGE: Systolic (59FBF), GWW:473 , Min:117 , HCJ:435   ; Diastolic (22DEF), YUS:93, Min:58, Max:70    I/O (24Hr): Intake/Output Summary (Last 24 hours) at 10/11/2020 0517  Last data filed at 10/10/2020 2254  Gross per 24 hour   Intake 720 ml   Output --   Net 720 ml     Objective:  General Appearance:  Comfortable, well-appearing and in no acute distress. Vital signs: (most recent): Blood pressure (!) 164/65, pulse 66, temperature 98.6 °F (37 °C), temperature source Infrared, resp. rate 16, height 5' 3\" (1.6 m), weight 157 lb 6.4 oz (71.4 kg), SpO2 95 %. Lungs:  Normal effort and normal respiratory rate. Breath sounds clear to auscultation. No rales or rhonchi. Heart: Normal rate. Regular rhythm. S1 normal and S2 normal.  No friction rub. Abdomen: Abdomen is soft and non-distended. Bowel sounds are normal.   There is no abdominal tenderness. There is no rebound tenderness. There is no guarding. Extremities:  There is no dependent edema.    Skin:  Warm. Labs/Imaging/Diagnostics    Labs:  CBC:  Recent Labs     10/08/20  2350 10/09/20  0521   WBC 11.4 8.4   RBC 5.28 4.67   HGB 16.1* 14.0   HCT 48.1* 42.0   MCV 91.1 89.9   RDW 12.7 12.7    242     CHEMISTRIES:  Recent Labs     10/08/20  2350 10/09/20  0521 10/10/20  0638    142 141   K 3.6 3.5 3.3*   CL 98 99 100   CO2 25 28 27   BUN 29* 26* 29*   CREATININE 1.0 1.0 1.2*   GLUCOSE 177* 144* 99   MG  --  1.2*  --      PT/INR:No results for input(s): PROTIME, INR in the last 72 hours. APTT:No results for input(s): APTT in the last 72 hours. LIVER PROFILE:  Recent Labs     10/08/20  2350 10/09/20  0521   AST 21 24   ALT 22 19   BILITOT 0.4 0.3   ALKPHOS 132* 114*       Imaging Last 24 Hours:  Ct Chest Wo Contrast    Result Date: 10/10/2020  EXAMINATION: CT OF THE CHEST WITHOUT CONTRAST 10/10/2020 10:03 am TECHNIQUE: CT of the chest was performed without the administration of intravenous contrast. Multiplanar reformatted images are provided for review. Dose modulation, iterative reconstruction, and/or weight based adjustment of the mA/kV was utilized to reduce the radiation dose to as low as reasonably achievable. COMPARISON: 06/06/2020 HISTORY: The current history is not provided to radiology through [address] [place] [address] Rd. ORDERING SYSTEM PROVIDED HISTORY: pre op CABG TECHNOLOGIST PROVIDED HISTORY: Reason for exam:->pre op CABG What reading provider will be dictating this exam?->[facility] FINDINGS: Mediastinum: The thoracic aorta is normal caliber. There is no aneurysm. There is significant calcified plaque seen within the coronary arteries. There is no pericardial effusion. The central airways are clear. There is no mediastinal or hilar lymphadenopathy. Lungs/pleura: There is hyperinflation of the lungs. There is a vague ground-glass approximately 1.7 x 1.5 cm opacity within the right upper lobe posteriorly best appreciated on axial image number 41 series 3.   Differential includes scarring, atelectasis or residual inflammatory process in a patient who previously demonstrated small ground-glass nodular infiltrates. There are no findings to suggest pneumonia. There is no pleural effusion or pleural thickening. There is chronic elevation of the right hemidiaphragm. Upper Abdomen:  Limited images of the upper abdomen demonstrate no acute abnormality. The gallbladder is surgically absent. Soft Tissues/Bones:  Age related degenerative changes of the visualized osseous structures without focal destructive lesion. 1. 1.5 x 1.7 cm vague ground-glass opacity seen within the right upper lobe of indeterminate etiology. This could represent postinflammatory scarring, residual inflammatory process in a patient with previously identified multifocal bilateral ground-glass nodular infiltrates. Atelectasis is less likely. 2. There are no gross findings of pneumonia and there is no pleural effusion. 3. Chronic significant elevation of the right hemidiaphragm. Us Dup Lower Extremity Mapping Bilat Venous    Result Date: 10/10/2020  Patient MRN:  [id number] : 1935 Age: 80 years Gender: Female Order Date:  10/10/2020 4:57 PM EXAM: US DUP LOWER EXTREMITY MAPPING BILAT VENOUS NUMBER OF IMAGES:  16 INDICATION:  pre op CABG pre op CABG What reading provider will be dictating this exam?->[facility]  Preop coronary bypass. COMPARISON: None Bilateral Lower extremity venous mapping Right and left lower extremity venous mapping was performed. Measurements on the the right: Groin: 4.7  mm, High thigh : 3.3 mm, Mid thigh : 3.9 mm, Low thigh : 4 mm, Knee : 2.7 mm, High lower leg : 1.7 mm, Mid lower leg : 1.6 Ankle : 1. mm, Measurements on the left; Groin : 4.2 mm, High thigh : 3.5 mm, Mid thigh : 3 mm, Low thigh : 3.3 mm, Knee : 3.1 mm, High lower leg : 2.3 mm, Mid lower leg : 1.8 mm, Ankle : 1. mm,     Bilateral venous mapping as described. No deep venous thrombosis visualized.      Us Carotid Artery Bilateral    Result Date: 10/10/2020  Patient MRN:  [id number] : 1935 Age: 80 years Gender: Female Order Date:  10/10/2020 4:57 PM EXAM: US CAROTID ARTERY BILATERAL NUMBER OF IMAGES:  55 INDICATION:  pre op CABG pre op CABG What reading provider will be dictating this exam?->[facility] COMPARISON: None FINDINGS: Velocities are within normal limits ICA\CCA ratios are  within normal limits The right vertebral artery doppler images demonstrate  antegrade flow. The left vertebral artery doppler images demonstrate  antegrade flow. Grey scale images demonstrate mild plaque identified in the right and left carotid arteries. Atherosclerotic disease. No hemodynamically significant stenosis is identified Estimated stenosis by NASCET criteria in the proximal right carotid artery is between 0% and 49%. Estimated stenosis by NASCET criteria in the proximal left carotid artery is between 0% and 49%. Assessment//Plan           Hospital Problems           Last Modified POA    Atrial fibrillation with rapid ventricular response ([address] 10/9/2020 Yes    Right bundle branch block 10/9/2020 Yes    Non-STEMI (non-ST elevated myocardial infarction) ([address] 10/9/2020 Yes    Essential hypertension, benign 10/10/2020 Yes        Assessment & Plan  1. NSTEMI  2. Paroxysmal Afib  3. Diabetes Mellitus Type II  4. Hypertension  5. Tobacco Abuse   6. Lumbar spinal stenosis    Appreciate cardiothoracic input. Await AM labs. Continue on IVF. Add norco for back pain for now.      Twyla Feldman DO    Electronically signed by wTyla Feldman DO on 10/11/20 at 5:17 AM EDT

## 2020-10-11 NOTE — PROGRESS NOTES
Pt and pt daughter updated on plan to transfer to [address] [place] [address]. All questions answered at this time. Pt and family satisfied with plan. Order placed to copy chart for transfer.

## 2020-10-11 NOTE — PROGRESS NOTES
Narrative    Not on file     History reviewed. No pertinent family history. Vitals:    10/10/20 2000 10/10/20 2320 10/11/20 0702 10/11/20 0800   BP: 123/60 (!) 143/70 (!) 164/65 132/66   Pulse: 78 71 66 78   Resp: 14 20 16 16   Temp: 97.8 °F (36.6 °C) 98 °F (36.7 °C) 98.6 °F (37 °C) 97.4 °F (36.3 °C)   TempSrc: Temporal Temporal Infrared Temporal   SpO2: 92% 92% 95% 94%   Weight:       Height:             Intake/Output Summary (Last 24 hours) at 10/11/2020 0948  Last data filed at 10/11/2020 0651  Gross per 24 hour   Intake 1105 ml   Output --   Net 1105 ml       Recent Labs     10/08/20  2350 10/09/20  0521 10/11/20  0836   WBC 11.4 8.4 6.8   HGB 16.1* 14.0 12.6   HCT 48.1* 42.0 38.0    242 216      Recent Labs     10/08/20  2350 10/09/20  0521 10/10/20  0638   BUN 29* 26* 29*   CREATININE 1.0 1.0 1.2*       ROS:   Negative for CP, palpitations, SOB at rest, dizziness/lightheadedness. Physical Exam   Constitutional: Oriented to person, place, and time. Appears well-developed. No distress. CARDIOVASCULAR:  Normal apical impulse, regular rate and rhythm, normal S1 and S2, no S3 or S4, and no murmur noted  Pulmonary/Chest: Effort normal. No respiratory distress. Abdominal: Soft. Bowel sounds are normal.   Musculoskeletal: Normal range of motion. Neurological: alert and oriented to person, place, and time. Skin: Skin is warm and dry. Psychiatric: normal mood and affect. ASSESSMENT: NSTEMI, CAD      PLAN:  I reviewed her CT of her chest and as expected she has massive coronary calcifcations. What is more concerning, however, is the extensive calcification of the anterior wall of her ascending aorta. I do not feel she is best served with CABG. I encouraged her to seek a second opinion. Will sign off. Thank you.   Eduardo Cardoso        Note: 25 minutes was spent providing face-to-face patient care, including:  and coordinating care, reviewing the chart, labs, and diagnostics, as well as medical decision making. Greater than 50% of this time was spent instructing and counseling the patient face to face regarding findings and recommendations.

## 2020-10-12 LAB
ALBUMIN SERPL-MCNC: 3.4 G/DL (ref 3.5–5.2)
ALP BLD-CCNC: 107 U/L (ref 35–104)
ALT SERPL-CCNC: 15 U/L (ref 0–32)
ANION GAP SERPL CALCULATED.3IONS-SCNC: 9 MMOL/L (ref 7–16)
AST SERPL-CCNC: 15 U/L (ref 0–31)
BASOPHILS ABSOLUTE: 0.04 E9/L (ref 0–0.2)
BASOPHILS RELATIVE PERCENT: 0.6 % (ref 0–2)
BILIRUB SERPL-MCNC: 0.2 MG/DL (ref 0–1.2)
BUN BLDV-MCNC: 28 MG/DL (ref 8–23)
CALCIUM SERPL-MCNC: 8.8 MG/DL (ref 8.6–10.2)
CHLORIDE BLD-SCNC: 105 MMOL/L (ref 98–107)
CO2: 28 MMOL/L (ref 22–29)
CREAT SERPL-MCNC: 1.1 MG/DL (ref 0.5–1)
EOSINOPHILS ABSOLUTE: 0.16 E9/L (ref 0.05–0.5)
EOSINOPHILS RELATIVE PERCENT: 2.3 % (ref 0–6)
GFR AFRICAN AMERICAN: 57
GFR NON-AFRICAN AMERICAN: 47 ML/MIN/1.73
GLUCOSE BLD-MCNC: 99 MG/DL (ref 74–99)
HCT VFR BLD CALC: 35.7 % (ref 34–48)
HEMOGLOBIN: 11.7 G/DL (ref 11.5–15.5)
IMMATURE GRANULOCYTES #: 0.04 E9/L
IMMATURE GRANULOCYTES %: 0.6 % (ref 0–5)
LYMPHOCYTES ABSOLUTE: 2.23 E9/L (ref 1.5–4)
LYMPHOCYTES RELATIVE PERCENT: 32.7 % (ref 20–42)
MCH RBC QN AUTO: 30.2 PG (ref 26–35)
MCHC RBC AUTO-ENTMCNC: 32.8 % (ref 32–34.5)
MCV RBC AUTO: 92 FL (ref 80–99.9)
METER GLUCOSE: 124 MG/DL (ref 74–99)
METER GLUCOSE: 138 MG/DL (ref 74–99)
MONOCYTES ABSOLUTE: 0.6 E9/L (ref 0.1–0.95)
MONOCYTES RELATIVE PERCENT: 8.8 % (ref 2–12)
NEUTROPHILS ABSOLUTE: 3.76 E9/L (ref 1.8–7.3)
NEUTROPHILS RELATIVE PERCENT: 55 % (ref 43–80)
PDW BLD-RTO: 12.9 FL (ref 11.5–15)
PLATELET # BLD: 205 E9/L (ref 130–450)
PMV BLD AUTO: 10.8 FL (ref 7–12)
POTASSIUM SERPL-SCNC: 3.5 MMOL/L (ref 3.5–5)
RBC # BLD: 3.88 E12/L (ref 3.5–5.5)
SODIUM BLD-SCNC: 142 MMOL/L (ref 132–146)
TOTAL PROTEIN: 5.7 G/DL (ref 6.4–8.3)
WBC # BLD: 6.8 E9/L (ref 4.5–11.5)

## 2020-10-12 PROCEDURE — 6370000000 HC RX 637 (ALT 250 FOR IP): Performed by: INTERNAL MEDICINE

## 2020-10-12 PROCEDURE — 82962 GLUCOSE BLOOD TEST: CPT

## 2020-10-12 PROCEDURE — 36415 COLL VENOUS BLD VENIPUNCTURE: CPT

## 2020-10-12 PROCEDURE — 6360000002 HC RX W HCPCS: Performed by: INTERNAL MEDICINE

## 2020-10-12 PROCEDURE — 6370000000 HC RX 637 (ALT 250 FOR IP): Performed by: FAMILY MEDICINE

## 2020-10-12 PROCEDURE — 2580000003 HC RX 258: Performed by: INTERNAL MEDICINE

## 2020-10-12 PROCEDURE — 85025 COMPLETE CBC W/AUTO DIFF WBC: CPT

## 2020-10-12 PROCEDURE — 2140000000 HC CCU INTERMEDIATE R&B

## 2020-10-12 PROCEDURE — 80053 COMPREHEN METABOLIC PANEL: CPT

## 2020-10-12 RX ORDER — LANOLIN ALCOHOL/MO/W.PET/CERES
3 CREAM (GRAM) TOPICAL NIGHTLY PRN
Status: DISCONTINUED | OUTPATIENT
Start: 2020-10-12 | End: 2020-10-14 | Stop reason: HOSPADM

## 2020-10-12 RX ADMIN — SODIUM CHLORIDE, PRESERVATIVE FREE 10 ML: 5 INJECTION INTRAVENOUS at 09:32

## 2020-10-12 RX ADMIN — HYDROCODONE BITARTRATE AND ACETAMINOPHEN 1 TABLET: 5; 325 TABLET ORAL at 21:57

## 2020-10-12 RX ADMIN — ENOXAPARIN SODIUM 70 MG: 80 INJECTION SUBCUTANEOUS at 09:31

## 2020-10-12 RX ADMIN — Medication 3 MG: at 21:54

## 2020-10-12 RX ADMIN — SODIUM CHLORIDE, PRESERVATIVE FREE 10 ML: 5 INJECTION INTRAVENOUS at 21:54

## 2020-10-12 RX ADMIN — POTASSIUM CHLORIDE 40 MEQ: 1500 TABLET, EXTENDED RELEASE ORAL at 22:05

## 2020-10-12 RX ADMIN — PANTOPRAZOLE SODIUM 40 MG: 40 TABLET, DELAYED RELEASE ORAL at 09:31

## 2020-10-12 RX ADMIN — ASPIRIN 81 MG CHEWABLE TABLET 81 MG: 81 TABLET CHEWABLE at 09:31

## 2020-10-12 RX ADMIN — METOPROLOL SUCCINATE 50 MG: 50 TABLET, EXTENDED RELEASE ORAL at 09:31

## 2020-10-12 RX ADMIN — ENOXAPARIN SODIUM 70 MG: 80 INJECTION SUBCUTANEOUS at 21:54

## 2020-10-12 RX ADMIN — LOSARTAN POTASSIUM 100 MG: 50 TABLET, FILM COATED ORAL at 09:31

## 2020-10-12 RX ADMIN — EZETIMIBE 10 MG: 10 TABLET ORAL at 21:54

## 2020-10-12 RX ADMIN — HYDROCODONE BITARTRATE AND ACETAMINOPHEN 1 TABLET: 5; 325 TABLET ORAL at 09:31

## 2020-10-12 RX ADMIN — METFORMIN HYDROCHLORIDE 500 MG: 500 TABLET ORAL at 16:28

## 2020-10-12 RX ADMIN — ISOSORBIDE MONONITRATE 90 MG: 60 TABLET ORAL at 09:31

## 2020-10-12 RX ADMIN — METOPROLOL SUCCINATE 50 MG: 50 TABLET, EXTENDED RELEASE ORAL at 22:09

## 2020-10-12 ASSESSMENT — PAIN SCALES - GENERAL
PAINLEVEL_OUTOF10: 0
PAINLEVEL_OUTOF10: 7
PAINLEVEL_OUTOF10: 8

## 2020-10-12 NOTE — PLAN OF CARE
Problem: Infection:  Goal: Will remain free from infection  Description: Will remain free from infection  Outcome: Met This Shift     Problem: Safety:  Goal: Free from accidental physical injury  Description: Free from accidental physical injury  Outcome: Met This Shift  Goal: Free from intentional harm  Description: Free from intentional harm  Outcome: Met This Shift     Problem: Pain:  Goal: Patient's pain/discomfort is manageable  Description: Patient's pain/discomfort is manageable  Outcome: Met This Shift     Problem: Falls - Risk of:  Goal: Will remain free from falls  Description: Will remain free from falls  Outcome: Met This Shift

## 2020-10-12 NOTE — PROGRESS NOTES
injection Inject 0.7 mLs into the skin 2 times daily for 2 days 10/11/20 10/13/20 Yes Khadijah Singer DO   pantoprazole (PROTONIX) 40 MG tablet Take 1 tablet by mouth every morning (before breakfast) 10/12/20  Yes Khadijah Singer DO   ezetimibe (ZETIA) 10 MG tablet Take 1 tablet by mouth nightly 10/11/20  Yes Khadijah Singer DO   fluticasone (FLONASE) 50 MCG/ACT nasal spray 2 sprays by Nasal route daily 2 sprays in each nostril daily 8/18/20  Yes Franki Ontiveros DO   losartan (COZAAR) 100 MG tablet Take 100 mg by mouth daily   Yes Historical Provider, MD       Current Medications:  Current Facility-Administered Medications   Medication Dose Route Frequency Provider Last Rate Last Dose    HYDROcodone-acetaminophen (NORCO) 5-325 MG per tablet 1 tablet  1 tablet Oral Q6H PRN Khadijah Singer DO   1 tablet at 10/12/20 0931    metoprolol succinate (TOPROL XL) extended release tablet 50 mg  50 mg Oral BID Wilbern Cooks, MD   50 mg at 10/12/20 0931    isosorbide mononitrate (IMDUR) extended release tablet 90 mg  90 mg Oral Daily Wilbern Cooks, MD   90 mg at 10/12/20 0931    enoxaparin (LOVENOX) injection 70 mg  1 mg/kg Subcutaneous BID Wilbern Cooks, MD   70 mg at 10/12/20 0931    dilTIAZem 100 mg in dextrose 5 % 100 mL infusion ([facility])  5 mg/hr Intravenous Continuous Bette Mahmood MD   Stopped at 10/09/20 1613    sodium chloride flush 0.9 % injection 10 mL  10 mL Intravenous 2 times per day Bette Mahmood MD   10 mL at 10/12/20 0932    sodium chloride flush 0.9 % injection 10 mL  10 mL Intravenous PRN Bette Mahmood MD        potassium chloride (KLOR-CON M) extended release tablet 40 mEq  40 mEq Oral PRN Bette Mahmood MD        Or    potassium bicarb-citric acid (EFFER-K) effervescent tablet 40 mEq  40 mEq Oral PRN Bette Mahmood MD        Or    potassium chloride 10 mEq/100 mL IVPB (Peripheral Line)  10 mEq Intravenous PRN Bette Mahmood MD        acetaminophen (TYLENOL) tablet 650 mg  650 mg Oral Q6H PRN Moriah Acuña MD   650 mg at 10/10/20 2151    Or    acetaminophen (TYLENOL) suppository 650 mg  650 mg Rectal Q6H PRN Moriah Acuña MD        senna (SENOKOT) tablet 8.6 mg  1 tablet Oral Daily PRN Moriah Acuña MD        promethazine (PHENERGAN) tablet 12.5 mg  12.5 mg Oral Q6H PRN Moriah Acuña MD        Or    [facility]dansetron [facility]) injection 4 mg  4 mg Intravenous Q6H PRN Moriah Acuña MD        ezetimibe (ZETIA) tablet 10 mg  10 mg Oral Nightly Moriah Acuña MD   10 mg at 10/11/20 2103    insulin lispro (HUMALOG) injection vial 0-6 Units  0-6 Units Subcutaneous TID WC Moriah Acuña MD   1 Units at 10/11/20 1657    insulin lispro (HUMALOG) injection vial 0-3 Units  0-3 Units Subcutaneous Nightly Moriah Acuña MD   1 Units at 10/11/20 2103    glucose (GLUTOSE) 40 % oral gel 15 g  15 g Oral PRN Moriah Acuña MD        dextrose 50 % IV solution  12.5 g Intravenous PRN Moriah Acuña MD        glucagon (rDNA) injection 1 mg  1 mg Intramuscular PRN Moriah Acuña MD        dextrose 5 % solution  100 mL/hr Intravenous PRN Moriah Acuña MD        pantoprazole (PROTONIX) tablet 40 mg  40 mg Oral QAM AC Moriah Acuña MD   40 mg at 10/12/20 0931    influenza quadrivalent split vaccine (FLUZONE;FLUARIX;FLULAVAL;AFLURIA) injection 0.5 mL  0.5 mL Intramuscular Prior to discharge Moriah Acuña MD        calcium carbonate (TUMS) chewable tablet 1,000 mg  1,000 mg Oral TID PRN Moriah Acuña MD   1,000 mg at 10/09/20 0534    aspirin chewable tablet 81 mg  81 mg Oral Daily Moriah Acuña MD   81 mg at 10/12/20 0931    0.9 % sodium chloride infusion   Intravenous Continuous Moriah Acuña MD 20 mL/hr at 10/09/20 1343      0.9 % sodium chloride infusion   Intravenous Continuous Moriah Acuña MD 20 mL/hr at 10/09/20 1343      losartan (COZAAR) tablet 100 mg  100 mg Oral Daily Yahir Morelos MD   100 mg at 10/12/20 0931      diltiazem (CARDIZEM) 100 mg in dextrose 5% 216 205   MPV 10.6 10.8       Last 3 CMP:    Recent Labs     10/10/20  0638 10/11/20  0836 10/12/20  0459    144 142   K 3.3* 3.6 3.5    103 105   CO2 27 27 28   BUN 29* 28* 28*   CREATININE 1.2* 1.1* 1.1*   GLUCOSE 99 113* 99   CALCIUM 9.5 9.0 8.8   PROT  --  6.2* 5.7*   LABALBU  --  3.6 3.4*   BILITOT  --  0.4 0.2   ALKPHOS  --  118* 107*   AST  --  18 15   ALT  --  18 15       Last 3 Mag/Phos:  No results for input(s): MG, PHOS in the last 72 hours. Last 3 CK, CKMB, Troponin  Recent Labs     10/09/20  1048   TROPONINI 0.44*       Last 3 Pro-BNP:  No results for input(s): PROBNP in the last 72 hours. Lab Results   Component Value Date    PROBNP 2,310 (H) 10/09/2020       Last 3 Glucose:     Recent Labs     10/10/20  0638 10/11/20  0836 10/12/20  0459   GLUCOSE 99 113* 99       Last 3 Coags:  No results for input(s): PROTIME, INR, PTT in the last 72 hours. No results found for: PROTIME, INR, PTT    Last 3 Lipid Panel:  Recent Labs     10/09/20  1048   LDLCALC 84   HDL 54   TRIG 191*     Lab Results   Component Value Date    LDLCALC 84 10/09/2020     Lab Results   Component Value Date    HDL 54 10/09/2020     Lab Results   Component Value Date    TRIG 191 (H) 10/09/2020     No components found for: CHLPL    TSH:  No results for input(s): TSH in the last 72 hours. Lab Results   Component Value Date    TSH 1.730 10/09/2020       ABGs:  No results for input(s): PH, PO2, PCO2, HCO3, BE, O2SAT in the last 72 hours. Lactic Acid:  No results for input(s): LACTA in the last 72 hours. Radiology:  RAD Results:  US DUP LOWER EXTREMITY MAPPING BILAT VENOUS   Final Result   Bilateral venous mapping as described. No deep venous   thrombosis visualized. US CAROTID ARTERY BILATERAL   Final Result   Atherosclerotic disease. No hemodynamically significant stenosis is   identified   Estimated stenosis by NASCET criteria in the proximal right carotid   artery is between 0% and 49%.    Estimated stenosis by NASCET criteria in the proximal left carotid   artery is between 0% and 49%. CT CHEST WO CONTRAST   Final Result   1. 1.5 x 1.7 cm vague ground-glass opacity seen within the right upper lobe   of indeterminate etiology. This could represent postinflammatory scarring,   residual inflammatory process in a patient with previously identified   multifocal bilateral ground-glass nodular infiltrates. Atelectasis is less   likely. 2. There are no gross findings of pneumonia and there is no pleural effusion. 3. Chronic significant elevation of the right hemidiaphragm. XR CHEST PORTABLE   Final Result   No acute process. VL ADA BILATERAL LIMITED 1-2 LEVELS    (Results Pending)         EKG and Telemetry:  12-lead EKG personally reviewed and shows #1 atrial fibrillation 123, LAD, RBBB, lateral ST depression  #2 NSR LAD RBBB lateral ST depression    Telemetry personally reviewed and shows sinus rhythm        ASSESSMENT / PLAN:    1. NSTEMI- Multiple risk factors age, DM, smoker family history, This may be provoking afib or the afib may be provoking angina. Multivessel CAD, going to [facility] for opinion once bed available. 2 PAF unclear provoking angina or provoked by angina. Back in sinus at present. [id number]-EXVh score 6 so would recommend [address] once CAD issues addressed. Just had echo in March- LA reported normal size and no significant valvular disease. On metoprolol  3 RBBB chronic  4 Smoker- needs to stop  5 DM- aim for A1C < 7, if was on metformin- okay to resume as before  6 GERD- may or may not be contributing to her symptoms- can cont PPI. Ok for flu shot          Thank you for consultation.     Karma Segura MD, [facility]  The [address] at [facility]    Electronically signed by Karma Segura MD on 10/12/2020 at 9:48 AM

## 2020-10-13 LAB
ALBUMIN SERPL-MCNC: 3.3 G/DL (ref 3.5–5.2)
ALP BLD-CCNC: 103 U/L (ref 35–104)
ALT SERPL-CCNC: 16 U/L (ref 0–32)
ANION GAP SERPL CALCULATED.3IONS-SCNC: 11 MMOL/L (ref 7–16)
AST SERPL-CCNC: 16 U/L (ref 0–31)
BASOPHILS ABSOLUTE: 0.04 E9/L (ref 0–0.2)
BASOPHILS RELATIVE PERCENT: 0.5 % (ref 0–2)
BILIRUB SERPL-MCNC: <0.2 MG/DL (ref 0–1.2)
BUN BLDV-MCNC: 27 MG/DL (ref 8–23)
CALCIUM SERPL-MCNC: 9 MG/DL (ref 8.6–10.2)
CHLORIDE BLD-SCNC: 104 MMOL/L (ref 98–107)
CO2: 26 MMOL/L (ref 22–29)
CREAT SERPL-MCNC: 1.1 MG/DL (ref 0.5–1)
EKG ATRIAL RATE: 65 BPM
EKG P AXIS: 46 DEGREES
EKG P-R INTERVAL: 156 MS
EKG Q-T INTERVAL: 434 MS
EKG QRS DURATION: 82 MS
EKG QTC CALCULATION (BAZETT): 451 MS
EKG R AXIS: -27 DEGREES
EKG T AXIS: 120 DEGREES
EKG VENTRICULAR RATE: 65 BPM
EOSINOPHILS ABSOLUTE: 0.17 E9/L (ref 0.05–0.5)
EOSINOPHILS RELATIVE PERCENT: 2.3 % (ref 0–6)
GFR AFRICAN AMERICAN: 57
GFR NON-AFRICAN AMERICAN: 47 ML/MIN/1.73
GLUCOSE BLD-MCNC: 102 MG/DL (ref 74–99)
HCT VFR BLD CALC: 36.4 % (ref 34–48)
HEMOGLOBIN: 11.6 G/DL (ref 11.5–15.5)
IMMATURE GRANULOCYTES #: 0.03 E9/L
IMMATURE GRANULOCYTES %: 0.4 % (ref 0–5)
LYMPHOCYTES ABSOLUTE: 2.3 E9/L (ref 1.5–4)
LYMPHOCYTES RELATIVE PERCENT: 31.3 % (ref 20–42)
MCH RBC QN AUTO: 29.9 PG (ref 26–35)
MCHC RBC AUTO-ENTMCNC: 31.9 % (ref 32–34.5)
MCV RBC AUTO: 93.8 FL (ref 80–99.9)
METER GLUCOSE: 94 MG/DL (ref 74–99)
MONOCYTES ABSOLUTE: 0.7 E9/L (ref 0.1–0.95)
MONOCYTES RELATIVE PERCENT: 9.5 % (ref 2–12)
NEUTROPHILS ABSOLUTE: 4.1 E9/L (ref 1.8–7.3)
NEUTROPHILS RELATIVE PERCENT: 56 % (ref 43–80)
PDW BLD-RTO: 12.9 FL (ref 11.5–15)
PLATELET # BLD: 196 E9/L (ref 130–450)
PMV BLD AUTO: 11.5 FL (ref 7–12)
POTASSIUM SERPL-SCNC: 4.2 MMOL/L (ref 3.5–5)
RBC # BLD: 3.88 E12/L (ref 3.5–5.5)
SODIUM BLD-SCNC: 141 MMOL/L (ref 132–146)
TOTAL PROTEIN: 5.9 G/DL (ref 6.4–8.3)
WBC # BLD: 7.3 E9/L (ref 4.5–11.5)

## 2020-10-13 PROCEDURE — 6360000002 HC RX W HCPCS: Performed by: INTERNAL MEDICINE

## 2020-10-13 PROCEDURE — 2580000003 HC RX 258: Performed by: INTERNAL MEDICINE

## 2020-10-13 PROCEDURE — 6370000000 HC RX 637 (ALT 250 FOR IP): Performed by: INTERNAL MEDICINE

## 2020-10-13 PROCEDURE — 85025 COMPLETE CBC W/AUTO DIFF WBC: CPT

## 2020-10-13 PROCEDURE — 2140000000 HC CCU INTERMEDIATE R&B

## 2020-10-13 PROCEDURE — 36415 COLL VENOUS BLD VENIPUNCTURE: CPT

## 2020-10-13 PROCEDURE — 80053 COMPREHEN METABOLIC PANEL: CPT

## 2020-10-13 PROCEDURE — 6370000000 HC RX 637 (ALT 250 FOR IP): Performed by: FAMILY MEDICINE

## 2020-10-13 PROCEDURE — 82962 GLUCOSE BLOOD TEST: CPT

## 2020-10-13 PROCEDURE — 93010 ELECTROCARDIOGRAM REPORT: CPT | Performed by: INTERNAL MEDICINE

## 2020-10-13 PROCEDURE — 93005 ELECTROCARDIOGRAM TRACING: CPT | Performed by: FAMILY MEDICINE

## 2020-10-13 RX ORDER — NITROGLYCERIN 0.4 MG/1
0.4 TABLET SUBLINGUAL EVERY 5 MIN PRN
Status: DISCONTINUED | OUTPATIENT
Start: 2020-10-13 | End: 2020-10-14 | Stop reason: HOSPADM

## 2020-10-13 RX ADMIN — ASPIRIN 81 MG CHEWABLE TABLET 81 MG: 81 TABLET CHEWABLE at 08:50

## 2020-10-13 RX ADMIN — EZETIMIBE 10 MG: 10 TABLET ORAL at 20:24

## 2020-10-13 RX ADMIN — SODIUM CHLORIDE, PRESERVATIVE FREE 10 ML: 5 INJECTION INTRAVENOUS at 20:24

## 2020-10-13 RX ADMIN — HYDROCODONE BITARTRATE AND ACETAMINOPHEN 1 TABLET: 5; 325 TABLET ORAL at 08:53

## 2020-10-13 RX ADMIN — ENOXAPARIN SODIUM 70 MG: 80 INJECTION SUBCUTANEOUS at 20:25

## 2020-10-13 RX ADMIN — ACETAMINOPHEN 650 MG: 325 TABLET, FILM COATED ORAL at 02:50

## 2020-10-13 RX ADMIN — NITROGLYCERIN 0.4 MG: 0.4 TABLET, ORALLY DISINTEGRATING SUBLINGUAL at 08:07

## 2020-10-13 RX ADMIN — METOPROLOL SUCCINATE 50 MG: 50 TABLET, EXTENDED RELEASE ORAL at 08:50

## 2020-10-13 RX ADMIN — METOPROLOL SUCCINATE 50 MG: 50 TABLET, EXTENDED RELEASE ORAL at 20:23

## 2020-10-13 RX ADMIN — METFORMIN HYDROCHLORIDE 500 MG: 500 TABLET ORAL at 08:05

## 2020-10-13 RX ADMIN — METFORMIN HYDROCHLORIDE 500 MG: 500 TABLET ORAL at 17:56

## 2020-10-13 RX ADMIN — NITROGLYCERIN 0.4 MG: 0.4 TABLET, ORALLY DISINTEGRATING SUBLINGUAL at 02:51

## 2020-10-13 RX ADMIN — LOSARTAN POTASSIUM 100 MG: 50 TABLET, FILM COATED ORAL at 08:50

## 2020-10-13 RX ADMIN — PANTOPRAZOLE SODIUM 40 MG: 40 TABLET, DELAYED RELEASE ORAL at 07:01

## 2020-10-13 RX ADMIN — ENOXAPARIN SODIUM 70 MG: 80 INJECTION SUBCUTANEOUS at 08:50

## 2020-10-13 RX ADMIN — ISOSORBIDE MONONITRATE 90 MG: 60 TABLET ORAL at 08:50

## 2020-10-13 RX ADMIN — SODIUM CHLORIDE, PRESERVATIVE FREE 10 ML: 5 INJECTION INTRAVENOUS at 08:51

## 2020-10-13 ASSESSMENT — PAIN DESCRIPTION - PAIN TYPE: TYPE: ACUTE PAIN

## 2020-10-13 ASSESSMENT — PAIN SCALES - GENERAL
PAINLEVEL_OUTOF10: 7
PAINLEVEL_OUTOF10: 5
PAINLEVEL_OUTOF10: 2
PAINLEVEL_OUTOF10: 7
PAINLEVEL_OUTOF10: 3

## 2020-10-13 ASSESSMENT — PAIN DESCRIPTION - ONSET: ONSET: GRADUAL

## 2020-10-13 ASSESSMENT — PAIN DESCRIPTION - ORIENTATION: ORIENTATION: RIGHT;LEFT;MID

## 2020-10-13 ASSESSMENT — ENCOUNTER SYMPTOMS
SHORTNESS OF BREATH: 0
ABDOMINAL PAIN: 0

## 2020-10-13 ASSESSMENT — PAIN DESCRIPTION - LOCATION: LOCATION: CHEST

## 2020-10-13 ASSESSMENT — PAIN DESCRIPTION - DESCRIPTORS: DESCRIPTORS: PRESSURE;CONSTANT

## 2020-10-13 ASSESSMENT — PAIN DESCRIPTION - PROGRESSION: CLINICAL_PROGRESSION: NOT CHANGED

## 2020-10-13 ASSESSMENT — PAIN DESCRIPTION - FREQUENCY: FREQUENCY: INTERMITTENT

## 2020-10-13 NOTE — PROGRESS NOTES
36.4   MCV 90.7 92.0 93.8   RDW 12.8 12.9 12.9    205 196     CHEMISTRIES:  Recent Labs     10/11/20  0836 10/12/20  0459 10/13/20  0426    142 141   K 3.6 3.5 4.2    105 104   CO2 27 28 26   BUN 28* 28* 27*   CREATININE 1.1* 1.1* 1.1*   GLUCOSE 113* 99 102*     PT/INR:No results for input(s): PROTIME, INR in the last 72 hours. APTT:No results for input(s): APTT in the last 72 hours. LIVER PROFILE:  Recent Labs     10/11/20  0836 10/12/20  0459 10/13/20  0426   AST 18 15 16   ALT 18 15 16   BILITOT 0.4 0.2 <0.2   ALKPHOS 118* 107* 103       Imaging Last 24 Hours:  No results found. Assessment//Plan           Hospital Problems           Last Modified POA    Right bundle branch block 10/11/2020 Yes    Non-STEMI (non-ST elevated myocardial infarction) ([address] 10/11/2020 Yes    Essential hypertension, benign 10/11/2020 Yes        Assessment:  (1. NSTEMI  2. Paroxysmal Afib  3. Diabetes Mellitus Type II  4. Hypertension  5. Tobacco Abuse   6. Lumbar spinal stenosis). Plan:   (Await transfer to  for second opinion regarding CABG. ).        Electronically signed by Donna Woodall MD on 10/13/20 at 7:13 AM EDT

## 2020-10-14 VITALS
WEIGHT: 160.6 LBS | RESPIRATION RATE: 16 BRPM | TEMPERATURE: 97 F | HEART RATE: 64 BPM | BODY MASS INDEX: 28.46 KG/M2 | OXYGEN SATURATION: 96 % | SYSTOLIC BLOOD PRESSURE: 162 MMHG | HEIGHT: 63 IN | DIASTOLIC BLOOD PRESSURE: 80 MMHG

## 2020-10-14 LAB
ALBUMIN SERPL-MCNC: 3.5 G/DL (ref 3.5–5.2)
ALP BLD-CCNC: 114 U/L (ref 35–104)
ALT SERPL-CCNC: 19 U/L (ref 0–32)
ANION GAP SERPL CALCULATED.3IONS-SCNC: 12 MMOL/L (ref 7–16)
AST SERPL-CCNC: 18 U/L (ref 0–31)
BASOPHILS ABSOLUTE: 0.05 E9/L (ref 0–0.2)
BASOPHILS RELATIVE PERCENT: 0.7 % (ref 0–2)
BILIRUB SERPL-MCNC: 0.2 MG/DL (ref 0–1.2)
BUN BLDV-MCNC: 23 MG/DL (ref 8–23)
CALCIUM SERPL-MCNC: 9.4 MG/DL (ref 8.6–10.2)
CHLORIDE BLD-SCNC: 105 MMOL/L (ref 98–107)
CO2: 26 MMOL/L (ref 22–29)
CREAT SERPL-MCNC: 1 MG/DL (ref 0.5–1)
EOSINOPHILS ABSOLUTE: 0.12 E9/L (ref 0.05–0.5)
EOSINOPHILS RELATIVE PERCENT: 1.6 % (ref 0–6)
GFR AFRICAN AMERICAN: >60
GFR NON-AFRICAN AMERICAN: 53 ML/MIN/1.73
GLUCOSE BLD-MCNC: 101 MG/DL (ref 74–99)
HCT VFR BLD CALC: 37.9 % (ref 34–48)
HEMOGLOBIN: 12.4 G/DL (ref 11.5–15.5)
IMMATURE GRANULOCYTES #: 0.04 E9/L
IMMATURE GRANULOCYTES %: 0.5 % (ref 0–5)
LYMPHOCYTES ABSOLUTE: 1.63 E9/L (ref 1.5–4)
LYMPHOCYTES RELATIVE PERCENT: 22.3 % (ref 20–42)
MCH RBC QN AUTO: 30.9 PG (ref 26–35)
MCHC RBC AUTO-ENTMCNC: 32.7 % (ref 32–34.5)
MCV RBC AUTO: 94.5 FL (ref 80–99.9)
MONOCYTES ABSOLUTE: 0.59 E9/L (ref 0.1–0.95)
MONOCYTES RELATIVE PERCENT: 8.1 % (ref 2–12)
NEUTROPHILS ABSOLUTE: 4.87 E9/L (ref 1.8–7.3)
NEUTROPHILS RELATIVE PERCENT: 66.8 % (ref 43–80)
PDW BLD-RTO: 13 FL (ref 11.5–15)
PLATELET # BLD: 216 E9/L (ref 130–450)
PMV BLD AUTO: 11.9 FL (ref 7–12)
POTASSIUM SERPL-SCNC: 4.2 MMOL/L (ref 3.5–5)
RBC # BLD: 4.01 E12/L (ref 3.5–5.5)
SODIUM BLD-SCNC: 143 MMOL/L (ref 132–146)
TOTAL PROTEIN: 6.1 G/DL (ref 6.4–8.3)
WBC # BLD: 7.3 E9/L (ref 4.5–11.5)

## 2020-10-14 PROCEDURE — 80053 COMPREHEN METABOLIC PANEL: CPT

## 2020-10-14 PROCEDURE — 36415 COLL VENOUS BLD VENIPUNCTURE: CPT

## 2020-10-14 PROCEDURE — 6370000000 HC RX 637 (ALT 250 FOR IP): Performed by: INTERNAL MEDICINE

## 2020-10-14 PROCEDURE — 2580000003 HC RX 258: Performed by: INTERNAL MEDICINE

## 2020-10-14 PROCEDURE — 6370000000 HC RX 637 (ALT 250 FOR IP): Performed by: FAMILY MEDICINE

## 2020-10-14 PROCEDURE — 85025 COMPLETE CBC W/AUTO DIFF WBC: CPT

## 2020-10-14 PROCEDURE — 6360000002 HC RX W HCPCS: Performed by: INTERNAL MEDICINE

## 2020-10-14 RX ORDER — HYDRALAZINE HYDROCHLORIDE 20 MG/ML
10 INJECTION INTRAMUSCULAR; INTRAVENOUS EVERY 6 HOURS PRN
Status: DISCONTINUED | OUTPATIENT
Start: 2020-10-14 | End: 2020-10-14 | Stop reason: HOSPADM

## 2020-10-14 RX ORDER — NITROGLYCERIN 0.4 MG/1
TABLET SUBLINGUAL
Qty: 25 TABLET | Refills: 3 | Status: SHIPPED | OUTPATIENT
Start: 2020-10-14 | End: 2021-01-13 | Stop reason: ALTCHOICE

## 2020-10-14 RX ORDER — LANOLIN ALCOHOL/MO/W.PET/CERES
3 CREAM (GRAM) TOPICAL NIGHTLY PRN
Qty: 30 TABLET | Refills: 3 | Status: SHIPPED | OUTPATIENT
Start: 2020-10-14 | End: 2022-05-30

## 2020-10-14 RX ADMIN — PANTOPRAZOLE SODIUM 40 MG: 40 TABLET, DELAYED RELEASE ORAL at 06:07

## 2020-10-14 RX ADMIN — ACETAMINOPHEN 650 MG: 325 TABLET, FILM COATED ORAL at 00:05

## 2020-10-14 RX ADMIN — SODIUM CHLORIDE, PRESERVATIVE FREE 10 ML: 5 INJECTION INTRAVENOUS at 00:44

## 2020-10-14 RX ADMIN — HYDROCODONE BITARTRATE AND ACETAMINOPHEN 1 TABLET: 5; 325 TABLET ORAL at 04:32

## 2020-10-14 RX ADMIN — Medication 3 MG: at 00:43

## 2020-10-14 RX ADMIN — HYDRALAZINE HYDROCHLORIDE 10 MG: 20 INJECTION, SOLUTION INTRAMUSCULAR; INTRAVENOUS at 00:44

## 2020-10-14 ASSESSMENT — PAIN DESCRIPTION - FREQUENCY: FREQUENCY: INTERMITTENT

## 2020-10-14 ASSESSMENT — PAIN DESCRIPTION - ONSET: ONSET: GRADUAL

## 2020-10-14 ASSESSMENT — PAIN DESCRIPTION - LOCATION
LOCATION: GENERALIZED
LOCATION: GENERALIZED

## 2020-10-14 ASSESSMENT — PAIN DESCRIPTION - PAIN TYPE
TYPE: ACUTE PAIN
TYPE: ACUTE PAIN

## 2020-10-14 ASSESSMENT — PAIN SCALES - GENERAL
PAINLEVEL_OUTOF10: 4
PAINLEVEL_OUTOF10: 0
PAINLEVEL_OUTOF10: 5
PAINLEVEL_OUTOF10: 0

## 2020-10-14 ASSESSMENT — PAIN DESCRIPTION - DESCRIPTORS: DESCRIPTORS: CONSTANT;DISCOMFORT;DULL

## 2020-10-14 NOTE — PROGRESS NOTES
Received phone call from [facility] center, they state there is No bed available yet in [place].  Jody Hilliard

## 2020-10-14 NOTE — DISCHARGE SUMMARY
VenousResult Date: 10/10/2020Bilateral venous mapping as described. No deep venous thrombosis visualized. Us Carotid Artery BilateralResult Date: 10/10/2020Atherosclerotic disease. No hemodynamically significant stenosis is identified Estimated stenosis by NASCET criteria in the proximal right carotid artery is between 0% and 49%. Estimated stenosis by NASCET criteria in the proximal left carotid artery is between 0% and 49%. Pending Labs   Order Current Status  CBC Auto Differential In process  Comprehensive Metabolic Panel In process      Discharge Medications    Current Discharge Medication ListSTART taking these medicationspotassium chloride (KLOR-CON M) 20 MEQ extended release tabletTake 2 tablets by mouth as needed (Potassium Replacement)Qty: 60 tablet Refills: 3senna (SENOKOT) 8.6 MG tabletTake 1 tablet by mouth daily as needed (Constipation)Qty: 7 tablet Refills: 0promethazine (PHENERGAN) 12.5 MG tabletTake 1 tablet by mouth every 6 hours as needed for NauseaQty: 28 tablet Refills: 0!! insulin lispro (HUMALOG) 100 UNIT/ML injection vialInject 0-6 Units into the skin 3 times daily (with meals)Qty: 1 vial Refills: 3!! insulin lispro (HUMALOG) 100 UNIT/ML injection vialInject 0-3 Units into the skin nightlyQty: 1 vial Refills: 3glucose (GLUTOSE) 40 % GELTake 37.5 mLs by mouth as needed (hypoglycemia)Qty: 45 g Refills: 0glucagon, rDNA, 1 MG injectionInject 1 mg into the muscle as needed for Low blood sugar (Blood glucose less than 70 mg/dL and patient NOT ALERT or NPO and does not have IV access. )Qty: 1 mg Refills: 0calcium carbonate (TUMS) 500 MG chewable tabletTake 2 tablets by mouth 3 times daily as needed for HeartburnQty: 30 tablet Refills: 0aspirin 81 MG chewable tabletTake 1 tablet by mouth dailyQty: 30 tablet Refills: 3HYDROcodone-acetaminophen (NORCO) 5-325 MG per tabletTake 1 tablet by mouth every 6 hours as needed for Pain for up to 3 days. Qty: 12 tablet Refills: 0Comments: Reduce doses taken as pain becomes manageableAssociated Diagnoses:Spinal stenosis of lumbar region without neurogenic claudicationmetoprolol succinate (TOPROL XL) 50 MG extended release tabletTake 1 tablet by mouth 2 times dailyQty: 30 tablet Refills: 3isosorbide mononitrate (IMDUR) 30 MG extended release tabletTake 3 tablets by mouth dailyQty: 30 tablet Refills: 3enoxaparin (LOVENOX) 80 MG/0.8ML injectionInject 0.7 mLs into the skin 2 times daily for 2 daysQty: 4 Syringe Refills: 0nitroGLYCERIN (NITROSTAT) 0.4 MG SL tabletup to max of 3 total doses. If no relief after 1 dose, call 911. Qty: 25 tablet Refills: 3melatonin 3 MG TABS tabletTake 1 tablet by mouth nightly as needed (insomnia)Qty: 30 tablet Refills: 3!! - Potential duplicate medications found. Please discuss with provider.     Current Discharge Medication ListCONTINUE these medications which have CHANGEDpantoprazole (PROTONIX) 40 MG tabletTake 1 tablet by mouth every morning (before breakfast)Qty: 30 tablet Refills: 3ezetimibe (ZETIA) 10 MG tabletTake 1 tablet by mouth nightlyQty: 30 tablet Refills: 3metFORMIN (GLUCOPHAGE) 500 MG tabletTake 1 tablet by mouth 2 times daily (with meals)Qty: 60 tablet Refills: 3    Current Discharge Medication ListCONTINUE these medications which have NOT CHANGEDfluticasone (FLONASE) 50 MCG/ACT nasal spray2 sprays by Nasal route daily 2 sprays in each nostril dailyQty: 1 Bottle Refills: 3losartan (COZAAR) 100 MG tabletTake 100 mg by mouth daily    Current Discharge Medication ListSTOP taking these medicationsibuprofen (ADVIL;MOTRIN) 400 MG tabletComments:Reason for Stopping:NONFORMULARYComments:Reason for Stopping:sucralfate (CARAFATE) 1 GM tabletComments:Reason for Stopping:hydrochlorothiazide (HYDRODIURIL) 12.5 MG tabletComments:Reason for Stopping:Naproxen Sodium (ALEVE PO)Comments:Reason for Stopping:    Time Spent on Discharge:1E] minutes were spent in patient examination, evaluation, counseling as well as medication reconciliation, prescriptions for required medications, discharge plan, and follow up.     Electronically signed by Kate Gutierrez MD on 10/14/20 at 6:56 AM EDT

## 2020-10-14 NOTE — PROGRESS NOTES
Access center called :   bed obtained [address]. Call  N-N to [phone]. Access center states they will arrange transportation and give a return call with update.  Isadora Vanegas

## 2021-01-04 ENCOUNTER — HOSPITAL ENCOUNTER (EMERGENCY)
Age: 86
Discharge: HOME | End: 2021-01-04
Attending: EMERGENCY MEDICINE
Payer: MEDICARE

## 2021-01-04 ENCOUNTER — APPOINTMENT (OUTPATIENT)
Dept: CT IMAGING | Age: 86
End: 2021-01-04
Payer: MEDICARE

## 2021-01-04 ENCOUNTER — APPOINTMENT (OUTPATIENT)
Dept: ULTRASOUND IMAGING | Age: 86
End: 2021-01-04
Payer: MEDICARE

## 2021-01-04 ENCOUNTER — APPOINTMENT (OUTPATIENT)
Dept: GENERAL RADIOLOGY | Age: 86
End: 2021-01-04
Payer: MEDICARE

## 2021-01-04 VITALS
SYSTOLIC BLOOD PRESSURE: 182 MMHG | OXYGEN SATURATION: 96 % | RESPIRATION RATE: 16 BRPM | DIASTOLIC BLOOD PRESSURE: 94 MMHG | TEMPERATURE: 98 F | HEART RATE: 63 BPM

## 2021-01-04 DIAGNOSIS — R60.9 PERIPHERAL EDEMA: ICD-10-CM
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
## 2021-01-04 DIAGNOSIS — I10 ESSENTIAL HYPERTENSION: ICD-10-CM

## 2021-01-04 LAB
ALBUMIN SERPL-MCNC: 3.9 G/DL (ref 3.5–5.2)
ALP BLD-CCNC: 105 U/L (ref 35–104)
ALT SERPL-CCNC: 11 U/L (ref 0–32)
ANION GAP SERPL CALCULATED.3IONS-SCNC: 9 MMOL/L (ref 7–16)
AST SERPL-CCNC: 13 U/L (ref 0–31)
BASOPHILS ABSOLUTE: 0.05 E9/L (ref 0–0.2)
BASOPHILS RELATIVE PERCENT: 0.8 % (ref 0–2)
BILIRUB SERPL-MCNC: 0.3 MG/DL (ref 0–1.2)
BILIRUBIN URINE: NEGATIVE
BLOOD, URINE: NEGATIVE
BUN BLDV-MCNC: 22 MG/DL (ref 8–23)
CALCIUM SERPL-MCNC: 9.5 MG/DL (ref 8.6–10.2)
CHLORIDE BLD-SCNC: 102 MMOL/L (ref 98–107)
CLARITY: CLEAR
CO2: 29 MMOL/L (ref 22–29)
COLOR: YELLOW
CREAT SERPL-MCNC: 0.9 MG/DL (ref 0.5–1)
EKG ATRIAL RATE: 60 BPM
EKG P AXIS: 34 DEGREES
EKG P-R INTERVAL: 168 MS
EKG Q-T INTERVAL: 472 MS
EKG QRS DURATION: 136 MS
EKG QTC CALCULATION (BAZETT): 472 MS
EKG R AXIS: -20 DEGREES
EKG T AXIS: 11 DEGREES
EKG VENTRICULAR RATE: 60 BPM
EOSINOPHILS ABSOLUTE: 0.09 E9/L (ref 0.05–0.5)
EOSINOPHILS RELATIVE PERCENT: 1.4 % (ref 0–6)
GFR AFRICAN AMERICAN: >60
GFR NON-AFRICAN AMERICAN: 59 ML/MIN/1.73
GLUCOSE BLD-MCNC: 123 MG/DL (ref 74–99)
GLUCOSE URINE: NEGATIVE MG/DL
HCT VFR BLD CALC: 41.2 % (ref 34–48)
HEMOGLOBIN: 13 G/DL (ref 11.5–15.5)
IMMATURE GRANULOCYTES #: 0.02 E9/L
IMMATURE GRANULOCYTES %: 0.3 % (ref 0–5)
INR BLD: 1.1
KETONES, URINE: NEGATIVE MG/DL
LEUKOCYTE ESTERASE, URINE: NEGATIVE
LYMPHOCYTES ABSOLUTE: 1.32 E9/L (ref 1.5–4)
LYMPHOCYTES RELATIVE PERCENT: 20 % (ref 20–42)
MCH RBC QN AUTO: 29.5 PG (ref 26–35)
MCHC RBC AUTO-ENTMCNC: 31.6 % (ref 32–34.5)
MCV RBC AUTO: 93.6 FL (ref 80–99.9)
MONOCYTES ABSOLUTE: 0.57 E9/L (ref 0.1–0.95)
MONOCYTES RELATIVE PERCENT: 8.6 % (ref 2–12)
NEUTROPHILS ABSOLUTE: 4.56 E9/L (ref 1.8–7.3)
NEUTROPHILS RELATIVE PERCENT: 68.9 % (ref 43–80)
NITRITE, URINE: NEGATIVE
PDW BLD-RTO: 13.8 FL (ref 11.5–15)
PH UA: 7 (ref 5–9)
PLATELET # BLD: 276 E9/L (ref 130–450)
PMV BLD AUTO: 9.9 FL (ref 7–12)
POTASSIUM REFLEX MAGNESIUM: 4.4 MMOL/L (ref 3.5–5)
PROTEIN UA: NEGATIVE MG/DL
PROTHROMBIN TIME: 12.6 SEC (ref 9.3–12.4)
RBC # BLD: 4.4 E12/L (ref 3.5–5.5)
SARS-COV-2, NAAT: NOT DETECTED
SODIUM BLD-SCNC: 140 MMOL/L (ref 132–146)
SPECIFIC GRAVITY UA: 1.01 (ref 1–1.03)
TOTAL PROTEIN: 6.6 G/DL (ref 6.4–8.3)
TROPONIN: <0.01 NG/ML (ref 0–0.03)
UROBILINOGEN, URINE: 0.2 E.U./DL
WBC # BLD: 6.6 E9/L (ref 4.5–11.5)

## 2021-01-04 PROCEDURE — 93970 EXTREMITY STUDY: CPT

## 2021-01-04 PROCEDURE — 80053 COMPREHEN METABOLIC PANEL: CPT

## 2021-01-04 PROCEDURE — U0002 COVID-19 LAB TEST NON-CDC: HCPCS

## 2021-01-04 PROCEDURE — 84484 ASSAY OF TROPONIN QUANT: CPT

## 2021-01-04 PROCEDURE — 81003 URINALYSIS AUTO W/O SCOPE: CPT

## 2021-01-04 PROCEDURE — 93005 ELECTROCARDIOGRAM TRACING: CPT | Performed by: NURSE PRACTITIONER

## 2021-01-04 PROCEDURE — 97161 PT EVAL LOW COMPLEX 20 MIN: CPT

## 2021-01-04 PROCEDURE — 93970 EXTREMITY STUDY: CPT | Performed by: RADIOLOGY

## 2021-01-04 PROCEDURE — 85025 COMPLETE CBC W/AUTO DIFF WBC: CPT

## 2021-01-04 PROCEDURE — 99283 EMERGENCY DEPT VISIT LOW MDM: CPT

## 2021-01-04 PROCEDURE — 70450 CT HEAD/BRAIN W/O DYE: CPT

## 2021-01-04 PROCEDURE — 85610 PROTHROMBIN TIME: CPT

## 2021-01-04 PROCEDURE — 71046 X-RAY EXAM CHEST 2 VIEWS: CPT

## 2021-01-04 PROCEDURE — 97165 OT EVAL LOW COMPLEX 30 MIN: CPT

## 2021-01-04 PROCEDURE — 72125 CT NECK SPINE W/O DYE: CPT

## 2021-01-04 NOTE — PROGRESS NOTES
Occupational Therapy  OCCUPATIONAL THERAPY INITIAL EVALUATION      Date:2021  Patient Name: Joselyn Estrada  MRN: [id number]  : 1935  Room:     Evaluating OT: Anh Amos OTR/L #[id number]    AM-PAC Daily Activity Raw Score:     Recommended Adaptive Equipment: TBD     Diagnosis: No admission diagnoses are documented for this encounter. Referring Provider: Girish Andrew DO  Patient presented to ED for frequent falls and weakness    Surgery/Procedure: none   Pertinent Medical History: diabetes, HTN, left renal artery stenosis      Precautions:  Falls     Home Living: Pt lives alone in 1 story house w/ 1 QUANG w/ no rails; bed/bath on 1st floor   Bathroom setup: walk-in shower w/ shower chair   Equipment owned: rollator    Prior Level of Function: Independent with ADLs , Independent with IADLs; ambulated w/ rollator  Driving: No    Pain Level: Pt c/o no pain at this time  Cognition: A&O: 4/4; Follows 1-2 step directions   Memory:  fair    Sequencing:  fair +   Problem solving: fair +   Judgement/safety: fair      Functional Assessment:   Initial Eval Status  Date: 21 Treatment Status  Date: STGs/LTGs  Treatment frequency: 1-4x/wk   Feeding Set-up  Modified [place]     Grooming Stand by Assist   Modified [place]    UB Dressing Minimal Assist   Modified [place]    LB Dressing Minimal Assist   Modified [place]    Bathing Minimal Assist  Stand by Assist    Toileting Moderate Assist (use of bedpan)  Supervision   Bed Mobility  Supine to sit: Stand by Assist   Sit to supine: Stand by Assist   Supine to sit: Mod. I  Sit to supine: Mod. I    Functional Transfers Stand by Assist   Supervision    Functional Mobility Stand by Assist w/ ww (to/from sink in room)  Supervision    Balance Sitting:     Static: SBA    Dynamic:Min.  A  Standing: w/ ww    Static: SBA    Dynamic: SBA     Activity Tolerance Fair  good   Visual/  Perceptual Glasses: Yes        Safety          Hand dominance: R     ROM Strength  Additional Info:    RUE  WFL 4/5 good  and wfl FMC/dexterity noted during ADL tasks       LUE WFL 4/5 good  and wfl FMC/dexterity noted during ADL tasks         Hearing: WFL   Sensation:  No c/o numbness or tingling   Tone: WFL   Edema: none noted            Treatment: Upon arrival, patient lying in bed and agreeable to OT session at this time. RN approved. Therapist facilitated bed mobility, functional transfers (EOB, sit<>stand), standing tolerance tasks(addressing activity tolerance and balance) and functional mobility task with ww (cuing on posture, w/w management and safety) - skilled cuing on hand placement, posture, body mechanics and safety. Therapist facilitated self-care retraining: UB/LB self-care tasks(pants), toileting task(hygiene/clothing management)and standing grooming task(washed hands at sink) while educating pt on modified techniques, posture, safety and energy conservation techniques. Educated pt on taking rest breaks when feeling fatigued and using energy conservation strategies around the home to prevent fatigue/falls. Skilled monitoring of HR, O2 sats and pts response to treatment. At end of session, patient lying in bed w/ HOB elevated with call light and phone within reach, all lines and tubes intact. Comments:  Overall pt demonstrated decreased independence and safety during completion of ADL/functional transfers/mobility tasks. Pt demonstrating fair+ understanding of education/techniques, requiring additional training / education. Educated pt on having someone stay with her for awhile for increased assistance if needed and to prevent falls by having 24/7 supervision. Pt would benefit from continued skilled OT to increase functional independence and quality of life.       Eval Complexity: Low    Assessment of current deficits   Functional mobility [x]  ADLs [x] Strength [x]  Cognition []  Functional transfers  [x] IADLs [x] Safety Awareness [x]  Endurance [x]  Fine Motor Coordination [] Balance [x] Vision/perception [] Sensation []   Gross Motor Coordination [] ROM [] Delirium []                  Motor Control []    Plan of Care: 1-3 days/week for 1-2 weeks PRN   [x]ADL retraining/adapted techniques and AE recommendations to increase functional independence within precautions                    [x]Energy conservation techniques to improve tolerance for selfcare routine   [x]Functional transfer/mobility training/DME recommendations for increased independence, safety and fall prevention         [x]Patient/family education to increase safety and functional independence             [x]Environmental modifications for safe mobility and completion of ADLs                             []Cognitive retraining ex's to improve problem solving skills & safe participation in ADLs/IADLs     []Sensory re-education techniques to improve extremity awareness, maintain skin integrity and improve hand function                             []Visual/Perceptual retraining  to improve body awareness and safety during transfers and ADLs  []Splinting/positioning needs to maintain joint/skin integrity and prevent contractures  [x]Therapeutic activity to improve functional performance during ADLs. [x]Therapeutic exercise to improve tolerance and functional strength for ADLs   [x]Balance retraining/tolerance tasks for facilitation of postural control with dynamic challenges during ADLs .   []Neuromuscular re-education: facilitation of righting/equilibrium reactions, midline orientation, scapular stability/mobility, Normalization muscle     tone and facilitation active functional movement/Attention                         []Delirium prevention/treatment    []Positioning to improve functional independence  []Other:       Rehab Potential:  Good for established goals     Patient / Family Goal: not stated      Patient and/or

## 2021-01-04 NOTE — PROGRESS NOTES
Pt belongings ( shirt and coat) bagged and labeled and given to pt. Pt also has own wheel chair with her and optio label placed on it.

## 2021-01-04 NOTE — CARE COORDINATION
Social Work / Discharge Planning:    Pt presents to the ED secondary to a fall at home. SW met with pt who was alert and oriented x3, sitting up in bed. Pt reports living alone in a one floor home. Pt reports having a walker at home. Pt states her 3 children assist her at home with ADLs. Pt's PCP is Dr Litzy Hunt. Pt states family was in the process of setting up outpatient physical therapy and SW could contact pt's daughter, Evelia Valerio, for more information. SW spoke with Evelia Valerio who reports they were in the process of setting up outpatient cardio therapy through [facility] but pt also need PT. SW explained pt will need a prescription from PCP for outpatient PT. Pt's daughter verbalized understanding and also stated that pt's 3 children will rotate staying with pt upon discharge so she is not at home alone. Pt's son, Rita ([phone]) remains in the ED parking lot and will transport pt home upon discharge. Melo King RN, is aware.

## 2021-01-04 NOTE — ED NOTES
FIRST PROVIDER CONTACT ASSESSMENT NOTE      Department of Emergency Medicine   Admit Date: No admission date for patient encounter. Chief Complaint: Fall (fall yesterday and landed on her butt, fall last week and hit her head. + thinners. states her legs have been giving out beneath her)      History of Present Illness: Noreen Steinberg is a 80 y.o. female who presents to the ED for frequent falls and generalized weakness at home. She states she fell week hitting her head and she is on an unknown blood thinner. She also complains of low back pain.   She is alert and oriented, respirations easy nonlabored, strong radial pulse.        -----------------END OF FIRST PROVIDER CONTACT ASSESSMENT NOTE--------------  Electronically signed by JOB Jennings CNP   DD: 1/4/21               JOB Jennings CNP  01/04/21 1149

## 2021-01-04 NOTE — ED PROVIDER NOTES
Department of Emergency Medicine   ED  Provider Note  Admit Date/RoomTime: 1/4/2021 12:44 PM  ED Room: 16/16          History of Present Illness:  1/4/21, Time: 1:43 PM EST  Chief Complaint   Patient presents with    Fall     fall yesterday and landed on her butt, fall last week and hit her head. + thinners. states her legs have been giving out beneath her                Marney Jennifer is a 80 y.o. female presenting to the ED for fall at home and generalized weakness, beginning a few days. The complaint has been persistent, moderate in severity, and worsened by changing position. Patient presents for 2 falls at home as well as generalized weakness. Reports a fall last week, fell onto her bottom did not lose consciousness. She was ambulatory after the event. Also reports a fall yesterday, bent down to get close out of her dryer when she stood up she felt very lightheaded fell down did not lose consciousness but did hit her head. 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 She denies chest pain shortness of breath nausea vomiting. States her legs feel weak, does have a history of spinal stenosis. Believes she took her medicines today. Complains of chronic back pain but states no difference between it. Denies loss of bowel bladder control or saddle paresthesias    Review of Systems:   Pertinent positives and negatives are stated within HPI, all other systems reviewed and are negative.        --------------------------------------------- PAST HISTORY ---------------------------------------------  Past Medical History:  has a past medical history of Diabetes ([facility] [address] Hypertension, Left renal artery stenosis ([facility] [address] and Renal artery stenosis ([facility] [address] Past Surgical History:  has a past surgical history that includes Hysterectomy; Cholecystectomy; Eye surgery (Right); Hand surgery (Bilateral); knee surgery; blepharoplasty (Bilateral);  Colonoscopy; and Cardiac catheterization (10/09/2020). Social History:  reports that she quit smoking about 9 months ago. Her smoking use included cigarettes. She smoked 0.50 packs per day. She has never used smokeless tobacco. She reports that she does not drink alcohol or use drugs. Family History: family history is not on file. . Unless otherwise noted, family history is non contributory    The patients home medications have been reviewed. Allergies: Patient has no known allergies. ---------------------------------------------------PHYSICAL EXAM--------------------------------------    Constitutional/General: Alert and oriented x3  Head: Normocephalic and atraumatic  Eyes: PERRL, EOMI, sclera non icteric  Mouth: Oropharynx clear, handling secretions, no trismus, no asymmetry of the posterior oropharynx or uvular edema  Neck: Supple, full ROM, no stridor, no meningeal signs no JVD   Respiratory: Diminished throughout not in respiratory distress  Cardiovascular:  Regular rate. Regular rhythm. 2+ distal pulses. Equal extremity pulses. Chest: No chest wall tenderness  GI:  Abdomen Soft, Non tender, Non distended. No rebound, guarding, or rigidity. Musculoskeletal: Moves all extremities x 4 he is able to lift both legs off the bed and hold them for 5 seconds. . Warm and well perfused, no clubbing, cyanosis, there is +2 edema bilateral lower extremities. Capillary refill <3 seconds  Integument: skin warm and dry. There are chronic appearing wounds and the anterior medial side of bilateral extremities from previous graft harvesting  Neurologic: GCS 15, no focal deficits, symmetric strength 5/5 in the upper and lower extremities bilaterally  Psychiatric: Normal Affect      EKG: Interpreted by emergency department physician, Dr. Bianca Barroso   This EKG is signed and interpreted by me.     Rate: 60  Rhythm: Sinus  Interpretation: Sinus rhythm, right axis, right bundle branch block, borderline left ventricular perjury, DE is 168, QRS is 136, QTc is 472. There are changes compared to prior EKG from 10/13/2020, however prior EKG was obtained before patient's bypass grafting  Comparison: changes compared to previous EKG      -------------------------------------------------- RESULTS -------------------------------------------------  I have personally reviewed all laboratory and imaging results for this patient. Results are listed below.      LABS: (Lab results interpreted by me)  Results for orders placed or performed during the hospital encounter of 01/04/21   CBC Auto Differential   Result Value Ref Range    WBC 6.6 4.5 - 11.5 E9/L    RBC 4.40 3.50 - 5.50 E12/L    Hemoglobin 13.0 11.5 - 15.5 g/dL    Hematocrit 41.2 34.0 - 48.0 %    MCV 93.6 80.0 - 99.9 fL    MCH 29.5 26.0 - 35.0 pg    MCHC 31.6 (L) 32.0 - 34.5 %    RDW 13.8 11.5 - 15.0 fL    Platelets 694 240 - 286 E9/L    MPV 9.9 7.0 - 12.0 fL    Neutrophils % 68.9 43.0 - 80.0 %    Immature Granulocytes % 0.3 0.0 - 5.0 %    Lymphocytes % 20.0 20.0 - 42.0 %    Monocytes % 8.6 2.0 - 12.0 %    Eosinophils % 1.4 0.0 - 6.0 %    Basophils % 0.8 0.0 - 2.0 %    Neutrophils Absolute 4.56 1.80 - 7.30 E9/L    Immature Granulocytes # 0.02 E9/L    Lymphocytes Absolute 1.32 (L) 1.50 - 4.00 E9/L    Monocytes Absolute 0.57 0.10 - 0.95 E9/L    Eosinophils Absolute 0.09 0.05 - 0.50 E9/L    Basophils Absolute 0.05 0.00 - 0.20 E9/L   Comprehensive Metabolic Panel w/ Reflex to MG   Result Value Ref Range    Sodium 140 132 - 146 mmol/L    Potassium reflex Magnesium 4.4 3.5 - 5.0 mmol/L    Chloride 102 98 - 107 mmol/L    CO2 29 22 - 29 mmol/L    Anion Gap 9 7 - 16 mmol/L    Glucose 123 (H) 74 - 99 mg/dL    BUN 22 8 - 23 mg/dL    CREATININE 0.9 0.5 - 1.0 mg/dL    GFR Non-African American 59 >=60 mL/min/1.73    GFR African American >60     Calcium 9.5 8.6 - 10.2 mg/dL    Total Protein 6.6 6.4 - 8.3 g/dL    Alb 3.9 3.5 - 5.2 g/dL    Total Bilirubin 0.3 0.0 - 1.2 mg/dL    Alkaline Phosphatase 105 (H) 35 - 104 U/L ALT 11 0 - 32 U/L    AST 13 0 - 31 U/L   Troponin   Result Value Ref Range    Troponin <0.01 0.00 - 0.03 ng/mL   Protime-INR   Result Value Ref Range    Protime 12.6 (H) 9.3 - 12.4 sec    INR 1.1    Urinalysis   Result Value Ref Range    Color, UA Yellow Straw/Yellow    Clarity, UA Clear Clear    Glucose, Ur Negative Negative mg/dL    Bilirubin Urine Negative Negative    Ketones, Urine Negative Negative mg/dL    Specific Gravity, UA 1.015 1.005 - 1.030    Blood, Urine Negative Negative    pH, UA 7.0 5.0 - 9.0    Protein, UA Negative Negative mg/dL    Urobilinogen, Urine 0.2 <2.0 E.U./dL    Nitrite, Urine Negative Negative    Leukocyte Esterase, Urine Negative Negative   COVID-19   Result Value Ref Range    SARS-CoV-2, NAAT Not Detected Not Detected   EKG 12 Lead   Result Value Ref Range    Ventricular Rate 60 BPM    Atrial Rate 60 BPM    P-R Interval 168 ms    QRS Duration 136 ms    Q-T Interval 472 ms    QTc Calculation (Bazett) 472 ms    P Axis 34 degrees    R Axis -20 degrees    T Axis 11 degrees   ,       RADIOLOGY:  Interpreted by Radiologist unless otherwise specified  US DUP LOWER EXTREMITIES BILATERAL VENOUS   Final Result   No evidence for deep venous thrombosis in the bilateral lower   extremities. XR CHEST (2 VW)   Final Result   No active process. Postoperative changes. 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 CT HEAD WO CONTRAST   Final Result   1. There is no acute intracranial abnormality. Specifically, there is no   intracranial hemorrhage. 2. Atrophy and periventricular leukomalacia,      CT CERVICAL SPINE WO CONTRAST   Final Result   1. There is no acute compression fracture or subluxation of the cervical   spine.    2. Multilevel degenerative disc and degenerative joint disease.                       ------------------------- NURSING NOTES AND VITALS REVIEWED ---------------------------   The nursing notes within the ED encounter and vital signs as below have been reviewed by myself  BP (!) 192/62   Pulse 63   Temp 98 °F (36.7 °C)   Resp 25   SpO2 96%     Oxygen Saturation Interpretation: Normal    The cardiac monitor revealed NSR with a heart rate in the 70s as interpreted by me. The cardiac monitor was ordered secondary to the patient's heart rate and to monitor the patient for dysrhythmia. CPT 67124    The patients available past medical records and past encounters were reviewed. ------------------------------ ED COURSE/MEDICAL DECISION MAKING----------------------  Medications - No data to display                 Medical Decision Making:     I, Dr. Loli Glover am the primary provider of record    Work up undertaken, no acute process identified, patient was due for her blood pressure medicine at 3:00, she did take her evening metoprolol at approximately 5 PM.   was consulted secondary to patient's frequent falls. She did well with PT OT. They have spoke with the patient's family and made arrangements for outpatient follow-up and outpatient PT and OT. Patient states she feels well is requesting discharge home. Will discharge home with outpatient follow-up return for worsening signs or symptoms. Re-Evaluations:          Re-evaluation. Patients symptoms show no change  Repeat physical examination is not changed        This patient's ED course included: a personal history and physicial examination, re-evaluation prior to disposition, cardiac monitoring, continuous pulse oximetry and complex medical decision making and emergency management    This patient has been closely monitored during their ED course. Consultations:  Social Work/ PT-OT      Critical Care:         Counseling: The emergency provider has spoken with the patient and discussed todays results, in addition to providing specific details for the plan of care and counseling regarding the diagnosis and prognosis.   Questions are answered at this time and they are agreeable with the plan.       --------------------------------- IMPRESSION AND DISPOSITION ---------------------------------    IMPRESSION  1. Fall at home, initial encounter    2. Peripheral edema    3. Essential hypertension        DISPOSITION  Disposition: Discharge to home  Patient condition is stable        NOTE: This report was transcribed using voice recognition software.  Every effort was made to ensure accuracy; however, inadvertent computerized transcription errors may be present        Elizabeth Jenkins DO  01/04/21 0243

## 2021-01-04 NOTE — PROGRESS NOTES
Pt in X-ray at time of room assingment given in the ED. Spoke with Amanda Fajardo in X-ray and made her aware that pt would be going to room 16 when scans were complete. Section RN made aware that pt would need to be placed on the monitor.

## 2021-01-04 NOTE — PROGRESS NOTES
Physical Therapy  Physical Therapy Initial Assessment     Name: Aracelis Kang  : 1935  MRN: [id number]    Referring Provider: Rosalind Mcmillan DO    Date of Service: 2021    Evaluating PT: Cris Chung, PT, DPT, [id number]    Room #:   Diagnosis: Falls  PMHx/PSHx: HTN, DM, renal artery stenosis  Precautions: Fall risk    SUBJECTIVE:    Pt lives alone in a single story house with 1 stair(s) and 0 rail(s) to enter. Bed is on first floor and bath is on first floor. Pt ambulated with rollator walker prior to admission. OBJECTIVE:   Initial Evaluation  Date: 21 Treatment Date: Short Term/ Long Term   Goals   AM-PAC 6 Clicks      Was pt agreeable to Eval/treatment? Yes     Does pt have pain? No current complaints of pain     Bed Mobility  Rolling: NT  Supine to sit: SBA  Sit to supine: SBA  Scooting: SBA to EOB  Rolling: Independent   Supine to sit: Independent   Sit to supine: Independent   Scooting: Independent    Transfers Sit to stand: SBA  Stand to sit: SBA  Stand pivot: SBA with [employer]  Sit to stand: Independent   Stand to sit: Independent   Stand pivot: Supervision with [employer]   Ambulation   50 feet with WW with SBA  200 feet with [employer] with Supervision   Stair negotiation: ascended and descended NT  1 platform step with [employer] with Supervision   ROM BUE: WFL  BLE: WFL     Strength BUE: WFL  BLE: WFL     Balance Sitting EOB: Supervision  Dynamic Standing: SBA with [employer]  Sitting EOB: Independent   Dynamic Standing: Supervision with [employer]     Pt is A & O x: 4 to person, place, month/year, and situation. Sensation: Pt reports chronic B LE \"burning\". Edema: NT    Patient education  Pt educated on PT role in acute care setting. Patient response to education:   Pt verbalized understanding Pt demonstrated skill Pt requires further education in this area   Yes NA No      ASSESSMENT:    Comments:   Pt was supine in bed upon room entry, agreeable to PT evaluation.  Pt completed all mobility without need for hands on assist. Pt ambulates slowly but has fair steadiness. Pt reports son will be able to stay with her at discharge. Pt was instructed to take seated rest breaks when standing for longer periods of time (pt reports falls occur due to fatigue with prolonged standing). All questions and concerns were addressed. Pt was left in bed with all needs met at conclusion of session. SW was notified of pt's performance and PT recommendation for 24/7 supervision and assist once discharged home. Treatment:  Patient practiced and was instructed in the following treatment:     Therapeutic activities: Pt completed all therapeutic activities noted above. Pt was cued for hand placement during sit <> stand transfers. Pt ambulated short distance with [employer] as endurance and balance were challenged. Pt was educated on safety/fall prevention once discharged home. Pt's/family goals:   1. To return home. Patient and or family understand(s) diagnosis, prognosis, and plan of care. Yes. PLAN:    Current Treatment Recommendations   [x] Strengthening     [] ROM   [x] Balance Training   [x] Endurance Training   [x] Transfer Training   [x] Gait Training   [x] Stair Training   [] Positioning   [] Safety and Education Training   [] Patient/Caregiver Education   [] HEP  [] Other     PT care will be provided in accordance with the objectives noted above. Exercises and functional mobility practice will be used as well as appropriate assistive devices or modalities to obtain goals. Patient and family education will also be administered as needed. Frequency of treatments: 2-5x/week x 1-2 days.     Time in: 1530  Time out: 1545    Total Treatment Time 0 minutes     Evaluation Time includes thorough review of current medical information, gathering information on past medical history/social history and prior level of function, completion of standardized testing/informal observation of tasks, assessment of data and education on plan of care and goals.     CPT codes:  [x] Low Complexity PT evaluation 29894  [] Moderate Complexity PT evaluation 32684  [] High Complexity PT evaluation 26955  [] PT Re-evaluation 95048  [] Gait training 21049 0 minutes  [] Manual therapy 15060 0 minutes  [] Therapeutic activities 18526 0 minutes  [] Therapeutic exercises 44374 0 minutes  [] Neuromuscular reeducation 93832 0 minutes     Fred Contreras, PT, DPT  [id number]

## 2021-01-04 NOTE — ED NOTES
Bed: 16  Expected date: 1/4/21  Expected time:   Means of arrival:   Comments:  triage     Tawana Doan RN  01/04/21 [phone]

## 2021-01-09 ENCOUNTER — TELEPHONE (OUTPATIENT)
Dept: CARDIAC REHAB | Age: 86
End: 2021-01-09

## 2021-01-13 ENCOUNTER — HOSPITAL ENCOUNTER (OUTPATIENT)
Dept: CARDIAC REHAB | Age: 86
Discharge: HOME | End: 2021-01-13

## 2021-01-13 VITALS
WEIGHT: 150 LBS | SYSTOLIC BLOOD PRESSURE: 187 MMHG | HEIGHT: 63 IN | HEART RATE: 58 BPM | RESPIRATION RATE: 24 BRPM | DIASTOLIC BLOOD PRESSURE: 88 MMHG | BODY MASS INDEX: 26.58 KG/M2

## 2021-01-13 RX ORDER — CLOPIDOGREL BISULFATE 75 MG/1
75 TABLET ORAL DAILY
COMMUNITY
End: 2022-05-30

## 2021-01-13 ASSESSMENT — PATIENT HEALTH QUESTIONNAIRE - PHQ9: SUM OF ALL RESPONSES TO PHQ QUESTIONS 1-9: 14

## 2021-01-28 ENCOUNTER — HOSPITAL ENCOUNTER (EMERGENCY)
Age: 86
Discharge: HOME | End: 2021-01-28
Attending: EMERGENCY MEDICINE
Payer: MEDICARE

## 2021-01-28 ENCOUNTER — APPOINTMENT (OUTPATIENT)
Dept: CT IMAGING | Age: 86
End: 2021-01-28
Payer: MEDICARE

## 2021-01-28 VITALS
BODY MASS INDEX: 26.58 KG/M2 | WEIGHT: 150 LBS | HEIGHT: 63 IN | DIASTOLIC BLOOD PRESSURE: 65 MMHG | RESPIRATION RATE: 16 BRPM | OXYGEN SATURATION: 97 % | SYSTOLIC BLOOD PRESSURE: 138 MMHG | HEART RATE: 65 BPM | TEMPERATURE: 97.6 F

## 2021-01-28 DIAGNOSIS — S09.90XA CLOSED HEAD INJURY, INITIAL ENCOUNTER: ICD-10-CM

## 2021-01-28 DIAGNOSIS — S09.90XA INJURY OF HEAD, INITIAL ENCOUNTER: Primary | ICD-10-CM
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
## 2021-01-28 LAB
ANION GAP SERPL CALCULATED.3IONS-SCNC: 11 MMOL/L (ref 7–16)
BASOPHILS ABSOLUTE: 0.04 E9/L (ref 0–0.2)
BASOPHILS RELATIVE PERCENT: 0.6 % (ref 0–2)
BUN BLDV-MCNC: 27 MG/DL (ref 8–23)
CALCIUM SERPL-MCNC: 10 MG/DL (ref 8.6–10.2)
CHLORIDE BLD-SCNC: 99 MMOL/L (ref 98–107)
CO2: 29 MMOL/L (ref 22–29)
CREAT SERPL-MCNC: 0.9 MG/DL (ref 0.5–1)
EOSINOPHILS ABSOLUTE: 0.16 E9/L (ref 0.05–0.5)
EOSINOPHILS RELATIVE PERCENT: 2.2 % (ref 0–6)
GFR AFRICAN AMERICAN: >60
GFR NON-AFRICAN AMERICAN: 59 ML/MIN/1.73
GLUCOSE BLD-MCNC: 106 MG/DL (ref 74–99)
HCT VFR BLD CALC: 43.9 % (ref 34–48)
HEMOGLOBIN: 13.9 G/DL (ref 11.5–15.5)
IMMATURE GRANULOCYTES #: 0.03 E9/L
IMMATURE GRANULOCYTES %: 0.4 % (ref 0–5)
LACTIC ACID, SEPSIS: 1.1 MMOL/L (ref 0.5–1.9)
LYMPHOCYTES ABSOLUTE: 1.31 E9/L (ref 1.5–4)
LYMPHOCYTES RELATIVE PERCENT: 18.2 % (ref 20–42)
MCH RBC QN AUTO: 29.4 PG (ref 26–35)
MCHC RBC AUTO-ENTMCNC: 31.7 % (ref 32–34.5)
MCV RBC AUTO: 92.8 FL (ref 80–99.9)
MONOCYTES ABSOLUTE: 0.61 E9/L (ref 0.1–0.95)
MONOCYTES RELATIVE PERCENT: 8.5 % (ref 2–12)
NEUTROPHILS ABSOLUTE: 5.06 E9/L (ref 1.8–7.3)
NEUTROPHILS RELATIVE PERCENT: 70.1 % (ref 43–80)
PDW BLD-RTO: 13.5 FL (ref 11.5–15)
PLATELET # BLD: 255 E9/L (ref 130–450)
PMV BLD AUTO: 10 FL (ref 7–12)
POTASSIUM REFLEX MAGNESIUM: 4.2 MMOL/L (ref 3.5–5)
RBC # BLD: 4.73 E12/L (ref 3.5–5.5)
SODIUM BLD-SCNC: 139 MMOL/L (ref 132–146)
WBC # BLD: 7.2 E9/L (ref 4.5–11.5)

## 2021-01-28 PROCEDURE — 70450 CT HEAD/BRAIN W/O DYE: CPT

## 2021-01-28 PROCEDURE — 72125 CT NECK SPINE W/O DYE: CPT

## 2021-01-28 PROCEDURE — 83605 ASSAY OF LACTIC ACID: CPT

## 2021-01-28 PROCEDURE — 99283 EMERGENCY DEPT VISIT LOW MDM: CPT

## 2021-01-28 PROCEDURE — 93005 ELECTROCARDIOGRAM TRACING: CPT | Performed by: GENERAL PRACTICE

## 2021-01-28 PROCEDURE — 36415 COLL VENOUS BLD VENIPUNCTURE: CPT

## 2021-01-28 PROCEDURE — 80048 BASIC METABOLIC PNL TOTAL CA: CPT

## 2021-01-28 PROCEDURE — 85025 COMPLETE CBC W/AUTO DIFF WBC: CPT

## 2021-01-28 RX ORDER — SODIUM CHLORIDE 0.9 % (FLUSH) 0.9 %
SYRINGE (ML) INJECTION
Status: DISCONTINUED
Start: 2021-01-28 | End: 2021-01-28 | Stop reason: HOSPADM

## 2021-01-28 ASSESSMENT — ENCOUNTER SYMPTOMS
WHEEZING: 0
VOMITING: 0
DIARRHEA: 0
SORE THROAT: 0
CHOKING: 0
SHORTNESS OF BREATH: 0
CHEST TIGHTNESS: 0
ABDOMINAL PAIN: 0
EYE PAIN: 0
NAUSEA: 0
SINUS PAIN: 0
COUGH: 0

## 2021-01-28 NOTE — ED PROVIDER NOTES
ED  Provider Note  Admit Date/RoomTime: 1/28/2021  4:32 PM  ED Room: [facility]/[place] [address]     HPI:   Sarah Wooten is a 80 y.o. female presenting to the ED for fall, beginning 24 hours ago. History comes primarily from the patient. Past medical history includes coronary artery disease (status post CABG in October 2020), hypertension. The complaint has been intermittent, moderate in severity, improved by nothing and worsened by nothing. Bonnie Figueroa states that she has fallen 3 times in the last month secondary to \"my legs giving out\". He states that she has spinal stenosis, and that she believes that occasionally this leads her legs to feel weak and not be able to bear her own weight. This occurred yesterday, and she fell backwards, striking the back of her head against a cement floor. She did not lose consciousness during this fall, remembers the entire event, had no syncope or palpitations preceding the fall, and was able to get off the ground under her own power. She has had no focal neurologic deficits ever since the fall, and is having no nausea or vomiting or vision changes. Her daughters are very concerned about her wellbeing, and they convinced her to come to the emergency department for evaluation for this fall today. On arrival, she was assessed with history, physical exam, imaging studies, laboratory studies, vital signs. Patient's vital signs were stable on arrival and she was afebrile. Review of Systems   Constitutional: Negative for appetite change, chills and fever. HENT: Negative for sinus pain and sore throat. Eyes: Negative for pain and visual disturbance. Respiratory: Negative for cough, choking, chest tightness, shortness of breath and wheezing. Cardiovascular: Negative for chest pain and palpitations. Gastrointestinal: Negative for abdominal pain, diarrhea, nausea and vomiting. Genitourinary: Negative for hematuria. Musculoskeletal: Positive for gait problem.  Negative for neck pain and neck stiffness. Skin: Negative for rash. Neurological: Negative for tremors, syncope and weakness. Psychiatric/Behavioral: Negative for confusion. Physical Exam  Vitals signs and nursing note reviewed. Constitutional:       Appearance: Normal appearance. She is well-developed. She is not ill-appearing. HENT:      Head: Normocephalic and atraumatic. Mouth/Throat:      Mouth: Mucous membranes are moist.   Eyes:      Pupils: Pupils are equal, round, and reactive to light. Neck:      Musculoskeletal: Normal range of motion and neck supple. Cardiovascular:      Rate and Rhythm: Normal rate and regular rhythm. Pulmonary:      Effort: Pulmonary effort is normal. No respiratory distress. Breath sounds: Normal breath sounds. No wheezing or rales. Abdominal:      General: Bowel sounds are normal.      Palpations: Abdomen is soft. Tenderness: There is no abdominal tenderness. There is no guarding or rebound. Skin:     General: Skin is warm and dry. Capillary Refill: Capillary refill takes less than 2 seconds. Comments: Ecchymosis noted on the occipital scalp at the point of impact from the patient's fall yesterday. No break in skin tissue. Neurological:      General: No focal deficit present. Mental Status: She is alert and oriented to person, place, and time. Cranial Nerves: No cranial nerve deficit. Sensory: No sensory deficit. Motor: No weakness. Coordination: Coordination normal.      Gait: Gait normal.   Psychiatric:         Mood and Affect: Mood normal.          Procedures     MDM  Number of Diagnoses or Management Options  Closed head injury, initial encounter  Fall, initial encounter  Injury of head, initial encounter  Diagnosis management comments: Emergency Department evaluation was notable for a clear head CT with no evidence of bleed, no laboratory abnormalities, and a unremarkable EKG.   Her vital signs been stable for the entirety of her emergency department stay, her symptoms remain stable, and there was no indication for admission at the time of discharge. They were advised to return to the emergency department should they develop fever, chills, night sweats, nausea, vomiting, diarrhea, chest pain, shortness of breath, or worsening of their symptoms despite treatment from this emergency department visit. They were instructed to follow-up with their primary care provider in 2 days. This information was relayed to the patient who understood this plan of care and was amenable to the plan.            --------------------------------------------- PAST HISTORY ---------------------------------------------  Past Medical History:  has a past medical history of Diabetes ([facility] [address] Hypertension, Left renal artery stenosis ([facility] [address] and Renal artery stenosis ([facility] [address] Past Surgical History:  has a past surgical history that includes Hysterectomy; Cholecystectomy; Eye surgery (Right); Hand surgery (Bilateral); knee surgery; blepharoplasty (Bilateral); Colonoscopy; and Cardiac catheterization (10/09/2020). Social History:  reports that she quit smoking about 10 months ago. Her smoking use included cigarettes. She smoked 0.50 packs per day. She has never used smokeless tobacco. She reports that she does not drink alcohol or use drugs. Family History: family history is not on file. The patients home medications have been reviewed. Allergies: Patient has no known allergies.     -------------------------------------------------- RESULTS -------------------------------------------------  Labs:  Results for orders placed or performed during the hospital encounter of 01/28/21   CBC Auto Differential   Result Value Ref Range    WBC 7.2 4.5 - 11.5 E9/L    RBC 4.73 3.50 - 5.50 E12/L    Hemoglobin 13.9 11.5 - 15.5 g/dL    Hematocrit 43.9 34.0 - 48.0 %    MCV 92.8 80.0 - 99.9 fL    MCH 29.4 26.0 - 35.0 pg    MCHC 31.7 (L) 32.0 - 34.5 %    RDW 13.5 11.5 - 15.0 fL    Platelets 598 059 - 566 E9/L    MPV 10.0 7.0 - 12.0 fL    Neutrophils % 70.1 43.0 - 80.0 %    Immature Granulocytes % 0.4 0.0 - 5.0 %    Lymphocytes % 18.2 (L) 20.0 - 42.0 %    Monocytes % 8.5 2.0 - 12.0 %    Eosinophils % 2.2 0.0 - 6.0 %    Basophils % 0.6 0.0 - 2.0 %    Neutrophils Absolute 5.06 1.80 - 7.30 E9/L    Immature Granulocytes # 0.03 E9/L    Lymphocytes Absolute 1.31 (L) 1.50 - 4.00 E9/L    Monocytes Absolute 0.61 0.10 - 0.95 E9/L    Eosinophils Absolute 0.16 0.05 - 0.50 E9/L    Basophils Absolute 0.04 0.00 - 0.20 I2/O   Basic Metabolic Panel w/ Reflex to MG   Result Value Ref Range    Sodium 139 132 - 146 mmol/L    Potassium reflex Magnesium 4.2 3.5 - 5.0 mmol/L    Chloride 99 98 - 107 mmol/L    CO2 29 22 - 29 mmol/L    Anion Gap 11 7 - 16 mmol/L    Glucose 106 (H) 74 - 99 mg/dL    BUN 27 (H) 8 - 23 mg/dL    CREATININE 0.9 0.5 - 1.0 mg/dL    GFR Non-African American 59 >=60 mL/min/1.73    GFR African American >60     Calcium 10.0 8.6 - 10.2 mg/dL   Lactate, Sepsis   Result Value Ref Range    Lactic Acid, Sepsis 1.1 0.5 - 1.9 mmol/L   EKG 12 Lead   Result Value Ref Range    Ventricular Rate 67 BPM    Atrial Rate 67 BPM    P-R Interval 146 ms    QRS Duration 138 ms    Q-T Interval 464 ms    QTc Calculation (Bazett) 490 ms    P Axis 84 degrees    R Axis -29 degrees    T Axis 9 degrees       Radiology:  CT Head WO Contrast   Final Result   Right posterior parietal scalp hematoma with no associated fractures evidence   of acute intracranial trauma. Periventricular white matter changes consistent chronic microvascular   disease. Diffuse volume loss. CT Cervical Spine WO Contrast   Final Result   No acute abnormality of the cervical spine.    Multilevel degenerative changes with grade 1 anterolisthesis C3 over C4.          ------------------------- NURSING NOTES AND VITALS REVIEWED ---------------------------  Date / Time Roomed:  1/28/2021  4:32 PM  ED Bed Assignment:

## 2021-01-29 LAB
EKG ATRIAL RATE: 67 BPM
EKG P AXIS: 84 DEGREES
EKG P-R INTERVAL: 146 MS
EKG Q-T INTERVAL: 464 MS
EKG QRS DURATION: 138 MS
EKG QTC CALCULATION (BAZETT): 490 MS
EKG R AXIS: -29 DEGREES
EKG T AXIS: 9 DEGREES
EKG VENTRICULAR RATE: 67 BPM

## 2021-01-29 PROCEDURE — 93010 ELECTROCARDIOGRAM REPORT: CPT | Performed by: INTERNAL MEDICINE

## 2021-01-30 ENCOUNTER — IMMUNIZATION (OUTPATIENT)
Dept: PRIMARY CARE CLINIC | Age: 86
End: 2021-01-30
Payer: MEDICARE

## 2021-01-30 PROCEDURE — 0001A COVID-19, PFIZER VACCINE 30MCG/0.3ML DOSE: CPT | Performed by: INTERNAL MEDICINE

## 2021-01-30 PROCEDURE — 91300 COVID-19, PFIZER VACCINE 30MCG/0.3ML DOSE: CPT | Performed by: INTERNAL MEDICINE

## 2021-02-01 PROCEDURE — 9900000038 HC CARDIAC REHAB PHASE 3 - MONTHLY

## 2021-02-20 ENCOUNTER — IMMUNIZATION (OUTPATIENT)
Dept: PRIMARY CARE CLINIC | Age: 86
End: 2021-02-20
Payer: MEDICARE

## 2021-02-20 PROCEDURE — 91300 COVID-19, PFIZER VACCINE 30MCG/0.3ML DOSE: CPT | Performed by: FAMILY MEDICINE

## 2021-02-20 PROCEDURE — 0002A COVID-19, PFIZER VACCINE 30MCG/0.3ML DOSE: CPT | Performed by: FAMILY MEDICINE

## 2021-02-22 ENCOUNTER — HOSPITAL ENCOUNTER (OUTPATIENT)
Dept: CARDIAC REHAB | Age: 86
Discharge: HOME | End: 2021-02-22

## 2021-03-04 PROCEDURE — 9900000038 HC CARDIAC REHAB PHASE 3 - MONTHLY

## 2021-03-29 ENCOUNTER — HOSPITAL ENCOUNTER (OUTPATIENT)
Dept: CARDIAC REHAB | Age: 86
Discharge: HOME | End: 2021-03-29

## 2021-04-01 PROCEDURE — 9900000038 HC CARDIAC REHAB PHASE 3 - MONTHLY

## 2021-04-26 ENCOUNTER — HOSPITAL ENCOUNTER (OUTPATIENT)
Dept: CARDIAC REHAB | Age: 86
Discharge: HOME | End: 2021-04-26

## 2022-05-30 ENCOUNTER — APPOINTMENT (OUTPATIENT)
Dept: CT IMAGING | Age: 87
End: 2022-05-30
Payer: MEDICARE

## 2022-05-30 ENCOUNTER — APPOINTMENT (OUTPATIENT)
Dept: GENERAL RADIOLOGY | Age: 87
End: 2022-05-30
Payer: MEDICARE

## 2022-05-30 ENCOUNTER — HOSPITAL ENCOUNTER (EMERGENCY)
Age: 87
Discharge: HOME | End: 2022-05-30
Attending: EMERGENCY MEDICINE
Payer: MEDICARE

## 2022-05-30 VITALS
RESPIRATION RATE: 16 BRPM | OXYGEN SATURATION: 94 % | TEMPERATURE: 98.6 F | DIASTOLIC BLOOD PRESSURE: 84 MMHG | HEART RATE: 60 BPM | SYSTOLIC BLOOD PRESSURE: 142 MMHG

## 2022-05-30 DIAGNOSIS — S81.811A LEG LACERATION, RIGHT, INITIAL ENCOUNTER: Primary | ICD-10-CM

## 2022-05-30 DIAGNOSIS — S09.90XA CLOSED HEAD INJURY, INITIAL ENCOUNTER: ICD-10-CM
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
## 2022-05-30 PROCEDURE — 70450 CT HEAD/BRAIN W/O DYE: CPT

## 2022-05-30 PROCEDURE — 73590 X-RAY EXAM OF LOWER LEG: CPT

## 2022-05-30 PROCEDURE — 90471 IMMUNIZATION ADMIN: CPT | Performed by: EMERGENCY MEDICINE

## 2022-05-30 PROCEDURE — 6360000002 HC RX W HCPCS: Performed by: EMERGENCY MEDICINE

## 2022-05-30 PROCEDURE — 2500000003 HC RX 250 WO HCPCS: Performed by: EMERGENCY MEDICINE

## 2022-05-30 PROCEDURE — 13121 CMPLX RPR S/A/L 2.6-7.5 CM: CPT

## 2022-05-30 PROCEDURE — 90714 TD VACC NO PRESV 7 YRS+ IM: CPT | Performed by: EMERGENCY MEDICINE

## 2022-05-30 PROCEDURE — 72125 CT NECK SPINE W/O DYE: CPT

## 2022-05-30 PROCEDURE — 99284 EMERGENCY DEPT VISIT MOD MDM: CPT

## 2022-05-30 PROCEDURE — 13122 CMPLX RPR S/A/L ADDL 5 CM/>: CPT

## 2022-05-30 RX ORDER — TETANUS AND DIPHTHERIA TOXOIDS ADSORBED 2; 2 [LF]/.5ML; [LF]/.5ML
0.5 INJECTION INTRAMUSCULAR ONCE
Status: COMPLETED | OUTPATIENT
Start: 2022-05-30 | End: 2022-05-30

## 2022-05-30 RX ORDER — AMOXICILLIN AND CLAVULANATE POTASSIUM 875; 125 MG/1; MG/1
1 TABLET, FILM COATED ORAL 2 TIMES DAILY
Qty: 14 TABLET | Refills: 0 | Status: SHIPPED | OUTPATIENT
Start: 2022-05-30 | End: 2022-06-06

## 2022-05-30 RX ORDER — LORATADINE 10 MG/1
10 CAPSULE, LIQUID FILLED ORAL DAILY
COMMUNITY

## 2022-05-30 RX ORDER — LIDOCAINE HYDROCHLORIDE 10 MG/ML
INJECTION, SOLUTION INFILTRATION; PERINEURAL
Status: DISCONTINUED
Start: 2022-05-30 | End: 2022-05-30 | Stop reason: HOSPADM

## 2022-05-30 RX ORDER — ATORVASTATIN CALCIUM 10 MG/1
10 TABLET, FILM COATED ORAL DAILY
COMMUNITY

## 2022-05-30 RX ORDER — LIDOCAINE HYDROCHLORIDE 10 MG/ML
20 INJECTION, SOLUTION INFILTRATION; PERINEURAL ONCE
Status: COMPLETED | OUTPATIENT
Start: 2022-05-30 | End: 2022-05-30

## 2022-05-30 RX ORDER — OMEPRAZOLE 40 MG/1
40 CAPSULE, DELAYED RELEASE ORAL DAILY
COMMUNITY

## 2022-05-30 RX ADMIN — TETANUS AND DIPHTHERIA TOXOIDS ADSORBED 0.5 ML: 2; 2 INJECTION INTRAMUSCULAR at 14:32

## 2022-05-30 RX ADMIN — LIDOCAINE HYDROCHLORIDE 20 ML: 10 INJECTION, SOLUTION INFILTRATION; PERINEURAL at 16:37

## 2022-05-30 ASSESSMENT — PAIN DESCRIPTION - ORIENTATION: ORIENTATION: RIGHT

## 2022-05-30 ASSESSMENT — ENCOUNTER SYMPTOMS
RHINORRHEA: 0
STRIDOR: 0
NAUSEA: 0
BACK PAIN: 0
EYE PAIN: 0
WHEEZING: 0
SHORTNESS OF BREATH: 0
COLOR CHANGE: 0
FACIAL SWELLING: 0
ABDOMINAL PAIN: 0
VOMITING: 0

## 2022-05-30 ASSESSMENT — PAIN DESCRIPTION - LOCATION: LOCATION: LEG

## 2022-05-30 ASSESSMENT — PAIN SCALES - GENERAL: PAINLEVEL_OUTOF10: 9

## 2022-05-30 ASSESSMENT — PAIN - FUNCTIONAL ASSESSMENT: PAIN_FUNCTIONAL_ASSESSMENT: 0-10

## 2022-05-30 ASSESSMENT — PAIN DESCRIPTION - DESCRIPTORS: DESCRIPTORS: SHARP;SORE;STABBING

## 2022-05-30 NOTE — ED PROVIDER NOTES
ED PROVIDER NOTE    Chief Complaint   Patient presents with    Fall     patient at home waiting for family to move truck and legs became weak, slid down has laceration on right lower leg and raised area on back of head. denies loc. HPI:  5/30/22,   Time: 2:12 PM EDT       Naveed France is a 80 y.o. female presenting to the ED for fall. Acute onset shortly prior to arrival. Legs give out frequently due to bilateral knee OA, which happened today while standing outside of a car. Earlyne Jazlyn to the ground. +head trauma. No LOC. Laceration to right lower leg, painful, constant throbbing pain, nonradiating, worse w/ movement. Not on anticoagulation. No neck/back/chest/abdominal/pelvic pain. No preceding dizziness, chest pain, shortness of breath. Chart review: hx of HTN, DM, CAD s/p CABG, afib    Review of Systems:     Review of Systems   Constitutional: Negative for diaphoresis. HENT: Negative for ear pain, facial swelling, nosebleeds and rhinorrhea. Eyes: Negative for pain and visual disturbance. Respiratory: Negative for shortness of breath, wheezing and stridor. Cardiovascular: Negative for chest pain. Gastrointestinal: Negative for abdominal pain, nausea and vomiting. Genitourinary: Negative for flank pain and pelvic pain. Musculoskeletal: Positive for myalgias. Negative for arthralgias, back pain, joint swelling, neck pain and neck stiffness. Skin: Positive for wound. Negative for color change. Neurological: Negative for dizziness, syncope, weakness, light-headedness, numbness and headaches.    Hematological: Does not bruise/bleed easily.         --------------------------------------------- PAST HISTORY ---------------------------------------------  Past Medical History:   Past Medical History:   Diagnosis Date    Diabetes ([facility] [address]     Hypertension     [facility] 9/6/2018    Renal artery stenosis [facility])        Past Surgical History:   Past Surgical History: Procedure Laterality Date    BLEPHAROPLASTY Bilateral     CARDIAC CATHETERIZATION  10/09/2020    Dr Carlos Adams Right     cataract    HAND SURGERY Bilateral     trigger thumb    HYSTERECTOMY      KNEE SURGERY         Social History:   Social History     Socioeconomic History    Marital status:      Spouse name: None    Number of children: None    Years of education: None    Highest education level: None   Occupational History    None   Tobacco Use    Smoking status: Former Smoker     Packs/day: 0.50     Types: Cigarettes     Quit date: 3/31/2020     Years since quittin.1    Smokeless tobacco: Never Used   Vaping Use    Vaping Use: Never used   Substance and Sexual Activity    Alcohol use: No     Comment: rare    Drug use: No    Sexual activity: Not Currently   Other Topics Concern    None   Social History Narrative    None     Social Determinants of Health     Financial Resource Strain:     Difficulty of Paying Living Expenses: Not on file   Food Insecurity:     Worried About Running Out of Food in the Last Year: Not on file    Elle of Food in the Last Year: Not on file   Transportation Needs:     Lack of Transportation (Medical): Not on file    Lack of Transportation (Non-Medical):  Not on file   Physical Activity:     Days of Exercise per Week: Not on file    Minutes of Exercise per Session: Not on file   Stress:     Feeling of Stress : Not on file   Social Connections:     Frequency of Communication with Friends and Family: Not on file    Frequency of Social Gatherings with Friends and Family: Not on file    Attends Roman [employer] Services: Not on file    Active Member of Clubs or Organizations: Not on file    Attends Club or Organization Meetings: Not on file    Marital Status: Not on file   Intimate Partner Violence:     Fear of Current or Ex-Partner: Not on file    Emotionally Abused: Not on file    Physically Abused: Not on file    Sexually Abused: Not on file   Housing Stability:     Unable to Pay for Housing in the Last Year: Not on file    Number of Places Lived in the Last Year: Not on file    Unstable Housing in the Last Year: Not on file       Family History:   History reviewed. No pertinent family history. The patients home medications have been reviewed. Allergies:   No Known Allergies        ---------------------------------------------------PHYSICAL EXAM--------------------------------------    BP (!) 201/82   Pulse 58   Temp 98.6 °F (37 °C)   Resp 17   SpO2 97%     Physical Exam  Vitals and nursing note reviewed. Constitutional:       General: She is not in acute distress. Appearance: She is not toxic-appearing. HENT:      Head:      Comments: Left posterior cephalohematoma     Mouth/Throat:      Mouth: Mucous membranes are moist.   Eyes:      General: No scleral icterus. Extraocular Movements: Extraocular movements intact. Pupils: Pupils are equal, round, and reactive to light. Cardiovascular:      Rate and Rhythm: Normal rate and regular rhythm. Pulses: Normal pulses. Heart sounds: Normal heart sounds. No murmur heard. Pulmonary:      Effort: Pulmonary effort is normal. No respiratory distress. Breath sounds: Normal breath sounds. No wheezing or rales. Chest:      Chest wall: No tenderness. Abdominal:      General: There is no distension. Palpations: Abdomen is soft. Tenderness: There is no abdominal tenderness. Musculoskeletal:         General: No swelling or tenderness. Normal range of motion. Cervical back: Normal range of motion and neck supple. No tenderness. Comments: Large complex laceration and skin tear to right anterior shin, full thickness. Mild active oozing. Radial, DP, and PT pulses 2+ bilaterally. Skin:     General: Skin is warm and dry.    Neurological:      Mental Status: She is alert and oriented to person, place, COMPLEXITY:    Debridement: full thickness. Undermining: None. Wound Margins Revised: yes. Flaps Aligned: yes. The wound was explored with the following results no foreign body or tendon injury seen. The wound was closed in two layers. The subcutaneous layer was closed with 4-0 Vicryl using interrupted sutures. The skin was closed with 4-0 Ethilon using interrupted sutures. Dressing:  vaseline soaked gauze was placed. Total number suture: #4 x deep layer; #13 x superficial    Counseling: The emergency provider has spoken with the patient and daughter and discussed todays results, in addition to providing specific details for the plan of care and counseling regarding the diagnosis and prognosis. Questions are answered at this time and they are agreeable with the plan. ED Course/Medical Decision Makin y.o. female here with fall from standing. Legs gave out which happens to her frequently due to OA of knees and low back issues. Non-toxic appearing, afebrile, hemodynamically stable, and in no acute distress. Breathing comfortably on room air without respiratory distress. Neurovascularly intact throughout. Tetanus updated. Imaging negative for acute traumatic injury. Lac repaired as above. Missing tissue made it difficulty to approximate and close all of the wound. Left partially open. Given extent of laceration and repair, given abx ppx. After discussion of findings and return precautions, patient agrees with plan for discharge and outpatient follow up with PCP and wound clinic.       --------------------------------- IMPRESSION AND DISPOSITION ---------------------------------    IMPRESSION  1. Leg laceration, right, initial encounter    2. Closed head injury, initial encounter    3. Fall, initial encounter        DISPOSITION  Disposition: Discharge to home  Patient condition is good    NOTE: This report was transcribed using voice recognition software.  Every effort was made to ensure accuracy; however, inadvertent computerized transcription errors may be present    Samantha Mcgee MD  Attending Emergency Physician         Samantha Mcgee MD  05/30/22 [id number]

## 2023-01-01 ENCOUNTER — APPOINTMENT (OUTPATIENT)
Dept: CT IMAGING | Age: 88
DRG: 057 | End: 2023-01-01
Payer: MEDICARE

## 2023-01-01 ENCOUNTER — APPOINTMENT (OUTPATIENT)
Dept: MRI IMAGING | Age: 88
DRG: 057 | End: 2023-01-01
Payer: MEDICARE

## 2023-01-01 ENCOUNTER — APPOINTMENT (OUTPATIENT)
Dept: GENERAL RADIOLOGY | Age: 88
DRG: 057 | End: 2023-01-01
Payer: MEDICARE

## 2023-01-01 ENCOUNTER — HOSPITAL ENCOUNTER (INPATIENT)
Age: 88
LOS: 4 days | Discharge: HOSPICE | DRG: 057 | End: 2023-04-24
Attending: EMERGENCY MEDICINE | Admitting: INTERNAL MEDICINE
Payer: MEDICARE

## 2023-01-01 VITALS
SYSTOLIC BLOOD PRESSURE: 82 MMHG | HEIGHT: 63 IN | OXYGEN SATURATION: 100 % | TEMPERATURE: 97.8 F | HEART RATE: 125 BPM | RESPIRATION RATE: 20 BRPM | DIASTOLIC BLOOD PRESSURE: 48 MMHG | WEIGHT: 153 LBS | BODY MASS INDEX: 27.11 KG/M2
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
## 2023-01-01 DIAGNOSIS — R47.81 SLURRED SPEECH: ICD-10-CM

## 2023-01-01 LAB
ALBUMIN SERPL-MCNC: 3.4 G/DL (ref 3.5–5.2)
ALBUMIN SERPL-MCNC: 3.7 G/DL (ref 3.5–5.2)
ALBUMIN SERPL-MCNC: 4 G/DL (ref 3.5–5.2)
ALP SERPL-CCNC: 147 U/L (ref 35–104)
ALP SERPL-CCNC: 160 U/L (ref 35–104)
ALP SERPL-CCNC: 161 U/L (ref 35–104)
ALT SERPL-CCNC: 19 U/L (ref 0–32)
ALT SERPL-CCNC: 20 U/L (ref 0–32)
ALT SERPL-CCNC: 21 U/L (ref 0–32)
AMMONIA PLAS-SCNC: <10 UMOL/L (ref 11–51)
ANION GAP SERPL CALCULATED.3IONS-SCNC: 11 MMOL/L (ref 7–16)
ANION GAP SERPL CALCULATED.3IONS-SCNC: 12 MMOL/L (ref 7–16)
ANION GAP SERPL CALCULATED.3IONS-SCNC: 14 MMOL/L (ref 7–16)
ANION GAP SERPL CALCULATED.3IONS-SCNC: 17 MMOL/L (ref 7–16)
AST SERPL-CCNC: 24 U/L (ref 0–31)
AST SERPL-CCNC: 24 U/L (ref 0–31)
AST SERPL-CCNC: 26 U/L (ref 0–31)
B.E.: 0.2 MMOL/L (ref -3–3)
BACTERIA UR CULT: NORMAL
BASOPHILS # BLD: 0 E9/L (ref 0–0.2)
BASOPHILS # BLD: 0.02 E9/L (ref 0–0.2)
BASOPHILS # BLD: 0.02 E9/L (ref 0–0.2)
BASOPHILS # BLD: 0.03 E9/L (ref 0–0.2)
BASOPHILS NFR BLD: 0.2 % (ref 0–2)
BASOPHILS NFR BLD: 0.3 % (ref 0–2)
BASOPHILS NFR BLD: 0.3 % (ref 0–2)
BASOPHILS NFR BLD: 0.4 % (ref 0–2)
BILIRUB SERPL-MCNC: 0.4 MG/DL (ref 0–1.2)
BILIRUB SERPL-MCNC: 0.4 MG/DL (ref 0–1.2)
BILIRUB SERPL-MCNC: 0.5 MG/DL (ref 0–1.2)
BILIRUB UR QL STRIP: NEGATIVE
BNP BLD-MCNC: 8006 PG/ML (ref 0–450)
BUN SERPL-MCNC: 14 MG/DL (ref 6–23)
BUN SERPL-MCNC: 14 MG/DL (ref 6–23)
BUN SERPL-MCNC: 24 MG/DL (ref 6–23)
BUN SERPL-MCNC: 32 MG/DL (ref 6–23)
CALCIUM SERPL-MCNC: 8.9 MG/DL (ref 8.6–10.2)
CALCIUM SERPL-MCNC: 9.4 MG/DL (ref 8.6–10.2)
CALCIUM SERPL-MCNC: 9.4 MG/DL (ref 8.6–10.2)
CALCIUM SERPL-MCNC: 9.6 MG/DL (ref 8.6–10.2)
CHLORIDE SERPL-SCNC: 103 MMOL/L (ref 98–107)
CHLORIDE SERPL-SCNC: 104 MMOL/L (ref 98–107)
CHLORIDE SERPL-SCNC: 94 MMOL/L (ref 98–107)
CHLORIDE SERPL-SCNC: 96 MMOL/L (ref 98–107)
CK SERPL-CCNC: 361 U/L (ref 20–180)
CLARITY UR: ABNORMAL
CO2 SERPL-SCNC: 27 MMOL/L (ref 22–29)
CO2 SERPL-SCNC: 31 MMOL/L (ref 22–29)
COHB: 1.6 % (ref 0–1.5)
COLOR UR: YELLOW
CORTIS SERPL-MCNC: 62 MCG/DL (ref 2.68–18.4)
CREAT SERPL-MCNC: 0.6 MG/DL (ref 0.5–1)
CREAT SERPL-MCNC: 0.6 MG/DL (ref 0.5–1)
CREAT SERPL-MCNC: 0.8 MG/DL (ref 0.5–1)
CREAT SERPL-MCNC: 1.1 MG/DL (ref 0.5–1)
CRITICAL: ABNORMAL
DATE ANALYZED: ABNORMAL
DATE OF COLLECTION: ABNORMAL
EKG ATRIAL RATE: 103 BPM
EKG ATRIAL RATE: 97 BPM
EKG P AXIS: 27 DEGREES
EKG P AXIS: 62 DEGREES
EKG P-R INTERVAL: 142 MS
EKG P-R INTERVAL: 168 MS
EKG Q-T INTERVAL: 404 MS
EKG Q-T INTERVAL: 414 MS
EKG QRS DURATION: 136 MS
EKG QRS DURATION: 148 MS
EKG QTC CALCULATION (BAZETT): 513 MS
EKG QTC CALCULATION (BAZETT): 542 MS
EKG R AXIS: -43 DEGREES
EKG R AXIS: -55 DEGREES
EKG T AXIS: -3 DEGREES
EKG T AXIS: -7 DEGREES
EKG VENTRICULAR RATE: 103 BPM
EKG VENTRICULAR RATE: 97 BPM
EOSINOPHIL # BLD: 0 E9/L (ref 0.05–0.5)
EOSINOPHIL # BLD: 0.01 E9/L (ref 0.05–0.5)
EOSINOPHIL # BLD: 0.02 E9/L (ref 0.05–0.5)
EOSINOPHIL # BLD: 0.05 E9/L (ref 0.05–0.5)
EOSINOPHIL NFR BLD: 0 % (ref 0–6)
EOSINOPHIL NFR BLD: 0.1 % (ref 0–6)
EOSINOPHIL NFR BLD: 0.3 % (ref 0–6)
EOSINOPHIL NFR BLD: 0.6 % (ref 0–6)
ERYTHROCYTE [DISTWIDTH] IN BLOOD BY AUTOMATED COUNT: 13.5 FL (ref 11.5–15)
ERYTHROCYTE [DISTWIDTH] IN BLOOD BY AUTOMATED COUNT: 13.5 FL (ref 11.5–15)
ERYTHROCYTE [DISTWIDTH] IN BLOOD BY AUTOMATED COUNT: 13.7 FL (ref 11.5–15)
ERYTHROCYTE [DISTWIDTH] IN BLOOD BY AUTOMATED COUNT: 13.7 FL (ref 11.5–15)
FLUAV RNA RESP QL NAA+PROBE: NOT DETECTED
FLUBV RNA RESP QL NAA+PROBE: NOT DETECTED
GLUCOSE SERPL-MCNC: 104 MG/DL (ref 74–99)
GLUCOSE SERPL-MCNC: 125 MG/DL (ref 74–99)
GLUCOSE SERPL-MCNC: 188 MG/DL (ref 74–99)
GLUCOSE SERPL-MCNC: 213 MG/DL (ref 74–99)
GLUCOSE UR STRIP-MCNC: NEGATIVE MG/DL
HCO3: 28.6 MMOL/L (ref 22–26)
HCT VFR BLD AUTO: 41.1 % (ref 34–48)
HCT VFR BLD AUTO: 41.9 % (ref 34–48)
HCT VFR BLD AUTO: 43.8 % (ref 34–48)
HCT VFR BLD AUTO: 44.2 % (ref 34–48)
HGB BLD-MCNC: 12.8 G/DL (ref 11.5–15.5)
HGB BLD-MCNC: 13 G/DL (ref 11.5–15.5)
HGB BLD-MCNC: 13.5 G/DL (ref 11.5–15.5)
HGB BLD-MCNC: 13.8 G/DL (ref 11.5–15.5)
HGB UR QL STRIP: ABNORMAL
HHB: 11.7 % (ref 0–5)
IGA SERPL-MCNC: 231 MG/DL (ref 70–400)
IMM GRANULOCYTES # BLD: 0.02 E9/L
IMM GRANULOCYTES # BLD: 0.03 E9/L
IMM GRANULOCYTES # BLD: 0.03 E9/L
IMM GRANULOCYTES NFR BLD: 0.2 % (ref 0–5)
IMM GRANULOCYTES NFR BLD: 0.3 % (ref 0–5)
IMM GRANULOCYTES NFR BLD: 0.4 % (ref 0–5)
KETONES UR STRIP-MCNC: ABNORMAL MG/DL
LAB: ABNORMAL
LACTATE BLDV-SCNC: 1 MMOL/L (ref 0.5–2.2)
LEUKOCYTE ESTERASE UR QL STRIP: ABNORMAL
LYMPHOCYTES # BLD: 0.51 E9/L (ref 1.5–4)
LYMPHOCYTES # BLD: 0.6 E9/L (ref 1.5–4)
LYMPHOCYTES # BLD: 0.81 E9/L (ref 1.5–4)
LYMPHOCYTES # BLD: 0.88 E9/L (ref 1.5–4)
LYMPHOCYTES NFR BLD: 10.4 % (ref 20–42)
LYMPHOCYTES NFR BLD: 10.6 % (ref 20–42)
LYMPHOCYTES NFR BLD: 5.3 % (ref 20–42)
LYMPHOCYTES NFR BLD: 6.9 % (ref 20–42)
Lab: ABNORMAL
MAGNESIUM SERPL-MCNC: 1.4 MG/DL (ref 1.6–2.6)
MCH RBC QN AUTO: 28.6 PG (ref 26–35)
MCH RBC QN AUTO: 28.8 PG (ref 26–35)
MCH RBC QN AUTO: 28.9 PG (ref 26–35)
MCH RBC QN AUTO: 29.2 PG (ref 26–35)
MCHC RBC AUTO-ENTMCNC: 30.8 % (ref 32–34.5)
MCHC RBC AUTO-ENTMCNC: 31 % (ref 32–34.5)
MCHC RBC AUTO-ENTMCNC: 31.1 % (ref 32–34.5)
MCHC RBC AUTO-ENTMCNC: 31.2 % (ref 32–34.5)
MCV RBC AUTO: 91.7 FL (ref 80–99.9)
MCV RBC AUTO: 92.9 FL (ref 80–99.9)
MCV RBC AUTO: 93.6 FL (ref 80–99.9)
MCV RBC AUTO: 93.8 FL (ref 80–99.9)
METER GLUCOSE: 113 MG/DL (ref 74–99)
METER GLUCOSE: 117 MG/DL (ref 74–99)
METER GLUCOSE: 120 MG/DL (ref 74–99)
METER GLUCOSE: 138 MG/DL (ref 74–99)
METER GLUCOSE: 176 MG/DL (ref 74–99)
METER GLUCOSE: 183 MG/DL (ref 74–99)
METER GLUCOSE: 93 MG/DL (ref 74–99)
METHB: 0.4 % (ref 0–1.5)
MODE: ABNORMAL
MONOCYTES # BLD: 0.41 E9/L (ref 0.1–0.95)
MONOCYTES # BLD: 0.7 E9/L (ref 0.1–0.95)
MONOCYTES # BLD: 0.71 E9/L (ref 0.1–0.95)
MONOCYTES # BLD: 0.96 E9/L (ref 0.1–0.95)
MONOCYTES NFR BLD: 11.3 % (ref 2–12)
MONOCYTES NFR BLD: 4.4 % (ref 2–12)
MONOCYTES NFR BLD: 8 % (ref 2–12)
MONOCYTES NFR BLD: 9.3 % (ref 2–12)
NEUTROPHILS # BLD: 6.05 E9/L (ref 1.8–7.3)
NEUTROPHILS # BLD: 6.52 E9/L (ref 1.8–7.3)
NEUTROPHILS # BLD: 7.35 E9/L (ref 1.8–7.3)
NEUTROPHILS # BLD: 9.18 E9/L (ref 1.8–7.3)
NEUTS SEG NFR BLD: 77.1 % (ref 43–80)
NEUTS SEG NFR BLD: 79.1 % (ref 43–80)
NEUTS SEG NFR BLD: 84.5 % (ref 43–80)
NEUTS SEG NFR BLD: 90.3 % (ref 43–80)
NITRITE UR QL STRIP: NEGATIVE
O2 CONTENT: 17.5 ML/DL
O2 SATURATION: 88.1 % (ref 92–98.5)
O2HB: 86.3 % (ref 94–97)
OPERATOR ID: 1214
OVALOCYTES: ABNORMAL
PATIENT TEMP: 37 C
PCO2: 62.6 MMHG (ref 35–45)
PH BLOOD GAS: 7.28 (ref 7.35–7.45)
PH UR STRIP: 5.5 [PH] (ref 5–9)
PHOSPHATE SERPL-MCNC: 2.9 MG/DL (ref 2.5–4.5)
PLATELET # BLD AUTO: 189 E9/L (ref 130–450)
PLATELET # BLD AUTO: 193 E9/L (ref 130–450)
PLATELET # BLD AUTO: 197 E9/L (ref 130–450)
PLATELET # BLD AUTO: 215 E9/L (ref 130–450)
PMV BLD AUTO: 10.1 FL (ref 7–12)
PMV BLD AUTO: 10.2 FL (ref 7–12)
PMV BLD AUTO: 9.5 FL (ref 7–12)
PMV BLD AUTO: 9.7 FL (ref 7–12)
PO2: 59.6 MMHG (ref 75–100)
POIKILOCYTES: ABNORMAL
POTASSIUM SERPL-SCNC: 3.43 MMOL/L (ref 3.5–5)
POTASSIUM SERPL-SCNC: 3.5 MMOL/L (ref 3.5–5)
POTASSIUM SERPL-SCNC: 3.8 MMOL/L (ref 3.5–5)
POTASSIUM SERPL-SCNC: 3.8 MMOL/L (ref 3.5–5)
POTASSIUM SERPL-SCNC: 4.5 MMOL/L (ref 3.5–5)
PROT SERPL-MCNC: 6.1 G/DL (ref 6.4–8.3)
PROT SERPL-MCNC: 6.4 G/DL (ref 6.4–8.3)
PROT SERPL-MCNC: 6.9 G/DL (ref 6.4–8.3)
PROT UR STRIP-MCNC: ABNORMAL MG/DL
RBC # BLD AUTO: 4.39 E12/L (ref 3.5–5.5)
RBC # BLD AUTO: 4.51 E12/L (ref 3.5–5.5)
RBC # BLD AUTO: 4.67 E12/L (ref 3.5–5.5)
RBC # BLD AUTO: 4.82 E12/L (ref 3.5–5.5)
SARS-COV-2 RNA RESP QL NAA+PROBE: NOT DETECTED
SODIUM SERPL-SCNC: 137 MMOL/L (ref 132–146)
SODIUM SERPL-SCNC: 140 MMOL/L (ref 132–146)
SODIUM SERPL-SCNC: 142 MMOL/L (ref 132–146)
SODIUM SERPL-SCNC: 144 MMOL/L (ref 132–146)
SOURCE, BLOOD GAS: ABNORMAL
SP GR UR STRIP: 1.02 (ref 1–1.03)
THB: 14.4 G/DL (ref 11.5–16.5)
TIME ANALYZED: 1946
TROPONIN, HIGH SENSITIVITY: 31 NG/L (ref 0–9)
TROPONIN, HIGH SENSITIVITY: 46 NG/L (ref 0–9)
TSH SERPL-MCNC: 0.84 UIU/ML (ref 0.27–4.2)
UROBILINOGEN UR STRIP-ACNC: 0.2 E.U./DL
WBC # BLD: 10.2 E9/L (ref 4.5–11.5)
WBC # BLD: 7.6 E9/L (ref 4.5–11.5)
WBC # BLD: 8.5 E9/L (ref 4.5–11.5)
WBC # BLD: 8.7 E9/L (ref 4.5–11.5)

## 2023-01-01 PROCEDURE — 2500000003 HC RX 250 WO HCPCS: Performed by: INTERNAL MEDICINE

## 2023-01-01 PROCEDURE — 93010 ELECTROCARDIOGRAM REPORT: CPT | Performed by: INTERNAL MEDICINE

## 2023-01-01 PROCEDURE — 99233 SBSQ HOSP IP/OBS HIGH 50: CPT | Performed by: CLINICAL NURSE SPECIALIST

## 2023-01-01 PROCEDURE — 71046 X-RAY EXAM CHEST 2 VIEWS: CPT

## 2023-01-01 PROCEDURE — 82550 ASSAY OF CK (CPK): CPT

## 2023-01-01 PROCEDURE — 2060000000 HC ICU INTERMEDIATE R&B

## 2023-01-01 PROCEDURE — 82962 GLUCOSE BLOOD TEST: CPT

## 2023-01-01 PROCEDURE — 93005 ELECTROCARDIOGRAM TRACING: CPT | Performed by: INTERNAL MEDICINE

## 2023-01-01 PROCEDURE — 82784 ASSAY IGA/IGD/IGG/IGM EACH: CPT

## 2023-01-01 PROCEDURE — 2700000000 HC OXYGEN THERAPY PER DAY

## 2023-01-01 PROCEDURE — 70450 CT HEAD/BRAIN W/O DYE: CPT

## 2023-01-01 PROCEDURE — 80053 COMPREHEN METABOLIC PANEL: CPT

## 2023-01-01 PROCEDURE — 71045 X-RAY EXAM CHEST 1 VIEW: CPT

## 2023-01-01 PROCEDURE — 94660 CPAP INITIATION&MGMT: CPT

## 2023-01-01 PROCEDURE — 72125 CT NECK SPINE W/O DYE: CPT

## 2023-01-01 PROCEDURE — 6370000000 HC RX 637 (ALT 250 FOR IP): Performed by: EMERGENCY MEDICINE

## 2023-01-01 PROCEDURE — 81001 URINALYSIS AUTO W/SCOPE: CPT

## 2023-01-01 PROCEDURE — 70551 MRI BRAIN STEM W/O DYE: CPT

## 2023-01-01 PROCEDURE — 93005 ELECTROCARDIOGRAM TRACING: CPT | Performed by: PHYSICIAN ASSISTANT

## 2023-01-01 PROCEDURE — 85025 COMPLETE CBC W/AUTO DIFF WBC: CPT

## 2023-01-01 PROCEDURE — 6360000002 HC RX W HCPCS: Performed by: INTERNAL MEDICINE

## 2023-01-01 PROCEDURE — 82533 TOTAL CORTISOL: CPT

## 2023-01-01 PROCEDURE — 87636 SARSCOV2 & INF A&B AMP PRB: CPT

## 2023-01-01 PROCEDURE — 6360000002 HC RX W HCPCS: Performed by: CLINICAL NURSE SPECIALIST

## 2023-01-01 PROCEDURE — 84484 ASSAY OF TROPONIN QUANT: CPT

## 2023-01-01 PROCEDURE — 6370000000 HC RX 637 (ALT 250 FOR IP): Performed by: INTERNAL MEDICINE

## 2023-01-01 PROCEDURE — 87088 URINE BACTERIA CULTURE: CPT

## 2023-01-01 PROCEDURE — 83735 ASSAY OF MAGNESIUM: CPT

## 2023-01-01 PROCEDURE — 36415 COLL VENOUS BLD VENIPUNCTURE: CPT

## 2023-01-01 PROCEDURE — 2580000003 HC RX 258: Performed by: INTERNAL MEDICINE

## 2023-01-01 PROCEDURE — 84132 ASSAY OF SERUM POTASSIUM: CPT

## 2023-01-01 PROCEDURE — 2580000003 HC RX 258: Performed by: EMERGENCY MEDICINE

## 2023-01-01 PROCEDURE — 99285 EMERGENCY DEPT VISIT HI MDM: CPT

## 2023-01-01 PROCEDURE — 83605 ASSAY OF LACTIC ACID: CPT

## 2023-01-01 PROCEDURE — 84443 ASSAY THYROID STIM HORMONE: CPT

## 2023-01-01 PROCEDURE — 80048 BASIC METABOLIC PNL TOTAL CA: CPT

## 2023-01-01 PROCEDURE — 84100 ASSAY OF PHOSPHORUS: CPT

## 2023-01-01 PROCEDURE — 82805 BLOOD GASES W/O2 SATURATION: CPT

## 2023-01-01 PROCEDURE — 82140 ASSAY OF AMMONIA: CPT

## 2023-01-01 PROCEDURE — 92523 SPEECH SOUND LANG COMPREHEN: CPT

## 2023-01-01 PROCEDURE — 83880 ASSAY OF NATRIURETIC PEPTIDE: CPT

## 2023-01-01 RX ORDER — METOPROLOL TARTRATE 50 MG/1
50 TABLET, FILM COATED ORAL 2 TIMES DAILY
Status: DISCONTINUED | OUTPATIENT
Start: 2023-01-01 | End: 2023-01-01

## 2023-01-01 RX ORDER — 0.9 % SODIUM CHLORIDE 0.9 %
1000 INTRAVENOUS SOLUTION INTRAVENOUS ONCE
Status: COMPLETED | OUTPATIENT
Start: 2023-01-01 | End: 2023-01-01

## 2023-01-01 RX ORDER — GLYCOPYRROLATE 0.2 MG/ML
0.2 INJECTION INTRAMUSCULAR; INTRAVENOUS EVERY 4 HOURS PRN
Status: DISCONTINUED | OUTPATIENT
Start: 2023-01-01 | End: 2023-01-01 | Stop reason: HOSPADM

## 2023-01-01 RX ORDER — DOXAZOSIN 2 MG/1
2 TABLET ORAL NIGHTLY
Status: ON HOLD | COMMUNITY
End: 2023-01-01 | Stop reason: HOSPADM

## 2023-01-01 RX ORDER — DEXTROSE AND SODIUM CHLORIDE 5; .9 G/100ML; G/100ML
INJECTION, SOLUTION INTRAVENOUS CONTINUOUS
Status: DISCONTINUED | OUTPATIENT
Start: 2023-01-01 | End: 2023-01-01

## 2023-01-01 RX ORDER — ACETAMINOPHEN 500 MG
500 TABLET ORAL EVERY 4 HOURS PRN
Status: DISCONTINUED | OUTPATIENT
Start: 2023-01-01 | End: 2023-01-01

## 2023-01-01 RX ORDER — HYDRALAZINE HYDROCHLORIDE 20 MG/ML
10 INJECTION INTRAMUSCULAR; INTRAVENOUS EVERY 4 HOURS PRN
Status: DISCONTINUED | OUTPATIENT
Start: 2023-01-01 | End: 2023-01-01

## 2023-01-01 RX ORDER — LORAZEPAM 2 MG/ML
1 INJECTION INTRAMUSCULAR ONCE
Status: COMPLETED | OUTPATIENT
Start: 2023-01-01 | End: 2023-01-01

## 2023-01-01 RX ORDER — NEOMYCIN SULFATE, POLYMYXIN B SULFATE AND GRAMICIDIN 1.75; 10000; .025 MG/ML; [USP'U]/ML; MG/ML
1 SOLUTION/ DROPS OPHTHALMIC 4 TIMES DAILY
Status: DISCONTINUED | OUTPATIENT
Start: 2023-01-01 | End: 2023-01-01

## 2023-01-01 RX ORDER — LABETALOL HYDROCHLORIDE 5 MG/ML
10 INJECTION, SOLUTION INTRAVENOUS EVERY 4 HOURS PRN
Status: DISCONTINUED | OUTPATIENT
Start: 2023-01-01 | End: 2023-01-01

## 2023-01-01 RX ORDER — PANTOPRAZOLE SODIUM 40 MG/1
40 TABLET, DELAYED RELEASE ORAL
Status: DISCONTINUED | OUTPATIENT
Start: 2023-01-01 | End: 2023-01-01

## 2023-01-01 RX ORDER — LANOLIN ALCOHOL/MO/W.PET/CERES
3 CREAM (GRAM) TOPICAL NIGHTLY PRN
Status: DISCONTINUED | OUTPATIENT
Start: 2023-01-01 | End: 2023-01-01

## 2023-01-01 RX ORDER — AMLODIPINE BESYLATE 5 MG/1
5 TABLET ORAL DAILY
Status: DISCONTINUED | OUTPATIENT
Start: 2023-01-01 | End: 2023-01-01

## 2023-01-01 RX ORDER — EZETIMIBE 10 MG/1
10 TABLET ORAL NIGHTLY
Status: DISCONTINUED | OUTPATIENT
Start: 2023-01-01 | End: 2023-01-01

## 2023-01-01 RX ORDER — CETIRIZINE HYDROCHLORIDE 10 MG/1
5 TABLET ORAL DAILY
Status: DISCONTINUED | OUTPATIENT
Start: 2023-01-01 | End: 2023-01-01

## 2023-01-01 RX ORDER — POLYETHYLENE GLYCOL 3350 17 G/17G
17 POWDER, FOR SOLUTION ORAL DAILY
Status: DISCONTINUED | OUTPATIENT
Start: 2023-01-01 | End: 2023-01-01 | Stop reason: HOSPADM

## 2023-01-01 RX ORDER — ASPIRIN 81 MG/1
81 TABLET, CHEWABLE ORAL DAILY
Status: DISCONTINUED | OUTPATIENT
Start: 2023-01-01 | End: 2023-01-01

## 2023-01-01 RX ORDER — DIMETHICONE, OXYBENZONE, AND PADIMATE O 2; 2.5; 6.6 G/100G; G/100G; G/100G
STICK TOPICAL PRN
Status: DISCONTINUED | OUTPATIENT
Start: 2023-01-01 | End: 2023-01-01 | Stop reason: HOSPADM

## 2023-01-01 RX ORDER — DEXTROSE MONOHYDRATE 100 MG/ML
INJECTION, SOLUTION INTRAVENOUS CONTINUOUS PRN
Status: DISCONTINUED | OUTPATIENT
Start: 2023-01-01 | End: 2023-01-01

## 2023-01-01 RX ORDER — INSULIN LISPRO 100 [IU]/ML
0-8 INJECTION, SOLUTION INTRAVENOUS; SUBCUTANEOUS
Status: DISCONTINUED | OUTPATIENT
Start: 2023-01-01 | End: 2023-01-01

## 2023-01-01 RX ORDER — ONDANSETRON 2 MG/ML
4 INJECTION INTRAMUSCULAR; INTRAVENOUS EVERY 6 HOURS PRN
Status: DISCONTINUED | OUTPATIENT
Start: 2023-01-01 | End: 2023-01-01 | Stop reason: CLARIF

## 2023-01-01 RX ORDER — LORAZEPAM 2 MG/ML
1 INJECTION INTRAMUSCULAR EVERY 6 HOURS PRN
Status: DISCONTINUED | OUTPATIENT
Start: 2023-01-01 | End: 2023-01-01 | Stop reason: HOSPADM

## 2023-01-01 RX ORDER — MORPHINE SULFATE 4 MG/ML
4 INJECTION, SOLUTION INTRAMUSCULAR; INTRAVENOUS
Status: DISCONTINUED | OUTPATIENT
Start: 2023-01-01 | End: 2023-01-01 | Stop reason: HOSPADM

## 2023-01-01 RX ORDER — TRAMADOL HYDROCHLORIDE 50 MG/1
37.5 TABLET ORAL EVERY 6 HOURS PRN
Status: ON HOLD | COMMUNITY
End: 2023-01-01 | Stop reason: HOSPADM

## 2023-01-01 RX ORDER — ACETAMINOPHEN 650 MG/1
650 SUPPOSITORY RECTAL EVERY 4 HOURS PRN
Status: DISCONTINUED | OUTPATIENT
Start: 2023-01-01 | End: 2023-01-01 | Stop reason: HOSPADM

## 2023-01-01 RX ORDER — CLOPIDOGREL BISULFATE 75 MG/1
75 TABLET ORAL DAILY
Status: DISCONTINUED | OUTPATIENT
Start: 2023-01-01 | End: 2023-01-01

## 2023-01-01 RX ORDER — ONDANSETRON 2 MG/ML
4 INJECTION INTRAMUSCULAR; INTRAVENOUS EVERY 6 HOURS PRN
Status: DISCONTINUED | OUTPATIENT
Start: 2023-01-01 | End: 2023-01-01

## 2023-01-01 RX ORDER — ATORVASTATIN CALCIUM 10 MG/1
10 TABLET, FILM COATED ORAL DAILY
Status: DISCONTINUED | OUTPATIENT
Start: 2023-01-01 | End: 2023-01-01

## 2023-01-01 RX ORDER — MORPHINE SULFATE 2 MG/ML
1 INJECTION, SOLUTION INTRAMUSCULAR; INTRAVENOUS EVERY 4 HOURS PRN
Status: DISCONTINUED | OUTPATIENT
Start: 2023-01-01 | End: 2023-01-01

## 2023-01-01 RX ORDER — LOSARTAN POTASSIUM 50 MG/1
100 TABLET ORAL DAILY
Status: DISCONTINUED | OUTPATIENT
Start: 2023-01-01 | End: 2023-01-01

## 2023-01-01 RX ORDER — INSULIN LISPRO 100 [IU]/ML
0-4 INJECTION, SOLUTION INTRAVENOUS; SUBCUTANEOUS NIGHTLY
Status: DISCONTINUED | OUTPATIENT
Start: 2023-01-01 | End: 2023-01-01

## 2023-01-01 RX ORDER — HYDROCHLOROTHIAZIDE 25 MG/1
25 TABLET ORAL DAILY
Status: ON HOLD | COMMUNITY
End: 2023-01-01 | Stop reason: HOSPADM

## 2023-01-01 RX ORDER — ASPIRIN 81 MG/1
324 TABLET, CHEWABLE ORAL ONCE
Status: DISCONTINUED | OUTPATIENT
Start: 2023-01-01 | End: 2023-01-01

## 2023-01-01 RX ORDER — MORPHINE SULFATE 2 MG/ML
2 INJECTION, SOLUTION INTRAMUSCULAR; INTRAVENOUS
Status: DISCONTINUED | OUTPATIENT
Start: 2023-01-01 | End: 2023-01-01 | Stop reason: HOSPADM

## 2023-01-01 RX ADMIN — LABETALOL HYDROCHLORIDE 10 MG: 5 INJECTION INTRAVENOUS at 21:27

## 2023-01-01 RX ADMIN — LABETALOL HYDROCHLORIDE 10 MG: 5 INJECTION INTRAVENOUS at 00:57

## 2023-01-01 RX ADMIN — ANORECTAL OINTMENT: 15.7; .44; 24; 20.6 OINTMENT TOPICAL at 21:29

## 2023-01-01 RX ADMIN — MORPHINE SULFATE 2 MG: 2 INJECTION, SOLUTION INTRAMUSCULAR; INTRAVENOUS at 16:23

## 2023-01-01 RX ADMIN — LABETALOL HYDROCHLORIDE 10 MG: 5 INJECTION INTRAVENOUS at 06:28

## 2023-01-01 RX ADMIN — NEOMYCIN SULFATE, POLYMYXIN B SULFATE AND GRAMICIDIN 1 DROP: 1.75; 10000; .025 SOLUTION/ DROPS OPHTHALMIC at 11:27

## 2023-01-01 RX ADMIN — DEXTROSE AND SODIUM CHLORIDE: 5; 900 INJECTION, SOLUTION INTRAVENOUS at 09:52

## 2023-01-01 RX ADMIN — SODIUM CHLORIDE 1000 ML: 9 INJECTION, SOLUTION INTRAVENOUS at 12:10

## 2023-01-01 RX ADMIN — LORAZEPAM 1 MG: 2 INJECTION INTRAMUSCULAR; INTRAVENOUS at 13:01

## 2023-01-01 RX ADMIN — LORAZEPAM 1 MG: 2 INJECTION INTRAMUSCULAR; INTRAVENOUS at 11:30

## 2023-01-01 RX ADMIN — MORPHINE SULFATE 2 MG: 2 INJECTION, SOLUTION INTRAMUSCULAR; INTRAVENOUS at 10:55

## 2023-01-01 RX ADMIN — IMMUNE GLOBULIN (HUMAN) 10 G: 10 INJECTION INTRAVENOUS; SUBCUTANEOUS at 10:55

## 2023-01-01 RX ADMIN — MORPHINE SULFATE 2 MG: 2 INJECTION, SOLUTION INTRAMUSCULAR; INTRAVENOUS at 00:08

## 2023-01-01 RX ADMIN — HYDRALAZINE HYDROCHLORIDE 10 MG: 20 INJECTION INTRAMUSCULAR; INTRAVENOUS at 11:21

## 2023-01-01 RX ADMIN — MORPHINE SULFATE 2 MG: 2 INJECTION, SOLUTION INTRAMUSCULAR; INTRAVENOUS at 06:19

## 2023-01-01 RX ADMIN — MORPHINE SULFATE 1 MG: 2 INJECTION, SOLUTION INTRAMUSCULAR; INTRAVENOUS at 04:44

## 2023-01-01 RX ADMIN — MORPHINE SULFATE 1 MG: 2 INJECTION, SOLUTION INTRAMUSCULAR; INTRAVENOUS at 09:49

## 2023-01-01 RX ADMIN — NEOMYCIN SULFATE, POLYMYXIN B SULFATE AND GRAMICIDIN 1 DROP: 1.75; 10000; .025 SOLUTION/ DROPS OPHTHALMIC at 16:19

## 2023-01-01 RX ADMIN — NEOMYCIN SULFATE, POLYMYXIN B SULFATE AND GRAMICIDIN 1 DROP: 1.75; 10000; .025 SOLUTION/ DROPS OPHTHALMIC at 21:27

## 2023-01-01 RX ADMIN — LABETALOL HYDROCHLORIDE 10 MG: 5 INJECTION INTRAVENOUS at 09:49

## 2023-01-01 RX ADMIN — MORPHINE SULFATE 2 MG: 2 INJECTION, SOLUTION INTRAMUSCULAR; INTRAVENOUS at 02:42

## 2023-01-01 RX ADMIN — MORPHINE SULFATE 2 MG: 2 INJECTION, SOLUTION INTRAMUSCULAR; INTRAVENOUS at 03:04

## 2023-01-01 RX ADMIN — HYDRALAZINE HYDROCHLORIDE 10 MG: 20 INJECTION INTRAMUSCULAR; INTRAVENOUS at 15:05

## 2023-01-01 RX ADMIN — MICONAZOLE NITRATE: 20.6 POWDER TOPICAL at 21:29

## 2023-01-01 RX ADMIN — MORPHINE SULFATE 2 MG: 2 INJECTION, SOLUTION INTRAMUSCULAR; INTRAVENOUS at 23:21

## 2023-01-01 RX ADMIN — NEOMYCIN SULFATE, POLYMYXIN B SULFATE AND GRAMICIDIN 1 DROP: 1.75; 10000; .025 SOLUTION/ DROPS OPHTHALMIC at 14:24

## 2023-01-01 ASSESSMENT — PAIN SCALES - GENERAL
PAINLEVEL_OUTOF10: 7
PAINLEVEL_OUTOF10: 0
PAINLEVEL_OUTOF10: 10
PAINLEVEL_OUTOF10: 0

## 2023-01-01 ASSESSMENT — LIFESTYLE VARIABLES
HOW MANY STANDARD DRINKS CONTAINING ALCOHOL DO YOU HAVE ON A TYPICAL DAY: PATIENT DOES NOT DRINK
HOW OFTEN DO YOU HAVE A DRINK CONTAINING ALCOHOL: NEVER

## 2023-01-01 ASSESSMENT — PAIN DESCRIPTION - LOCATION: LOCATION: BACK

## 2023-01-01 ASSESSMENT — PAIN DESCRIPTION - DESCRIPTORS: DESCRIPTORS: ACHING;DISCOMFORT

## 2023-01-01 ASSESSMENT — PAIN DESCRIPTION - ORIENTATION: ORIENTATION: POSTERIOR

## 2023-01-01 ASSESSMENT — PAIN - FUNCTIONAL ASSESSMENT: PAIN_FUNCTIONAL_ASSESSMENT: NONE - DENIES PAIN

## 2023-03-01 ENCOUNTER — HOSPITAL ENCOUNTER (INPATIENT)
Age: 88
LOS: 4 days | Discharge: HOME | DRG: 065 | End: 2023-03-06
Attending: EMERGENCY MEDICINE | Admitting: INTERNAL MEDICINE
Payer: MEDICARE

## 2023-03-01 ENCOUNTER — APPOINTMENT (OUTPATIENT)
Dept: CT IMAGING | Age: 88
DRG: 065 | End: 2023-03-01
Payer: MEDICARE

## 2023-03-01 ENCOUNTER — APPOINTMENT (OUTPATIENT)
Dept: GENERAL RADIOLOGY | Age: 88
DRG: 065 | End: 2023-03-01
Payer: MEDICARE

## 2023-03-01 DIAGNOSIS — R20.0 LIP NUMBNESS: ICD-10-CM

## 2023-03-01 DIAGNOSIS — R29.898 WEAKNESS OF BOTH UPPER EXTREMITIES: Primary | ICD-10-CM

## 2023-03-01 LAB
ALBUMIN SERPL-MCNC: 4.2 G/DL (ref 3.5–5.2)
ALP BLD-CCNC: 176 U/L (ref 35–104)
ALT SERPL-CCNC: 14 U/L (ref 0–32)
ANION GAP SERPL CALCULATED.3IONS-SCNC: 11 MMOL/L (ref 7–16)
AST SERPL-CCNC: 17 U/L (ref 0–31)
BASOPHILS ABSOLUTE: 0.03 E9/L (ref 0–0.2)
BASOPHILS RELATIVE PERCENT: 0.5 % (ref 0–2)
BILIRUB SERPL-MCNC: 0.3 MG/DL (ref 0–1.2)
BUN BLDV-MCNC: 26 MG/DL (ref 6–23)
CALCIUM SERPL-MCNC: 9.8 MG/DL (ref 8.6–10.2)
CHLORIDE BLD-SCNC: 102 MMOL/L (ref 98–107)
CHP ED QC CHECK: YES
CO2: 28 MMOL/L (ref 22–29)
CREAT SERPL-MCNC: 1 MG/DL (ref 0.5–1)
D DIMER: 350 NG/ML DDU
EOSINOPHILS ABSOLUTE: 0.09 E9/L (ref 0.05–0.5)
EOSINOPHILS RELATIVE PERCENT: 1.4 % (ref 0–6)
GFR SERPL CREATININE-BSD FRML MDRD: 54 ML/MIN/1.73
GLUCOSE BLD-MCNC: 106 MG/DL (ref 74–99)
GLUCOSE BLD-MCNC: 93 MG/DL
HCT VFR BLD CALC: 44.5 % (ref 34–48)
HEMOGLOBIN: 14 G/DL (ref 11.5–15.5)
IMMATURE GRANULOCYTES #: 0.01 E9/L
IMMATURE GRANULOCYTES %: 0.2 % (ref 0–5)
INR BLD: 1.1
LYMPHOCYTES ABSOLUTE: 1.62 E9/L (ref 1.5–4)
LYMPHOCYTES RELATIVE PERCENT: 26.1 % (ref 20–42)
MAGNESIUM: 2 MG/DL (ref 1.6–2.6)
MCH RBC QN AUTO: 28.5 PG (ref 26–35)
MCHC RBC AUTO-ENTMCNC: 31.5 % (ref 32–34.5)
MCV RBC AUTO: 90.4 FL (ref 80–99.9)
METER GLUCOSE: 93 MG/DL (ref 74–99)
MONOCYTES ABSOLUTE: 0.63 E9/L (ref 0.1–0.95)
MONOCYTES RELATIVE PERCENT: 10.1 % (ref 2–12)
NEUTROPHILS ABSOLUTE: 3.83 E9/L (ref 1.8–7.3)
NEUTROPHILS RELATIVE PERCENT: 61.7 % (ref 43–80)
PDW BLD-RTO: 13.1 FL (ref 11.5–15)
PLATELET # BLD: 200 E9/L (ref 130–450)
PMV BLD AUTO: 9.8 FL (ref 7–12)
POTASSIUM SERPL-SCNC: 4.4 MMOL/L (ref 3.5–5)
PROTHROMBIN TIME: 12.5 SEC (ref 9.3–12.4)
RBC # BLD: 4.92 E12/L (ref 3.5–5.5)
SODIUM BLD-SCNC: 141 MMOL/L (ref 132–146)
TOTAL PROTEIN: 7 G/DL (ref 6.4–8.3)
TROPONIN, HIGH SENSITIVITY: 22 NG/L (ref 0–9)
TROPONIN, HIGH SENSITIVITY: 24 NG/L (ref 0–9)
TSH SERPL DL<=0.05 MIU/L-ACNC: 0.03 UIU/ML (ref 0.27–4.2)
WBC # BLD: 6.2 E9/L (ref 4.5–11.5)

## 2023-03-01 PROCEDURE — 85378 FIBRIN DEGRADE SEMIQUANT: CPT

## 2023-03-01 PROCEDURE — 85610 PROTHROMBIN TIME: CPT

## 2023-03-01 PROCEDURE — 84443 ASSAY THYROID STIM HORMONE: CPT

## 2023-03-01 PROCEDURE — 83735 ASSAY OF MAGNESIUM: CPT

## 2023-03-01 PROCEDURE — 93005 ELECTROCARDIOGRAM TRACING: CPT

## 2023-03-01 PROCEDURE — 96375 TX/PRO/DX INJ NEW DRUG ADDON: CPT

## 2023-03-01 PROCEDURE — 70450 CT HEAD/BRAIN W/O DYE: CPT

## 2023-03-01 PROCEDURE — 84484 ASSAY OF TROPONIN QUANT: CPT

## 2023-03-01 PROCEDURE — 85025 COMPLETE CBC W/AUTO DIFF WBC: CPT

## 2023-03-01 PROCEDURE — 80053 COMPREHEN METABOLIC PANEL: CPT

## 2023-03-01 PROCEDURE — 2500000003 HC RX 250 WO HCPCS: Performed by: EMERGENCY MEDICINE

## 2023-03-01 PROCEDURE — 6360000002 HC RX W HCPCS

## 2023-03-01 PROCEDURE — 82962 GLUCOSE BLOOD TEST: CPT

## 2023-03-01 PROCEDURE — 99285 EMERGENCY DEPT VISIT HI MDM: CPT

## 2023-03-01 PROCEDURE — 96374 THER/PROPH/DIAG INJ IV PUSH: CPT

## 2023-03-01 PROCEDURE — 71045 X-RAY EXAM CHEST 1 VIEW: CPT

## 2023-03-01 PROCEDURE — 36415 COLL VENOUS BLD VENIPUNCTURE: CPT

## 2023-03-01 RX ORDER — HYDRALAZINE HYDROCHLORIDE 20 MG/ML
10 INJECTION INTRAMUSCULAR; INTRAVENOUS ONCE
Status: COMPLETED | OUTPATIENT
Start: 2023-03-01 | End: 2023-03-01

## 2023-03-01 RX ORDER — LABETALOL HYDROCHLORIDE 5 MG/ML
10 INJECTION, SOLUTION INTRAVENOUS ONCE
Status: COMPLETED | OUTPATIENT
Start: 2023-03-01 | End: 2023-03-01

## 2023-03-01 RX ADMIN — HYDRALAZINE HYDROCHLORIDE 10 MG: 20 INJECTION INTRAMUSCULAR; INTRAVENOUS at 16:42

## 2023-03-01 RX ADMIN — LABETALOL HYDROCHLORIDE 10 MG: 5 INJECTION INTRAVENOUS at 18:51

## 2023-03-01 ASSESSMENT — PAIN - FUNCTIONAL ASSESSMENT: PAIN_FUNCTIONAL_ASSESSMENT: NONE - DENIES PAIN

## 2023-03-01 NOTE — ED PROVIDER NOTES
Leslee Ling is a 80 y.o. female    HPI  Leslee Ling is a 80 y.o. female presenting to the ED for Cerebrovascular Accident (Slurred speech, feels weak, onset yesterday or day before)    History comes primarily from the patient and Family. Patient presents to the emergency department for numbness of her lips, concern for slurred speech and weakness in her  strength. Patient first noticed the numbness in her lips last night when she was eating dinner and it has persisted until now. The patient also noticed weakness in her , as it felt like she could not hold her medication bottle earlier today. There is also a concern for her speech being slightly slurred. Patient is not on any anticoagulation. She does have some dyspnea on exertion, but that seems roughly baseline for her. She is not having any chest pain, abdominal pain, headache, changes in vision, lightheadedness or dizziness, fever, bowel or urinary symptoms. ROS  Full review of systems completed. Pertinent positives and negatives per the HPI, unless otherwise stated ROS is negative. Physical Exam  Vitals and nursing note reviewed. Constitutional:       General: She is not in acute distress. Appearance: Normal appearance. She is not ill-appearing. HENT:      Head: Normocephalic and atraumatic. Right Ear: External ear normal.      Left Ear: External ear normal.      Nose: Nose normal. No rhinorrhea. Mouth/Throat:      Mouth: Mucous membranes are moist.      Pharynx: Oropharynx is clear. Eyes:      Extraocular Movements: Extraocular movements intact. Conjunctiva/sclera: Conjunctivae normal.      Pupils: Pupils are equal, round, and reactive to light. Cardiovascular:      Rate and Rhythm: Normal rate and regular rhythm. Pulses: Normal pulses. Heart sounds: Normal heart sounds. No murmur heard. Pulmonary:      Effort: Pulmonary effort is normal. No respiratory distress.       Breath sounds: Normal breath sounds. No wheezing. Abdominal:      General: Bowel sounds are normal. There is no distension. Palpations: Abdomen is soft. Tenderness: There is no abdominal tenderness. Musculoskeletal:         General: No swelling or deformity. Normal range of motion. Cervical back: Normal range of motion and neck supple. No rigidity. Right lower leg: Edema (mild) present. Left lower leg: Edema (mild) present. Skin:     General: Skin is warm and dry. Coloration: Skin is not jaundiced or pale. Neurological:      General: No focal deficit present. Mental Status: She is alert and oriented to person, place, and time. Mental status is at baseline. Motor: No weakness. Psychiatric:         Mood and Affect: Mood normal.         Behavior: Behavior normal.          Medical Decision Making/Differential Diagnosis:    CC/HPI Summary, social determinants of health, records reviewed, DDX, testing done/not done, ED course, reassessment, disposition considerations/shared decision making with patient, consults, disposition:    Medical Decision Making  Amount and/or Complexity of Data Reviewed  Independent Historian:      Details: Adult child  Labs: ordered. Decision-making details documented in ED Course. Radiology: ordered and independent interpretation performed. Decision-making details documented in ED Course. ECG/medicine tests: ordered and independent interpretation performed. Decision-making details documented in ED Course. Risk  Prescription drug management. Decision regarding hospitalization. EKG is ordered to have documentation of patient's current rhythm, and to rule out any obvious acute cardiac illnesses such as ACS. Additionally, QT interval may be of use in decision making regarding any medications administered here in the ED. POCT glucose ordered to rule out acute hyperglycemia or hypoglycemia as a cause of symptoms.  CBC is ordered to evaluate for any signs of infection or inflammation by obtaining a WBC count, or any signs of acute anemia by interpreting hemoglobin. CMP was ordered to evaluate for any electrolyte imbalances, kidney function, or any elevations in anion gap. Troponin ordered to evaluate for possible cardiac etiology of symptoms including but not limited to STEMI or NSTEMI. Protime-INR ordered in case hemorrhages are found on imaging that requires anticoagulation reversal or surgical intervention. Thyroid studies ordered to evaluate for hyperthyroidism or hypothyroidism. BNP ordered to evaluate for possible cardiac failure or worsening cardiac function. CT head without contrast was ordered to evaluate for acute or chronic intracranial hemorrhage or masses. Chest x-ray is ordered to evaluate for any possible signs of pneumonia, pleural effusions, cardiomegaly, pneumothorax, atelectasis, rib or sternal abnormalities including fractures. Labs are independently interpreted by me. Point-of-care glucose, CBC were normal.  D-dimer was elevated. Alk phos was 176, BUN was 26 which was baseline for the patient. Her prothrombin time was 12.5, only mildly elevated. TSH was significantly decreased possibly contributing to her symptoms. Patient was treated with hydralazine and labetalol due to her's significantly elevated blood pressure. Chest x-ray and CT head were normal.  EKG was not normal but was stable compared to previous. I do not think that the patient is having a stroke. Her NIHSS score was 0 and I did not detect any significant weakness differences between her right and left upper extremities but rather they were symmetric. The patient did have a significantly elevated blood pressure, possibly related to her very low TSH. Given what is going on, the patient will need to be transferred. Patient be transferred to a higher level of care and admitted for further management and care. EKG interpretation:   This EKG is signed and interpreted by me.    Rate: 75  Rhythm: Sinus  Axis: left  Interpretation: Right bundle branch block. No ST elevation is noted, QRS is widened, QTc is 495. Comparison: stable as compared to patient's most recent EKG    RADIOLOGY:   Non-plain film images such as CT, Ultrasound and MRI are read by the radiologist. Plain radiographic images are visualized and preliminarily interpreted by myself with the below findings: There is no focal consolidation, pneumothorax or bony abnormalities. Past medical history/chonic conditions affecting care   has a past medical history of Diabetes ([facility] [address] Hypertension, Left renal artery stenosis ([facility] [address] (2018), and Renal artery stenosis ([facility] [address] Social History     Tobacco Use    Smoking status: Former     Packs/day: 0.50     Types: Cigarettes     Quit date: 3/31/2020     Years since quittin.9    Smokeless tobacco: Never   Vaping Use    Vaping Use: Never used   Substance Use Topics    Alcohol use: No     Comment: rare    Drug use: No         As interpreted by me, the below displayed labs are abnormal. All other labs obtained during this visit were within normal range or not returned as of this dictation.   Labs Reviewed   CBC WITH AUTO DIFFERENTIAL - Abnormal; Notable for the following components:       Result Value    MCHC 31.5 (*)     All other components within normal limits   TROPONIN - Abnormal; Notable for the following components:    Troponin, High Sensitivity 24 (*)     All other components within normal limits   COMPREHENSIVE METABOLIC PANEL - Abnormal; Notable for the following components:    Glucose 106 (*)     BUN 26 (*)     Alkaline Phosphatase 176 (*)     All other components within normal limits   TSH - Abnormal; Notable for the following components:    TSH 0.032 (*)     All other components within normal limits   PROTIME-INR - Abnormal; Notable for the following components:    Protime 12.5 (*)     All other components within normal limits   TROPONIN - Abnormal; Notable for the following components:    Troponin, High Sensitivity 22 (*)     All other components within normal limits   POCT GLUCOSE - Abnormal; Notable for the following components:    Meter Glucose 102 (*)     All other components within normal limits   POCT GLUCOSE - Abnormal; Notable for the following components:    Meter Glucose 103 (*)     All other components within normal limits   POCT GLUCOSE - Abnormal; Notable for the following components:    Meter Glucose 191 (*)     All other components within normal limits   POCT GLUCOSE - Abnormal; Notable for the following components:    Meter Glucose 163 (*)     All other components within normal limits   POCT GLUCOSE - Abnormal; Notable for the following components:    Meter Glucose 155 (*)     All other components within normal limits   POCT GLUCOSE - Normal   MAGNESIUM   D-DIMER, QUANTITATIVE   POCT GLUCOSE       Interpretation per the Radiologist below, if available at the time of this note:  XR CHEST PORTABLE   Final Result   No acute process. CT HEAD WO CONTRAST   Final Result   No acute intracranial abnormality. Stable diffuse volume loss and chronic small vessel ischemic white matter   change. MRI BRAIN WO CONTRAST    (Results Pending)     No results found. No results found.       Emergency Department Course  Vitals:    Vitals:    03/02/23 1313 03/02/23 1542 03/02/23 1715 03/02/23 1925   BP:  139/64  (!) 147/86   Pulse:  79  68   Resp: 19 20  16   Temp:  97.8 °F (36.6 °C)  97.6 °F (36.4 °C)   TempSrc:  Oral  Temporal   SpO2:  93% 96% 97%   Weight:       Height:           Patient was given the following medications:  Medications   atorvastatin (LIPITOR) tablet 10 mg (10 mg Oral Given 3/2/23 0841)   ezetimibe (ZETIA) tablet 10 mg (10 mg Oral Given 3/2/23 2003)   cetirizine (ZYRTEC) tablet 5 mg (5 mg Oral Given 3/2/23 0846)   losartan (COZAAR) tablet 100 mg (100 mg Oral Given 3/2/23 0841)   metFORMIN (GLUCOPHAGE) tablet 500 mg (500 mg Oral Given 3/2/23 1629)   metoprolol tartrate (LOPRESSOR) tablet 50 mg (50 mg Oral Given 3/2/23 2003)   pantoprazole (PROTONIX) tablet 40 mg (40 mg Oral Given 3/2/23 0841)   sertraline (ZOLOFT) tablet 100 mg (100 mg Oral Given 3/2/23 0841)   acetaminophen (TYLENOL) tablet 500 mg (has no administration in time range)   melatonin tablet 3 mg (has no administration in time range)   ondansetron (ZOFRAN) injection 4 mg (has no administration in time range)   hydrALAZINE (APRESOLINE) injection 10 mg (has no administration in time range)   insulin lispro (HUMALOG) injection vial 0-8 Units (0 Units SubCUTAneous Not Given 3/2/23 1629)   insulin lispro (HUMALOG) injection vial 0-4 Units (0 Units SubCUTAneous Not Given 3/2/23 2004)   glucose chewable tablet 16 g (has no administration in time range)   dextrose bolus 10% 125 mL (has no administration in time range)     Or   dextrose bolus 10% 250 mL (has no administration in time range)   glucagon injection 1 mg (has no administration in time range)   dextrose 10 % infusion (has no administration in time range)   traMADol (ULTRAM) tablet 50 mg (50 mg Oral Given 3/2/23 1243)   perflutren lipid microspheres (DEFINITY) injection 1.5 mL (has no administration in time range)   hydrALAZINE (APRESOLINE) injection 10 mg (10 mg IntraVENous Given 3/1/23 1642)   labetalol (NORMODYNE;TRANDATE) injection 10 mg (10 mg IntraVENous Given 3/1/23 1851)       ED Course as of 03/02/23 2102   Wed Mar 01, 2023   1510 NIHSS score of 0 [KS]   1514 ECHO 1/18/2021: EF 63% [KS]   1641 CT head findings noted: no intracranial hemorrhage   [KS]      ED Course User Index  [KS] Yakelin Escobedo MD          I am the Primary Clinician of Record. Final impression  1. Weakness of both upper extremities    2. Lip numbness          Disposition/plan  DISPOSITION Decision To Admit 03/01/2023 06:43:31 PM    PATIENT REFERRED TO:  No follow-up provider specified.     DISCHARGE MEDICATIONS:  Current Discharge Medication List DISCONTINUED MEDICATIONS:  Current Discharge Medication List        STOP taking these medications       metoprolol succinate (TOPROL XL) 50 MG extended release tablet Comments:   Reason for Stopping:                      (Please note that portions of this note were completed with a voice recognition program.  Efforts were made to edit the dictations but occasionally words are mis-transcribed.)         Deborah Chacon MD  Resident  03/02/23 9772

## 2023-03-02 PROBLEM — I16.0 HYPERTENSIVE URGENCY: Status: ACTIVE | Noted: 2023-01-01

## 2023-03-02 LAB
EKG ATRIAL RATE: 75 BPM
EKG P AXIS: 73 DEGREES
EKG P-R INTERVAL: 180 MS
EKG Q-T INTERVAL: 444 MS
EKG QRS DURATION: 152 MS
EKG QTC CALCULATION (BAZETT): 495 MS
EKG R AXIS: -14 DEGREES
EKG T AXIS: 43 DEGREES
EKG VENTRICULAR RATE: 75 BPM
METER GLUCOSE: 102 MG/DL (ref 74–99)
METER GLUCOSE: 103 MG/DL (ref 74–99)
METER GLUCOSE: 155 MG/DL (ref 74–99)
METER GLUCOSE: 163 MG/DL (ref 74–99)
METER GLUCOSE: 191 MG/DL (ref 74–99)

## 2023-03-02 PROCEDURE — 6370000000 HC RX 637 (ALT 250 FOR IP): Performed by: INTERNAL MEDICINE

## 2023-03-02 PROCEDURE — 2060000000 HC ICU INTERMEDIATE R&B

## 2023-03-02 PROCEDURE — 93010 ELECTROCARDIOGRAM REPORT: CPT | Performed by: INTERNAL MEDICINE

## 2023-03-02 PROCEDURE — 82962 GLUCOSE BLOOD TEST: CPT

## 2023-03-02 RX ORDER — METOPROLOL TARTRATE 50 MG/1
50 TABLET, FILM COATED ORAL 2 TIMES DAILY
Status: DISCONTINUED | OUTPATIENT
Start: 2023-03-02 | End: 2023-03-06 | Stop reason: HOSPADM

## 2023-03-02 RX ORDER — PANTOPRAZOLE SODIUM 40 MG/1
40 TABLET, DELAYED RELEASE ORAL
Status: DISCONTINUED | OUTPATIENT
Start: 2023-03-02 | End: 2023-03-06 | Stop reason: HOSPADM

## 2023-03-02 RX ORDER — LANOLIN ALCOHOL/MO/W.PET/CERES
3 CREAM (GRAM) TOPICAL NIGHTLY PRN
Status: DISCONTINUED | OUTPATIENT
Start: 2023-03-02 | End: 2023-03-06 | Stop reason: HOSPADM

## 2023-03-02 RX ORDER — ACETAMINOPHEN 500 MG
500 TABLET ORAL EVERY 4 HOURS PRN
Status: DISCONTINUED | OUTPATIENT
Start: 2023-03-02 | End: 2023-03-06 | Stop reason: HOSPADM

## 2023-03-02 RX ORDER — CETIRIZINE HYDROCHLORIDE 10 MG/1
5 TABLET ORAL DAILY
Status: DISCONTINUED | OUTPATIENT
Start: 2023-03-02 | End: 2023-03-06 | Stop reason: HOSPADM

## 2023-03-02 RX ORDER — INSULIN LISPRO 100 [IU]/ML
0-4 INJECTION, SOLUTION INTRAVENOUS; SUBCUTANEOUS NIGHTLY
Status: DISCONTINUED | OUTPATIENT
Start: 2023-03-02 | End: 2023-03-06 | Stop reason: HOSPADM

## 2023-03-02 RX ORDER — ATORVASTATIN CALCIUM 10 MG/1
10 TABLET, FILM COATED ORAL DAILY
Status: DISCONTINUED | OUTPATIENT
Start: 2023-03-02 | End: 2023-03-06 | Stop reason: HOSPADM

## 2023-03-02 RX ORDER — METOPROLOL TARTRATE 50 MG/1
50 TABLET, FILM COATED ORAL 2 TIMES DAILY
COMMUNITY

## 2023-03-02 RX ORDER — INSULIN LISPRO 100 [IU]/ML
0-8 INJECTION, SOLUTION INTRAVENOUS; SUBCUTANEOUS
Status: DISCONTINUED | OUTPATIENT
Start: 2023-03-02 | End: 2023-03-06 | Stop reason: HOSPADM

## 2023-03-02 RX ORDER — SERTRALINE HYDROCHLORIDE 100 MG/1
100 TABLET, FILM COATED ORAL DAILY
Status: DISCONTINUED | OUTPATIENT
Start: 2023-03-02 | End: 2023-03-06 | Stop reason: HOSPADM

## 2023-03-02 RX ORDER — DEXTROSE MONOHYDRATE 100 MG/ML
INJECTION, SOLUTION INTRAVENOUS CONTINUOUS PRN
Status: DISCONTINUED | OUTPATIENT
Start: 2023-03-02 | End: 2023-03-06 | Stop reason: HOSPADM

## 2023-03-02 RX ORDER — ONDANSETRON 2 MG/ML
4 INJECTION INTRAMUSCULAR; INTRAVENOUS EVERY 6 HOURS PRN
Status: DISCONTINUED | OUTPATIENT
Start: 2023-03-02 | End: 2023-03-06 | Stop reason: HOSPADM

## 2023-03-02 RX ORDER — LOSARTAN POTASSIUM 50 MG/1
100 TABLET ORAL DAILY
Status: DISCONTINUED | OUTPATIENT
Start: 2023-03-02 | End: 2023-03-06 | Stop reason: HOSPADM

## 2023-03-02 RX ORDER — TRAMADOL HYDROCHLORIDE 50 MG/1
50 TABLET ORAL EVERY 6 HOURS PRN
Status: DISCONTINUED | OUTPATIENT
Start: 2023-03-02 | End: 2023-03-06 | Stop reason: HOSPADM

## 2023-03-02 RX ORDER — HYDRALAZINE HYDROCHLORIDE 20 MG/ML
10 INJECTION INTRAMUSCULAR; INTRAVENOUS EVERY 4 HOURS PRN
Status: DISCONTINUED | OUTPATIENT
Start: 2023-03-02 | End: 2023-03-06 | Stop reason: HOSPADM

## 2023-03-02 RX ORDER — EZETIMIBE 10 MG/1
10 TABLET ORAL NIGHTLY
Status: DISCONTINUED | OUTPATIENT
Start: 2023-03-02 | End: 2023-03-06 | Stop reason: HOSPADM

## 2023-03-02 RX ADMIN — METOPROLOL TARTRATE 50 MG: 50 TABLET, FILM COATED ORAL at 20:03

## 2023-03-02 RX ADMIN — LOSARTAN POTASSIUM 100 MG: 50 TABLET, FILM COATED ORAL at 08:41

## 2023-03-02 RX ADMIN — ATORVASTATIN CALCIUM 10 MG: 10 TABLET, FILM COATED ORAL at 08:41

## 2023-03-02 RX ADMIN — METFORMIN HYDROCHLORIDE 500 MG: 500 TABLET ORAL at 08:41

## 2023-03-02 RX ADMIN — EZETIMIBE 10 MG: 10 TABLET ORAL at 20:03

## 2023-03-02 RX ADMIN — METFORMIN HYDROCHLORIDE 500 MG: 500 TABLET ORAL at 16:29

## 2023-03-02 RX ADMIN — PANTOPRAZOLE SODIUM 40 MG: 40 TABLET, DELAYED RELEASE ORAL at 08:41

## 2023-03-02 RX ADMIN — METOPROLOL TARTRATE 50 MG: 50 TABLET, FILM COATED ORAL at 11:26

## 2023-03-02 RX ADMIN — TRAMADOL HYDROCHLORIDE 50 MG: 50 TABLET, COATED ORAL at 12:43

## 2023-03-02 RX ADMIN — SERTRALINE 100 MG: 100 TABLET, FILM COATED ORAL at 08:41

## 2023-03-02 RX ADMIN — CETIRIZINE HYDROCHLORIDE 5 MG: 5 TABLET ORAL at 08:46

## 2023-03-02 ASSESSMENT — PAIN - FUNCTIONAL ASSESSMENT: PAIN_FUNCTIONAL_ASSESSMENT: PREVENTS OR INTERFERES SOME ACTIVE ACTIVITIES AND ADLS

## 2023-03-02 ASSESSMENT — PAIN DESCRIPTION - LOCATION
LOCATION: HEAD;BACK
LOCATION: BACK
LOCATION: BACK;HEAD

## 2023-03-02 ASSESSMENT — PAIN SCALES - GENERAL
PAINLEVEL_OUTOF10: 0
PAINLEVEL_OUTOF10: 7
PAINLEVEL_OUTOF10: 3
PAINLEVEL_OUTOF10: 4
PAINLEVEL_OUTOF10: 7
PAINLEVEL_OUTOF10: 0
PAINLEVEL_OUTOF10: 0

## 2023-03-02 ASSESSMENT — PAIN DESCRIPTION - DESCRIPTORS
DESCRIPTORS: ACHING;DISCOMFORT
DESCRIPTORS: ACHING
DESCRIPTORS: ACHING

## 2023-03-02 ASSESSMENT — PAIN DESCRIPTION - PAIN TYPE: TYPE: CHRONIC PAIN

## 2023-03-02 ASSESSMENT — PAIN DESCRIPTION - ORIENTATION: ORIENTATION: MID;LOWER

## 2023-03-02 NOTE — H&P
Giovanni Smith MD FACP  Internal medicine   History and Physical      CHIEF COMPLAINT: Right sided weakness      HISTORY OF PRESENT ILLNESS:    Patient was seen on the floor earlier this morning at the main campus of [facility]  Her son was at bedside  Data reviewed in detail with both  72-year-old woman presented to [facility] emergency room with dysarthria and right-sided weakness transient in nature  She feels better this morning  She was found to have uncontrolled hypertension  Admitted for further management  She denies chest pain or shortness of breath    Past Medical History:    Past Medical History:   Diagnosis Date    Diabetes ([facility] [address]     Hypertension     Left renal artery stenosis ([facility] [address] 9/6/2018    Renal artery stenosis [facility])        Past Surgical History:    Past Surgical History:   Procedure Laterality Date    BLEPHAROPLASTY Bilateral     CARDIAC CATHETERIZATION  10/09/2020    Dr Erika Castro Right     cataract    HAND SURGERY Bilateral     trigger thumb    HYSTERECTOMY (CERVIX STATUS UNKNOWN)      KNEE SURGERY         Medications Prior to Admission:    Medications Prior to Admission: metoprolol tartrate (LOPRESSOR) 50 MG tablet, Take 50 mg by mouth 2 times daily  loratadine (CLARITIN) 10 MG capsule, Take 10 mg by mouth daily  omeprazole (PRILOSEC) 40 MG delayed release capsule, Take 40 mg by mouth daily  sertraline (ZOLOFT) 50 MG tablet, Take 50 mg by mouth daily  atorvastatin (LIPITOR) 10 MG tablet, Take 10 mg by mouth daily  metFORMIN (GLUCOPHAGE) 500 MG tablet, Take 1 tablet by mouth 2 times daily (with meals)  ezetimibe (ZETIA) 10 MG tablet, Take 1 tablet by mouth nightly  [DISCONTINUED] metoprolol succinate (TOPROL XL) 50 MG extended release tablet, Take 1 tablet by mouth 2 times daily  losartan (COZAAR) 100 MG tablet, Take 100 mg by mouth daily    Allergies:    Patient has no known allergies.     Social History:    reports that she quit smoking about 2 years ago. Her smoking use included cigarettes. She smoked an average of .5 packs per day. She has never used smokeless tobacco. She reports that she does not drink alcohol and does not use drugs. Family History:   family history is not on file. REVIEW OF SYSTEMS:  As above in the HPI, otherwise negative    PHYSICAL EXAM:    Vitals:  BP (!) 123/98   Pulse 71   Temp 97.3 °F (36.3 °C) (Oral)   Resp 19   Ht 5' 8\" (1.727 m)   Wt 153 lb (69.4 kg)   SpO2 94%   BMI 23.26 kg/m²     General:  Awake, alert, oriented X 3. Well developed, well nourished, well groomed. No apparent distress. HEENT:  Normocephalic, atraumatic. Pupils equal, round, reactive to light. No scleral icterus. No conjunctival injection. No nasal discharge. Neck:  Supple  Heart:  RRR, systolic murmur heard throughout the precordium  Lungs:  CTA bilaterally, bilat symmetrical expansion, no wheeze, rales, or rhonchi  Abdomen:   Bowel sounds present, soft, nontender, no masses, no organomegaly, no peritoneal signs  Extremities:  No clubbing, cyanosis,  Chronic peripheral edema noted  Skin:  Warm and dry, no open lesions or rash  Neuro:  Cranial nerves 2-12 intact, no focal deficits  Breast: deferred  Rectal: deferred  Genitalia:  deferred    LABS:  Data reviewed      ASSESSMENT:      Principal Problem:    Hypertensive urgency  Strokelike symptoms  Comorbidities present and past as listed below  Patient Active Problem List   Diagnosis    Left renal artery stenosis (HCC)    Hypoxia    Right bundle branch block    Non-STEMI (non-ST elevated myocardial infarction) ([facility] [address]    Essential hypertension, benign    Hypertensive urgency           PLAN:  Admit to telemetry  Check echocardiogram  Check MRI of the brain  BP control  Resume home medications  Questions answered to their satisfaction  Neurology consult    Jodie Calderón MD  3:21 PM  3/2/2023

## 2023-03-03 ENCOUNTER — APPOINTMENT (OUTPATIENT)
Dept: CT IMAGING | Age: 88
DRG: 065 | End: 2023-03-03
Payer: MEDICARE

## 2023-03-03 ENCOUNTER — APPOINTMENT (OUTPATIENT)
Dept: MRI IMAGING | Age: 88
DRG: 065 | End: 2023-03-03
Payer: MEDICARE

## 2023-03-03 ENCOUNTER — APPOINTMENT (OUTPATIENT)
Dept: GENERAL RADIOLOGY | Age: 88
DRG: 065 | End: 2023-03-03
Payer: MEDICARE

## 2023-03-03 LAB
LV EF: 63 %
LVEF MODALITY: NORMAL
METER GLUCOSE: 120 MG/DL (ref 74–99)
METER GLUCOSE: 147 MG/DL (ref 74–99)
METER GLUCOSE: 165 MG/DL (ref 74–99)

## 2023-03-03 PROCEDURE — 92611 MOTION FLUOROSCOPY/SWALLOW: CPT

## 2023-03-03 PROCEDURE — 70450 CT HEAD/BRAIN W/O DYE: CPT

## 2023-03-03 PROCEDURE — 6370000000 HC RX 637 (ALT 250 FOR IP): Performed by: PHYSICIAN ASSISTANT

## 2023-03-03 PROCEDURE — 70498 CT ANGIOGRAPHY NECK: CPT

## 2023-03-03 PROCEDURE — 74230 X-RAY XM SWLNG FUNCJ C+: CPT

## 2023-03-03 PROCEDURE — B24BZZZ ULTRASONOGRAPHY OF HEART WITH AORTA: ICD-10-PCS | Performed by: INTERNAL MEDICINE

## 2023-03-03 PROCEDURE — 6370000000 HC RX 637 (ALT 250 FOR IP): Performed by: INTERNAL MEDICINE

## 2023-03-03 PROCEDURE — 82962 GLUCOSE BLOOD TEST: CPT

## 2023-03-03 PROCEDURE — 93306 TTE W/DOPPLER COMPLETE: CPT

## 2023-03-03 PROCEDURE — 92526 ORAL FUNCTION THERAPY: CPT

## 2023-03-03 PROCEDURE — 70496 CT ANGIOGRAPHY HEAD: CPT

## 2023-03-03 PROCEDURE — 6360000004 HC RX CONTRAST MEDICATION: Performed by: RADIOLOGY

## 2023-03-03 PROCEDURE — 2060000000 HC ICU INTERMEDIATE R&B

## 2023-03-03 RX ORDER — ASPIRIN 81 MG/1
81 TABLET, CHEWABLE ORAL DAILY
Status: DISCONTINUED | OUTPATIENT
Start: 2023-03-03 | End: 2023-03-06 | Stop reason: HOSPADM

## 2023-03-03 RX ORDER — CLOPIDOGREL BISULFATE 75 MG/1
75 TABLET ORAL DAILY
Status: DISCONTINUED | OUTPATIENT
Start: 2023-03-03 | End: 2023-03-06 | Stop reason: HOSPADM

## 2023-03-03 RX ADMIN — ASPIRIN 81 MG 81 MG: 81 TABLET ORAL at 18:32

## 2023-03-03 RX ADMIN — METOPROLOL TARTRATE 50 MG: 50 TABLET, FILM COATED ORAL at 20:08

## 2023-03-03 RX ADMIN — CETIRIZINE HYDROCHLORIDE 5 MG: 5 TABLET ORAL at 08:22

## 2023-03-03 RX ADMIN — EZETIMIBE 10 MG: 10 TABLET ORAL at 20:08

## 2023-03-03 RX ADMIN — CLOPIDOGREL BISULFATE 75 MG: 75 TABLET ORAL at 18:32

## 2023-03-03 RX ADMIN — METFORMIN HYDROCHLORIDE 500 MG: 500 TABLET ORAL at 17:24

## 2023-03-03 RX ADMIN — TRAMADOL HYDROCHLORIDE 50 MG: 50 TABLET, COATED ORAL at 03:51

## 2023-03-03 RX ADMIN — METOPROLOL TARTRATE 50 MG: 50 TABLET, FILM COATED ORAL at 08:22

## 2023-03-03 RX ADMIN — METFORMIN HYDROCHLORIDE 500 MG: 500 TABLET ORAL at 08:23

## 2023-03-03 RX ADMIN — ATORVASTATIN CALCIUM 10 MG: 10 TABLET, FILM COATED ORAL at 08:22

## 2023-03-03 RX ADMIN — IOPAMIDOL 60 ML: 755 INJECTION, SOLUTION INTRAVENOUS at 14:12

## 2023-03-03 RX ADMIN — PANTOPRAZOLE SODIUM 40 MG: 40 TABLET, DELAYED RELEASE ORAL at 05:29

## 2023-03-03 RX ADMIN — MELATONIN 3 MG ORAL TABLET 3 MG: 3 TABLET ORAL at 20:08

## 2023-03-03 RX ADMIN — LOSARTAN POTASSIUM 100 MG: 50 TABLET, FILM COATED ORAL at 08:22

## 2023-03-03 RX ADMIN — SERTRALINE 100 MG: 100 TABLET, FILM COATED ORAL at 08:23

## 2023-03-03 ASSESSMENT — PAIN SCALES - GENERAL
PAINLEVEL_OUTOF10: 5
PAINLEVEL_OUTOF10: 3
PAINLEVEL_OUTOF10: 0
PAINLEVEL_OUTOF10: 3

## 2023-03-03 ASSESSMENT — PAIN DESCRIPTION - DESCRIPTORS: DESCRIPTORS: ACHING;DISCOMFORT

## 2023-03-03 ASSESSMENT — PAIN DESCRIPTION - PAIN TYPE: TYPE: ACUTE PAIN

## 2023-03-03 ASSESSMENT — PAIN DESCRIPTION - ORIENTATION: ORIENTATION: MID;LOWER

## 2023-03-03 ASSESSMENT — PAIN - FUNCTIONAL ASSESSMENT: PAIN_FUNCTIONAL_ASSESSMENT: ACTIVITIES ARE NOT PREVENTED

## 2023-03-03 ASSESSMENT — PAIN DESCRIPTION - LOCATION: LOCATION: BACK

## 2023-03-03 NOTE — PROGRESS NOTES
Pulse ox was 88% on room air at rest.  Ambulated patient on room air. Oxygen saturation was 84-86% on room air while ambulating. 2L Oxygen applied. Recovery pulse ox was 94% on 2 liters of oxygen while ambulating.

## 2023-03-03 NOTE — CONSULTS
Impression:   1. TIA vs CVA   2. Dysarthria especially with buccaneers speech. 3. Generalized weakness worse in proximal LEs. 4. Stroke risk factors: DM, HTN, age   11. Polyarthropathy: worse in bilateral knees     Recommendations:   1. DAPT for TIA/CVA  2. Atorvastatin 20 mg daily  3. MRI brain w/o contrast for stroke eval.   4. Echocardiogram   5. SLP eval and therapy for dysarthria vs aphasia. 6. PT/OT eval and therapy. 7. Further on follow up.

## 2023-03-03 NOTE — PROGRESS NOTES
Shahida Garcia MD FACP  Internal medicine  Progress Notes      CHIEF COMPLAINT: Right sided weakness      HISTORY OF PRESENT ILLNESS:   3/2/2023   Patient was seen on the floor earlier this morning at the main campus of [facility]  Her son was at bedside  Data reviewed in detail with both  51-year-old woman presented to [facility] emergency room with dysarthria and right-sided weakness transient in nature  She feels better this morning  She was found to have uncontrolled hypertension  Admitted for further management  She denies chest pain or shortness of breath  3/3/2023  Patient was seen on the floor earlier this morning  Son and his wife are at bedside  She was trying to feed herself this morning  Sitting up in chair  Dysarthria is somewhat worse  Reporting mild dysphagia as well  Blood pressure somewhat better  Past Medical History:    Past Medical History:   Diagnosis Date    Diabetes ([facility] [address]     Hypertension     Left renal artery stenosis ([facility] [address] 2018    Renal artery stenosis [facility])        Past Surgical History:    Past Surgical History:   Procedure Laterality Date    BLEPHAROPLASTY Bilateral     CARDIAC CATHETERIZATION  10/09/2020    Dr Claude Arnold Right     cataract    HAND SURGERY Bilateral     trigger thumb    HYSTERECTOMY (CERVIX STATUS UNKNOWN)      KNEE SURGERY         Medications Prior to Admission:    Medications Prior to Admission: metoprolol tartrate (LOPRESSOR) 50 MG tablet, Take 50 mg by mouth 2 times daily  loratadine (CLARITIN) 10 MG capsule, Take 10 mg by mouth daily  omeprazole (PRILOSEC) 40 MG delayed release capsule, Take 40 mg by mouth daily  sertraline (ZOLOFT) 50 MG tablet, Take 50 mg by mouth daily  atorvastatin (LIPITOR) 10 MG tablet, Take 10 mg by mouth daily  metFORMIN (GLUCOPHAGE) 500 MG tablet, Take 1 tablet by mouth 2 times daily (with meals)  ezetimibe (ZETIA) 10 MG tablet, Take 1 tablet by mouth nightly  [DISCONTINUED] metoprolol succinate (TOPROL XL) 50 MG extended release tablet, Take 1 tablet by mouth 2 times daily  losartan (COZAAR) 100 MG tablet, Take 100 mg by mouth daily    Allergies:    Patient has no known allergies. Social History:    reports that she quit smoking about 2 years ago. Her smoking use included cigarettes. She smoked an average of .5 packs per day. She has never used smokeless tobacco. She reports that she does not drink alcohol and does not use drugs. Family History:   family history is not on file. REVIEW OF SYSTEMS:  As above in the HPI, otherwise negative    PHYSICAL EXAM:    Vitals:  BP (!) 148/96   Pulse 66   Temp 98.4 °F (36.9 °C) (Oral)   Resp 17   Ht 5' 8\" (1.727 m)   Wt 153 lb (69.4 kg)   SpO2 94%   BMI 23.26 kg/m²     General:  Awake, alert, oriented X 3. Well developed, well nourished, well groomed. No apparent distress. HEENT:  Normocephalic, atraumatic. Pupils equal, round, reactive to light. No scleral icterus. No conjunctival injection. No nasal discharge. Neck:  Supple  Heart:  RRR, systolic murmur heard throughout the precordium  Lungs:  CTA bilaterally, bilat symmetrical expansion, no wheeze, rales, or rhonchi  Abdomen:   Bowel sounds present, soft, nontender, no masses, no organomegaly, no peritoneal signs  Extremities:  No clubbing, cyanosis,  Chronic peripheral edema noted  Skin:  Warm and dry, no open lesions or rash  Neuro:  Cranial nerves 2-12 intact, no focal deficits  Breast: deferred  Rectal: deferred  Genitalia:  deferred    LABS:  Data reviewed      ASSESSMENT:      Principal Problem:    Hypertensive urgency  Strokelike symptoms  Suspected left MCA stroke  Comorbidities present and past as listed below  Patient Active Problem List   Diagnosis    Left renal artery stenosis (HCC)    Hypoxia    Right bundle branch block    Non-STEMI (non-ST elevated myocardial infarction) ([facility] [address]    Essential hypertension, benign    Hypertensive urgency           PLAN:  Admit to telemetry  Check echocardiogram  Check MRI of the brain  Check swallow study  BP control  Resume home medications  Questions answered to their satisfaction  Neurology consult    Boo Ruiz MD  10:41 AM  3/3/2023

## 2023-03-03 NOTE — CARE COORDINATION
WORK/CASEMANAGEMENT TRANSITION OF CARE PLANNINGJailene Morfin, [address]):  pt for echo done and needs read, and mri of braind and MBS is pending. Plan is home with  family to assist. I met with son, daughter in law and pt this a.m. they have no preference for home o2 if needed when choices were offered. [facility]. Pt per rn's pulse ox is at 84% on room air and needs 2l for recovery. Will need a new pulse ox if older than 48  hours or liter changes for home usage. We will also need a qualifying dx since hypoxia alone will not cover home o2. DIANA Briseno  3/3/2023    The Plan for Transition of Care is related to the following treatment goals: dme   The Patient and/or patient representative  was provided with a choice of provider and agrees   with the discharge plan. [x] Yes [] No  Freedom of choice list was provided with basic dialogue that supports the patient's individualized plan of care/goals, treatment preferences and shares the quality data associated with the providers.  [x] Yes [] No

## 2023-03-03 NOTE — PROCEDURES
3/3 1030am: Patient c/o extreme SOB immediately when laying on MRI table, even with head lifted, refused 2 minute diffusion scan when offered a limited study.

## 2023-03-03 NOTE — PLAN OF CARE
Went to see patient. She was getting ready to be transported down for swallow eval and possibly CT/CTAs. Spoke briefly with patient and her granddaughter. Patient still with dysarthria. Unable to lay flat for MRI brain- this was d/c. Will obtain repeat CT head, CTA head/neck, echo. Check LDL, HA1C. DAPT 21 days, then monotherpay   Statin therapy with goal LDL < 70     Will follow up with patient tomorrow.      Ottoniel Meehan PA-C

## 2023-03-03 NOTE — CONSULTS
NEUROLOGY CONSULT NOTE      Requesting Physician: Rocio Pagan MD    Reason for Consult:  Evaluate for TIA    History of Present Illness: Mary Anne Cantrell is a 80 y.o. female  with h/o HTN, DM, and renal artery stenosis, who was admitted to [facility] on 3/1/2023 with presentation of slurred speech and weakness. Patient reporting onset of symptoms day before presentation with initial onset of numbness in her lips night before presentation while eating dinner. Symptoms persisted to the following day with onset of weakness in  strength and slightly slurred speech. He recently presented to the [facility] ED with dysarthria and transient right-sided weakness. Patient subsequently transferred to [facility] due to concerns for TIA/stroke. Blood pressure was significantly elevated at time of presentation at 188/90 and remained significantly elevated for several hours. She was afebrile. CT scan of the head did not reveal any acute intracranial abnormalities. Past Medical History:        Diagnosis Date    Diabetes ([facility] [address]     Hypertension     Left renal artery stenosis ([facility] [address] 2018    Renal artery stenosis [facility])            Procedure Laterality Date    BLEPHAROPLASTY Bilateral     CARDIAC CATHETERIZATION  10/09/2020    Dr Neda Kahn Right     cataract    HAND SURGERY Bilateral     trigger thumb    HYSTERECTOMY (CERVIX STATUS UNKNOWN)      KNEE SURGERY         Social History:  Social History     Tobacco Use   Smoking Status Former    Packs/day: 0.50    Types: Cigarettes    Quit date: 3/31/2020    Years since quittin.9   Smokeless Tobacco Never     Social History     Substance and Sexual Activity   Alcohol Use No    Comment: rare     Social History     Substance and Sexual Activity   Drug Use No         Family History:   History reviewed. No pertinent family history.     Review of Systems:  All systems reviewed are negative except what is mentioned in history of present illness. Allergies:    No Known Allergies     Current Medications:   atorvastatin (LIPITOR) tablet 10 mg, Daily  ezetimibe (ZETIA) tablet 10 mg, Nightly  cetirizine (ZYRTEC) tablet 5 mg, Daily  losartan (COZAAR) tablet 100 mg, Daily  metFORMIN (GLUCOPHAGE) tablet 500 mg, BID WC  metoprolol tartrate (LOPRESSOR) tablet 50 mg, BID  pantoprazole (PROTONIX) tablet 40 mg, QAM AC  sertraline (ZOLOFT) tablet 100 mg, Daily  acetaminophen (TYLENOL) tablet 500 mg, Q4H PRN  melatonin tablet 3 mg, Nightly PRN  ondansetron (ZOFRAN) injection 4 mg, Q6H PRN  hydrALAZINE (APRESOLINE) injection 10 mg, Q4H PRN  insulin lispro (HUMALOG) injection vial 0-8 Units, TID WC  insulin lispro (HUMALOG) injection vial 0-4 Units, Nightly  glucose chewable tablet 16 g, PRN  dextrose bolus 10% 125 mL, PRN   Or  dextrose bolus 10% 250 mL, PRN  glucagon injection 1 mg, PRN  dextrose 10 % infusion, Continuous PRN  traMADol (ULTRAM) tablet 50 mg, Q6H PRN  perflutren lipid microspheres (DEFINITY) injection 1.5 mL, ONCE PRN         Physical Exam:  BP (!) 147/86   Pulse 68   Temp 97.6 °F (36.4 °C) (Temporal)   Resp 16   Ht 5' 8\" (1.727 m)   Wt 153 lb (69.4 kg)   SpO2 97%   BMI 23.26 kg/m²  I Body mass index is 23.26 kg/m². I   Wt Readings from Last 1 Encounters:   03/02/23 153 lb (69.4 kg)          HEENT: Normocephalic, atraumatic, no lesions or abnormalities noted. Neck:  supple with full ROM; no masses, nodes or bruits; no cervical tenderness on palpation. Lungs:  clear to auscultation  bilaterally     CV: RRR without gallops or murmurs     Extremities: no c/c/e            Neurologic Exam     Mental Status:  Patient was alert, responsive, oriented, appropriate, answering questions, and following commands. Speech was fluent with normal sensorium and cognition.       Cranial Nerves: Pupils were equal round and reactive to light and accommodation;    Visual fields were full on confrontation;  Funduscopic exam was normal; no pappilledema   Extraocular movements were intact; no nystagmus; Intact facial sensation to temp, pinprick, and light touch; Symmetric facial movements with good lip and eye closure bilaterally; Hearing was intact; [place] was midline;    Normal palatal elevation with midline uvula  Sternocleidomastoid  / Trapezius strength of 5/5. Tongue was midline with no atrophy or fasciculations    Motor Exam:  Strength was 5/5 throughout; normal tone and bulk; no atrophy or fasiculations noted. Sensory Exam:  Normal sensation to light touch, pin-prick, and temperature; No sensory extinction. Cerebella Exam:  Intact finger-nose-finger, rapidly alternating movements, fine motor movements, and heel-down-shin maneuvers; No cerebellar rebound or drift; No tremors   Normal tandem gait. Negative Romberg      Gait:  Normal gait pattern with intact heel/toe walking. (Gait was not tested. Reflexes: normal and symmetric bilaterally; Babinski is negative     Labs:    CBC:   Recent Labs     03/01/23  1530   WBC 6.2   HGB 14.0      MCV 90.4   MCH 28.5   MCHC 31.5*   RDW 13.1     CMP:  Recent Labs     03/01/23  1530 03/01/23  1543     --    K 4.4  --      --    CO2 28  --    BUN 26*  --    CREATININE 1.0  --    LABGLOM 54  --    GLUCOSE 106* 93   CALCIUM 9.8  --      Liver:   Recent Labs     03/01/23  1530   AST 17   ALT 14   ALKPHOS 176*   PROT 7.0   LABALBU 4.2   BILITOT 0.3     INR:   Recent Labs     03/01/23  1652   PROTIME 12.5*   INR 1.1       ToxicologyNo results for input(s): PHENYTOIN, CARBTOT, PHENOBARB, VALPROATE, LAMOTRIG in the last 72 hours. Invalid input(s):  KEPPRA  No results for input(s): AMPMETHURSCR, BARBTQTU, BDZQTU, CANNABQUANT, COCMETQTU, OPIAU, PCPQUANT in the last 72 hours.     Radiology:  CT HEAD WO CONTRAST    Result Date: 3/1/2023  EXAMINATION: CT OF THE HEAD WITHOUT CONTRAST  3/1/2023 1:10 pm TECHNIQUE: CT of the head was performed without the administration of intravenous contrast. Automated exposure control, iterative reconstruction, and/or weight based adjustment of the mA/kV was utilized to reduce the radiation dose to as low as reasonably achievable. COMPARISON: 05/30/2022 HISTORY: ORDERING SYSTEM PROVIDED HISTORY: stroke like symptoms TECHNOLOGIST PROVIDED HISTORY: Reason for exam:->stroke like symptoms Has a \"code stroke\" or \"stroke alert\" been called? ->No Decision Support Exception - unselect if not a suspected or confirmed emergency medical condition->Emergency Medical Condition (MA) FINDINGS: BRAIN/VENTRICLES: There is no acute intracranial hemorrhage, mass effect or midline shift. No abnormal extra-axial fluid collection. The gray-white differentiation is maintained without evidence of an acute infarct. There is prominence of the ventricles and sulci due to global parenchymal volume loss. There are nonspecific areas of hypoattenuation within the periventricular and subcortical white matter, which likely represent chronic microvascular ischemic change. ORBITS: The visualized portion of the orbits demonstrate no acute abnormality. SINUSES: The visualized paranasal sinuses and mastoid air cells demonstrate no acute abnormality. SOFT TISSUES/SKULL: No acute abnormality of the visualized skull or soft tissues. No acute intracranial abnormality. Stable diffuse volume loss and chronic small vessel ischemic white matter change. XR CHEST PORTABLE    Result Date: 3/1/2023  EXAMINATION: ONE XRAY VIEW OF THE CHEST 3/1/2023 2:29 pm COMPARISON: 4 January 2021 HISTORY: ORDERING SYSTEM PROVIDED HISTORY: shortness of breath TECHNOLOGIST PROVIDED HISTORY: Reason for exam:->shortness of breath FINDINGS: Intact sternal wires. The lungs are without acute focal process. There is no effusion or pneumothorax. The cardiomediastinal silhouette is without acute process. The osseous structures are without acute process. No acute process.          The patient's records from referring provider and available information in the EHR was reviewed. Impression:  ***  ***    Principal Problem:    Hypertensive urgency  Resolved Problems:    * No resolved hospital problems. *      Recommendations:                                            ***  ***  Case was discussed with primary service. All questions were answered. It was my pleasure to evaluate Laurence Lorenzo today. Please call with questions.       Electronically signed by Chan Benítez MD on 3/2/2023 at 8:26 PM

## 2023-03-03 NOTE — PROGRESS NOTES
0800- Pt up in chair with family present, no chair alarm, pt and family states they are able to assist her and will call for help if needed. 0900-Pt got up to bathroom and then to lay down, SOB. Pulse ox 84%. Placed 2L nc and deep breathing, able to sat 94%.  Pt to MRI on cart with O2

## 2023-03-04 LAB
CHOLESTEROL, TOTAL: 133 MG/DL (ref 0–199)
HBA1C MFR BLD: 6.4 % (ref 4–5.6)
HDLC SERPL-MCNC: 68 MG/DL
LDL CHOLESTEROL CALCULATED: 45 MG/DL (ref 0–99)
METER GLUCOSE: 102 MG/DL (ref 74–99)
METER GLUCOSE: 150 MG/DL (ref 74–99)
METER GLUCOSE: 196 MG/DL (ref 74–99)
METER GLUCOSE: 82 MG/DL (ref 74–99)
TRIGL SERPL-MCNC: 102 MG/DL (ref 0–149)
VLDLC SERPL CALC-MCNC: 20 MG/DL

## 2023-03-04 PROCEDURE — 80061 LIPID PANEL: CPT

## 2023-03-04 PROCEDURE — 6370000000 HC RX 637 (ALT 250 FOR IP): Performed by: PHYSICIAN ASSISTANT

## 2023-03-04 PROCEDURE — 2060000000 HC ICU INTERMEDIATE R&B

## 2023-03-04 PROCEDURE — 82962 GLUCOSE BLOOD TEST: CPT

## 2023-03-04 PROCEDURE — 36415 COLL VENOUS BLD VENIPUNCTURE: CPT

## 2023-03-04 PROCEDURE — 99232 SBSQ HOSP IP/OBS MODERATE 35: CPT | Performed by: PHYSICIAN ASSISTANT

## 2023-03-04 PROCEDURE — 6370000000 HC RX 637 (ALT 250 FOR IP): Performed by: INTERNAL MEDICINE

## 2023-03-04 PROCEDURE — 83036 HEMOGLOBIN GLYCOSYLATED A1C: CPT

## 2023-03-04 RX ADMIN — MELATONIN 3 MG ORAL TABLET 3 MG: 3 TABLET ORAL at 21:46

## 2023-03-04 RX ADMIN — ACETAMINOPHEN 500 MG: 500 TABLET, FILM COATED ORAL at 21:46

## 2023-03-04 RX ADMIN — ASPIRIN 81 MG 81 MG: 81 TABLET ORAL at 09:04

## 2023-03-04 RX ADMIN — METFORMIN HYDROCHLORIDE 500 MG: 500 TABLET ORAL at 09:01

## 2023-03-04 RX ADMIN — EZETIMIBE 10 MG: 10 TABLET ORAL at 20:17

## 2023-03-04 RX ADMIN — SERTRALINE 100 MG: 100 TABLET, FILM COATED ORAL at 09:04

## 2023-03-04 RX ADMIN — METFORMIN HYDROCHLORIDE 500 MG: 500 TABLET ORAL at 18:31

## 2023-03-04 RX ADMIN — PANTOPRAZOLE SODIUM 40 MG: 40 TABLET, DELAYED RELEASE ORAL at 05:08

## 2023-03-04 RX ADMIN — METOPROLOL TARTRATE 50 MG: 50 TABLET, FILM COATED ORAL at 20:17

## 2023-03-04 RX ADMIN — LOSARTAN POTASSIUM 100 MG: 50 TABLET, FILM COATED ORAL at 09:04

## 2023-03-04 RX ADMIN — METOPROLOL TARTRATE 50 MG: 50 TABLET, FILM COATED ORAL at 09:03

## 2023-03-04 RX ADMIN — CETIRIZINE HYDROCHLORIDE 5 MG: 5 TABLET ORAL at 09:03

## 2023-03-04 RX ADMIN — CLOPIDOGREL BISULFATE 75 MG: 75 TABLET ORAL at 09:04

## 2023-03-04 RX ADMIN — TRAMADOL HYDROCHLORIDE 50 MG: 50 TABLET, COATED ORAL at 09:16

## 2023-03-04 RX ADMIN — ATORVASTATIN CALCIUM 10 MG: 10 TABLET, FILM COATED ORAL at 09:04

## 2023-03-04 ASSESSMENT — PAIN - FUNCTIONAL ASSESSMENT: PAIN_FUNCTIONAL_ASSESSMENT: ACTIVITIES ARE NOT PREVENTED

## 2023-03-04 ASSESSMENT — PAIN SCALES - GENERAL
PAINLEVEL_OUTOF10: 4
PAINLEVEL_OUTOF10: 8

## 2023-03-04 ASSESSMENT — PAIN DESCRIPTION - ORIENTATION: ORIENTATION: MID

## 2023-03-04 ASSESSMENT — PAIN DESCRIPTION - FREQUENCY: FREQUENCY: CONTINUOUS

## 2023-03-04 ASSESSMENT — PAIN DESCRIPTION - ONSET: ONSET: ON-GOING

## 2023-03-04 ASSESSMENT — PAIN DESCRIPTION - DESCRIPTORS: DESCRIPTORS: ACHING;DISCOMFORT

## 2023-03-04 ASSESSMENT — PAIN DESCRIPTION - LOCATION: LOCATION: BACK

## 2023-03-04 ASSESSMENT — PAIN DESCRIPTION - PAIN TYPE: TYPE: CHRONIC PAIN

## 2023-03-04 NOTE — PROGRESS NOTES
Blaine Malagon MD FACP  Internal medicine  Progress Notes      CHIEF COMPLAINT: Right sided weakness      HISTORY OF PRESENT ILLNESS:   3/2/2023   Patient was seen on the floor earlier this morning at the main campus of [facility]  Her son was at bedside  Data reviewed in detail with both  29-year-old woman presented to [facility] emergency room with dysarthria and right-sided weakness transient in nature  She feels better this morning  She was found to have uncontrolled hypertension  Admitted for further management  She denies chest pain or shortness of breath  3/3/2023  Patient was seen on the floor earlier this morning  Son and his wife are at bedside  She was trying to feed herself this morning  Sitting up in chair  Dysarthria is somewhat worse  Reporting mild dysphagia as well  Blood pressure somewhat better  3/4/2023  Patient was seen on the floor earlier this morning  Daughter and son-in-law at bedside  Registered nurse at bedside  Dysarthria is better  No difficulty swallowing  Blood pressure is better  Past Medical History:    Past Medical History:   Diagnosis Date    Diabetes ([facility] [address]     Hypertension     Left renal artery stenosis ([facility] [address] 9/6/2018    Renal artery stenosis [facility])        Past Surgical History:    Past Surgical History:   Procedure Laterality Date    BLEPHAROPLASTY Bilateral     CARDIAC CATHETERIZATION  10/09/2020    Dr Villa Sees Right     cataract    HAND SURGERY Bilateral     trigger thumb    HYSTERECTOMY (CERVIX STATUS UNKNOWN)      KNEE SURGERY         Medications Prior to Admission:    Medications Prior to Admission: metoprolol tartrate (LOPRESSOR) 50 MG tablet, Take 50 mg by mouth 2 times daily  loratadine (CLARITIN) 10 MG capsule, Take 10 mg by mouth daily  omeprazole (PRILOSEC) 40 MG delayed release capsule, Take 40 mg by mouth daily  sertraline (ZOLOFT) 50 MG tablet, Take 50 mg by mouth daily  atorvastatin (LIPITOR) 10 MG tablet, Take 10 mg by mouth daily  metFORMIN (GLUCOPHAGE) 500 MG tablet, Take 1 tablet by mouth 2 times daily (with meals)  ezetimibe (ZETIA) 10 MG tablet, Take 1 tablet by mouth nightly  [DISCONTINUED] metoprolol succinate (TOPROL XL) 50 MG extended release tablet, Take 1 tablet by mouth 2 times daily  losartan (COZAAR) 100 MG tablet, Take 100 mg by mouth daily    Allergies:    Patient has no known allergies. Social History:    reports that she quit smoking about 2 years ago. Her smoking use included cigarettes. She smoked an average of .5 packs per day. She has never used smokeless tobacco. She reports that she does not drink alcohol and does not use drugs. Family History:   family history is not on file. REVIEW OF SYSTEMS:  As above in the HPI, otherwise negative    PHYSICAL EXAM:    Vitals:  BP (!) 140/95   Pulse 64   Temp 97.7 °F (36.5 °C) (Temporal)   Resp 18   Ht 5' 8\" (1.727 m)   Wt 153 lb (69.4 kg)   SpO2 98%   BMI 23.26 kg/m²     General:  Awake, alert, oriented X 3. Well developed, well nourished, well groomed. No apparent distress. HEENT:  Normocephalic, atraumatic. Pupils equal, round, reactive to light. No scleral icterus. No conjunctival injection. No nasal discharge. Neck:  Supple  Heart:  RRR, systolic murmur heard throughout the precordium  Lungs:  CTA bilaterally, bilat symmetrical expansion, no wheeze, rales, or rhonchi  Abdomen:   Bowel sounds present, soft, nontender, no masses, no organomegaly, no peritoneal signs  Extremities:  No clubbing, cyanosis,  Chronic peripheral edema noted  Skin:  Warm and dry, no open lesions or rash  Neuro:  Cranial nerves 2-12 intact, no focal deficits  Breast: deferred  Rectal: deferred  Genitalia:  deferred    LABS:  Data reviewed      ASSESSMENT:      Principal Problem:    Hypertensive urgency  Strokelike symptoms  Suspected left MCA stroke  Comorbidities present and past as listed below  Patient Active Problem List   Diagnosis    Left renal artery stenosis (HCC)    Hypoxia    Right bundle branch block    Non-STEMI (non-ST elevated myocardial infarction) ([facility] [address]    Essential hypertension, benign    Hypertensive urgency           PLAN:  Admit to telemetry  Check echocardiogram/results reviewed  Check MRI of the brain/unable to perform due to patient's inability to lie flat  Check swallow study/fairly unremarkable  BP control  Resume home medications  Questions answered to their satisfaction  Neurology consult/input appreciated  Dual antiplatelet therapy  Risk modification strategies discussed  Alejandra Sandhu MD  3:11 PM  3/4/2023

## 2023-03-04 NOTE — PROGRESS NOTES
[address] [facility]  Neurology follow up     Date:  3/4/2023  Patient Name:  Danette Tony  YOB: 1935  MRN: [id number]     PCP:  Rodolfo Darby MD   Referring:  No ref. provider found      Chief Complaint: stroke     History obtained from: patient and chart     Assessment    Suspected small ischemic stroke causing dysarthria and transient R side weakness      Plan  DAPT 21 days, then ASA monotherapy   Statin therapy with goal LDL < 70 (at goal)   Risk factor modification  Speech therapy   Stroke clinic f/u   Okay to d/c from neuro- will sign off, please call with questions or new issues. History of Present Illness: Danette Tony is a 80 y.o. female presenting for evaluation of stroke. She presented to [facility] ED with dysarthria and transient R side weakness. CT head was negative for anything acute. She was noted to have uncontrolled HTN. Was unable to lay flat for MRI, so this was d/c. Swallow eval showed minimal-mild dysphagia. Repeat CT head did not show any acute process. CTAs showed b/l severe vertebral artery stenosis. No significant intracranial or carotid artery stenosis. Echo was unrevealing. LDL 45. A1C 6.4. She previously took a daily baby ASA but she discontinued this a couple months ago on her own. Daughter at bedside. Review of Systems:  + dysarthria   + dysphagia     Allergies:      No Known Allergies     Physical Examination  Vitals   Vitals:    03/03/23 1830 03/1935 03/04/23 0824 03/04/23 0916   BP: 130/67 124/83 (!) 140/95    Pulse: 63 66 64    Resp: 18 16 16 18   Temp: 97.3 °F (36.3 °C) 97.6 °F (36.4 °C) 97.7 °F (36.5 °C)    TempSrc: Oral Temporal Temporal    SpO2: 93% 94% 98%    Weight:       Height:            General: Patient appears in no acute distress. HEENT: Normocephalic, atraumatic  Chest: effort normal   Extremities/Peripheral vascular: No edema/swelling noted. No cold limbs noted.     Neurologic Examination    Mental Status  Alert, and oriented to person, place and time. Speech is fluent with intact comprehension. No evidence of memory impairment. Attention and concentration appeared normal.     Cranial Nerves  II. Visual fields full to confrontation bilaterally.   III, IV, VI: Pupils equally round and reactive to light, 3 to 2 mm bilaterally.   EOMs: full, no nystagmus.   V. Facial sensation intact to light touch bilaterally  VII: Facial movements symmetric and strong  VIII: Hearing intact to voice  IX,X: Palate elevates symmetrically. Mild to moderate dysarthria  XI: Sternocleidomastoid and trapezius 5/5 bilaterally   XII: Tongue is midline    Motor     Right Left   Right Left   Deltoid 5 5  Hip Flexion 5 5   Biceps 5 5  Knee Extension 5 5   Triceps 5 5  Knee Flexion 5 5   Handgrip 5 5  Ankle Dorsiflexion 5 5       Ankle Plantarflexion 5 5     Tone: Normal in all four limbs    Bulk: Normal in all four limbs with no evidence of atrophy    Pronator drift: absent bilaterally    Sensation  Light Touch: Intact distally in all four limbs    Reflexes    No babinski     Coordination  Rapid alternating movements normal in bilateral upper extremities  Finger to nose testing normal bilaterally    Gait    Deferred for safety/fall consideration      Labs  Recent Labs     03/04/23  0514   HDL 68   LDLCALC 45   LABA1C 6.4*       Imaging  CT HEAD WO CONTRAST   Final Result   1. No acute intracranial abnormality on noncontrast head CT.   2. Severe stenosis involving origin of both vertebral arteries.   3. Unremarkable CT angiogram head.         CTA HEAD W CONTRAST   Final Result   1. No acute intracranial abnormality on noncontrast head CT.   2. Severe stenosis involving origin of both vertebral arteries.   3. Unremarkable CT angiogram head.         CTA NECK W CONTRAST   Final Result   1. No acute intracranial abnormality on noncontrast head CT.   2. Severe stenosis involving origin of both vertebral arteries.   3.  Unremarkable CT angiogram head. FL MODIFIED BARIUM SWALLOW W VIDEO   Final Result   Mild swallowing dysfunction evident by hypopharyngeal barium retention   together with mild laryngeal penetration with thin liquid barium. No barium   aspiration. Please see separate speech pathology report for full discussion of findings   and recommendations. XR CHEST PORTABLE   Final Result   No acute process. CT HEAD WO CONTRAST   Final Result   No acute intracranial abnormality. Stable diffuse volume loss and chronic small vessel ischemic white matter   change. I have personally reviewed the following images: CT head     Other Testing Results    Echo    Normal left ventricle size and systolic function. Ejection fraction is visually estimated at 60-65%. No regional wall motion abnormalities seen. Mild concentric left ventricular hypertrophy. Stage II diastolic dysfunction. No evidence of patent foramen ovale on bubble study. Normal right ventricular size. Right ventricle global systolic function is mildly reduced. TAPSE 14 mm. There is mild aortic stenosis with valve area of 1.8 sq cm. Mild tricuspid regurgitation. RVSP is 75 mmHg. Pulmonary hypertension is severe . No evidence for hemodynamically significant pericardial effusion.        Electronically signed by GWYN Warren on 3/4/2023 at 4:07 PM

## 2023-03-05 LAB
METER GLUCOSE: 112 MG/DL (ref 74–99)
METER GLUCOSE: 149 MG/DL (ref 74–99)
METER GLUCOSE: 160 MG/DL (ref 74–99)
METER GLUCOSE: 194 MG/DL (ref 74–99)

## 2023-03-05 PROCEDURE — 6370000000 HC RX 637 (ALT 250 FOR IP): Performed by: INTERNAL MEDICINE

## 2023-03-05 PROCEDURE — 1200000000 HC SEMI PRIVATE

## 2023-03-05 PROCEDURE — 6370000000 HC RX 637 (ALT 250 FOR IP): Performed by: PHYSICIAN ASSISTANT

## 2023-03-05 PROCEDURE — 82962 GLUCOSE BLOOD TEST: CPT

## 2023-03-05 RX ADMIN — METFORMIN HYDROCHLORIDE 500 MG: 500 TABLET ORAL at 18:53

## 2023-03-05 RX ADMIN — METOPROLOL TARTRATE 50 MG: 50 TABLET, FILM COATED ORAL at 20:46

## 2023-03-05 RX ADMIN — EZETIMIBE 10 MG: 10 TABLET ORAL at 20:46

## 2023-03-05 RX ADMIN — CLOPIDOGREL BISULFATE 75 MG: 75 TABLET ORAL at 10:51

## 2023-03-05 RX ADMIN — LOSARTAN POTASSIUM 100 MG: 50 TABLET, FILM COATED ORAL at 10:51

## 2023-03-05 RX ADMIN — ASPIRIN 81 MG 81 MG: 81 TABLET ORAL at 10:51

## 2023-03-05 RX ADMIN — SERTRALINE 100 MG: 100 TABLET, FILM COATED ORAL at 10:51

## 2023-03-05 RX ADMIN — MELATONIN 3 MG ORAL TABLET 3 MG: 3 TABLET ORAL at 20:46

## 2023-03-05 RX ADMIN — CETIRIZINE HYDROCHLORIDE 5 MG: 5 TABLET ORAL at 10:54

## 2023-03-05 RX ADMIN — METFORMIN HYDROCHLORIDE 500 MG: 500 TABLET ORAL at 10:51

## 2023-03-05 RX ADMIN — PANTOPRAZOLE SODIUM 40 MG: 40 TABLET, DELAYED RELEASE ORAL at 06:12

## 2023-03-05 RX ADMIN — ATORVASTATIN CALCIUM 10 MG: 10 TABLET, FILM COATED ORAL at 10:51

## 2023-03-05 RX ADMIN — METOPROLOL TARTRATE 50 MG: 50 TABLET, FILM COATED ORAL at 10:51

## 2023-03-05 ASSESSMENT — PAIN SCALES - GENERAL: PAINLEVEL_OUTOF10: 0

## 2023-03-05 NOTE — DISCHARGE INSTRUCTIONS
Stroke clinic follow-up appointment        [facility] WILL CONTACT PATIENT IN REGARDS TO SETTING UP HOME VISITS.    THEY WILL START TENTATIVELY [facility] 2023. [phone]

## 2023-03-05 NOTE — PROGRESS NOTES
Pascual Gannon MD FACP  Internal medicine  Progress Notes      CHIEF COMPLAINT: Right sided weakness      HISTORY OF PRESENT ILLNESS:   3/2/2023   Patient was seen on the floor earlier this morning at the main campus [facility]  Her son was at bedside  Data reviewed in detail with both  63-year-old woman presented to [facility] emergency room with dysarthria and right-sided weakness transient in nature  She feels better this morning  She was found to have uncontrolled hypertension  Admitted for further management  She denies chest pain or shortness of breath  3/3/2023  Patient was seen on the floor earlier this morning  Son and his wife are at bedside  She was trying to feed herself this morning  Sitting up in chair  Dysarthria is somewhat worse  Reporting mild dysphagia as well  Blood pressure somewhat better  3/4/2023  Patient was seen on the floor earlier this morning  Daughter and son-in-law at bedside  Registered nurse at bedside  Dysarthria is better  No difficulty swallowing  Blood pressure is better  3/5/2023  Patient was seen on the floor earlier this morning  Speech is much better  Son at bedside  Past Medical History:    Past Medical History:   Diagnosis Date    Diabetes ([facility] [address]     Hypertension     Left renal artery stenosis ([facility] [address] 9/6/2018    Renal artery stenosis [facility])        Past Surgical History:    Past Surgical History:   Procedure Laterality Date    BLEPHAROPLASTY Bilateral     CARDIAC CATHETERIZATION  10/09/2020    Dr Remberto Abebe Right     cataract    HAND SURGERY Bilateral     trigger thumb    HYSTERECTOMY (CERVIX STATUS UNKNOWN)      KNEE SURGERY         Medications Prior to Admission:    Medications Prior to Admission: metoprolol tartrate (LOPRESSOR) 50 MG tablet, Take 50 mg by mouth 2 times daily  loratadine (CLARITIN) 10 MG capsule, Take 10 mg by mouth daily  omeprazole (PRILOSEC) 40 MG delayed release capsule, Take 40 mg by mouth daily  sertraline (ZOLOFT) 50 MG tablet, Take 50 mg by mouth daily  atorvastatin (LIPITOR) 10 MG tablet, Take 10 mg by mouth daily  metFORMIN (GLUCOPHAGE) 500 MG tablet, Take 1 tablet by mouth 2 times daily (with meals)  ezetimibe (ZETIA) 10 MG tablet, Take 1 tablet by mouth nightly  [DISCONTINUED] metoprolol succinate (TOPROL XL) 50 MG extended release tablet, Take 1 tablet by mouth 2 times daily  losartan (COZAAR) 100 MG tablet, Take 100 mg by mouth daily    Allergies:    Patient has no known allergies.    Social History:    reports that she quit smoking about 2 years ago. Her smoking use included cigarettes. She smoked an average of .5 packs per day. She has never used smokeless tobacco. She reports that she does not drink alcohol and does not use drugs.    Family History:   family history is not on file.    REVIEW OF SYSTEMS:  As above in the HPI, otherwise negative    PHYSICAL EXAM:    Vitals:  BP (!) 183/64   Pulse 61   Temp 97.6 °F (36.4 °C) (Temporal)   Resp 16   Ht 5' 8\" (1.727 m)   Wt 153 lb (69.4 kg)   SpO2 97%   BMI 23.26 kg/m²     General:  Awake, alert, oriented X 3.  Well developed, well nourished, well groomed.  No apparent distress.  HEENT:  Normocephalic, atraumatic.  Pupils equal, round, reactive to light.  No scleral icterus.  No conjunctival injection.  No nasal discharge.  Neck:  Supple  Heart:  RRR, systolic murmur heard throughout the precordium  Lungs:  CTA bilaterally, bilat symmetrical expansion, no wheeze, rales, or rhonchi  Abdomen:  Bowel sounds present, soft, nontender, no masses, no organomegaly, no peritoneal signs  Extremities:  No clubbing, cyanosis,  Chronic peripheral edema noted  Skin:  Warm and dry, no open lesions or rash  Neuro:  Cranial nerves 2-12 intact, no focal deficits  Breast: deferred  Rectal: deferred  Genitalia:  deferred    LABS:  Data reviewed      ASSESSMENT:      Principal Problem:    Hypertensive urgency  Strokelike symptoms  Suspected left MCA  stroke  Comorbidities present and past as listed below  Patient Active Problem List   Diagnosis    Left renal artery stenosis (HCC)    Hypoxia    Right bundle branch block    Non-STEMI (non-ST elevated myocardial infarction) [facility])    Essential hypertension, benign    Hypertensive urgency           PLAN:  May transfer to stepdown  Check echocardiogram/results reviewed  Check MRI of the brain/unable to perform due to patient's inability to lie flat  Check swallow study/fairly unremarkable  BP control  Resume home medications  Questions answered to their satisfaction  Neurology consult/input appreciated  Dual antiplatelet therapy  Risk modification strategies discussed  She will need subacute rehab  Giovanni Smith MD  4:27 PM  3/5/2023

## 2023-03-05 NOTE — CARE COORDINATION
Met with patient and son, Deloris Aguillon at bedside to provide [facility] list. They would like a referral made to [facility] should patient meet criteria for a [facility]ETT; will review the list for alternate choices if needed. Call made to the therapy department for patient to be seen tomorrow morning for PT/OT lei. The Plan for Transition of Care is related to the following treatment goals: discharge planning    The Patient and/or patient representative Ziyad Price was provided with a choice of provider and agrees   with the discharge plan. [x] Yes [] No    Freedom of choice list was provided with basic dialogue that supports the patient's individualized plan of care/goals, treatment preferences and shares the quality data associated with the providers.  [x] Yes [] No     Scarlett Blanton, MSW, LSW [phone]

## 2023-03-05 NOTE — CARE COORDINATION
Spoke with nurse, patient to discharge home today and requires 2L NC; nurse will get order for O2. Call made to [facility] on-call, no answer and left detailed message regarding the referral; awaiting return call. 9:45A  Called the floor regarding O2 orders, advised patient not discharging home today and family feels she needs rehab. PT/OT ordered, EVERETT list provided.     Humaira Maynard, MSW, LSW [phone]

## 2023-03-06 VITALS
HEIGHT: 68 IN | DIASTOLIC BLOOD PRESSURE: 63 MMHG | SYSTOLIC BLOOD PRESSURE: 179 MMHG | BODY MASS INDEX: 23.19 KG/M2 | WEIGHT: 153 LBS | OXYGEN SATURATION: 100 % | RESPIRATION RATE: 18 BRPM | HEART RATE: 60 BPM | TEMPERATURE: 97.4 F

## 2023-03-06 LAB
ALBUMIN SERPL-MCNC: 3.8 G/DL (ref 3.5–5.2)
ALP BLD-CCNC: 162 U/L (ref 35–104)
ALT SERPL-CCNC: 11 U/L (ref 0–32)
ANION GAP SERPL CALCULATED.3IONS-SCNC: 9 MMOL/L (ref 7–16)
AST SERPL-CCNC: 14 U/L (ref 0–31)
BASOPHILS ABSOLUTE: 0.04 E9/L (ref 0–0.2)
BASOPHILS RELATIVE PERCENT: 0.7 % (ref 0–2)
BILIRUB SERPL-MCNC: 0.3 MG/DL (ref 0–1.2)
BUN BLDV-MCNC: 29 MG/DL (ref 6–23)
CALCIUM SERPL-MCNC: 9 MG/DL (ref 8.6–10.2)
CHLORIDE BLD-SCNC: 105 MMOL/L (ref 98–107)
CO2: 30 MMOL/L (ref 22–29)
CREAT SERPL-MCNC: 1 MG/DL (ref 0.5–1)
EOSINOPHILS ABSOLUTE: 0.18 E9/L (ref 0.05–0.5)
EOSINOPHILS RELATIVE PERCENT: 2.9 % (ref 0–6)
GFR SERPL CREATININE-BSD FRML MDRD: 54 ML/MIN/1.73
GLUCOSE BLD-MCNC: 90 MG/DL (ref 74–99)
HCT VFR BLD CALC: 46.1 % (ref 34–48)
HEMOGLOBIN: 14 G/DL (ref 11.5–15.5)
IMMATURE GRANULOCYTES #: 0.02 E9/L
IMMATURE GRANULOCYTES %: 0.3 % (ref 0–5)
LYMPHOCYTES ABSOLUTE: 1.15 E9/L (ref 1.5–4)
LYMPHOCYTES RELATIVE PERCENT: 18.8 % (ref 20–42)
MCH RBC QN AUTO: 28.8 PG (ref 26–35)
MCHC RBC AUTO-ENTMCNC: 30.4 % (ref 32–34.5)
MCV RBC AUTO: 94.9 FL (ref 80–99.9)
METER GLUCOSE: 105 MG/DL (ref 74–99)
METER GLUCOSE: 89 MG/DL (ref 74–99)
MONOCYTES ABSOLUTE: 0.66 E9/L (ref 0.1–0.95)
MONOCYTES RELATIVE PERCENT: 10.8 % (ref 2–12)
NEUTROPHILS ABSOLUTE: 4.07 E9/L (ref 1.8–7.3)
NEUTROPHILS RELATIVE PERCENT: 66.5 % (ref 43–80)
PDW BLD-RTO: 12.9 FL (ref 11.5–15)
PLATELET # BLD: 185 E9/L (ref 130–450)
PMV BLD AUTO: 10 FL (ref 7–12)
POTASSIUM SERPL-SCNC: 4.7 MMOL/L (ref 3.5–5)
RBC # BLD: 4.86 E12/L (ref 3.5–5.5)
SODIUM BLD-SCNC: 144 MMOL/L (ref 132–146)
TOTAL PROTEIN: 6.3 G/DL (ref 6.4–8.3)
WBC # BLD: 6.1 E9/L (ref 4.5–11.5)

## 2023-03-06 PROCEDURE — 97530 THERAPEUTIC ACTIVITIES: CPT

## 2023-03-06 PROCEDURE — 97535 SELF CARE MNGMENT TRAINING: CPT

## 2023-03-06 PROCEDURE — 36415 COLL VENOUS BLD VENIPUNCTURE: CPT

## 2023-03-06 PROCEDURE — 6370000000 HC RX 637 (ALT 250 FOR IP): Performed by: INTERNAL MEDICINE

## 2023-03-06 PROCEDURE — 97165 OT EVAL LOW COMPLEX 30 MIN: CPT

## 2023-03-06 PROCEDURE — 80053 COMPREHEN METABOLIC PANEL: CPT

## 2023-03-06 PROCEDURE — 97161 PT EVAL LOW COMPLEX 20 MIN: CPT

## 2023-03-06 PROCEDURE — 6370000000 HC RX 637 (ALT 250 FOR IP): Performed by: PHYSICIAN ASSISTANT

## 2023-03-06 PROCEDURE — 85025 COMPLETE CBC W/AUTO DIFF WBC: CPT

## 2023-03-06 PROCEDURE — 82962 GLUCOSE BLOOD TEST: CPT

## 2023-03-06 RX ORDER — ASPIRIN 81 MG/1
81 TABLET, CHEWABLE ORAL DAILY
Qty: 30 TABLET | Refills: 3 | Status: SHIPPED | OUTPATIENT
Start: 2023-03-07

## 2023-03-06 RX ORDER — AMLODIPINE BESYLATE 5 MG/1
5 TABLET ORAL DAILY
Qty: 30 TABLET | Refills: 5 | Status: SHIPPED | OUTPATIENT
Start: 2023-03-06

## 2023-03-06 RX ORDER — CLOPIDOGREL BISULFATE 75 MG/1
75 TABLET ORAL DAILY
Qty: 30 TABLET | Refills: 3 | Status: SHIPPED | OUTPATIENT
Start: 2023-03-07

## 2023-03-06 RX ADMIN — ACETAMINOPHEN 500 MG: 500 TABLET, FILM COATED ORAL at 03:48

## 2023-03-06 RX ADMIN — PANTOPRAZOLE SODIUM 40 MG: 40 TABLET, DELAYED RELEASE ORAL at 06:23

## 2023-03-06 RX ADMIN — ATORVASTATIN CALCIUM 10 MG: 10 TABLET, FILM COATED ORAL at 08:55

## 2023-03-06 RX ADMIN — METFORMIN HYDROCHLORIDE 500 MG: 500 TABLET ORAL at 08:56

## 2023-03-06 RX ADMIN — LOSARTAN POTASSIUM 100 MG: 50 TABLET, FILM COATED ORAL at 08:55

## 2023-03-06 RX ADMIN — CLOPIDOGREL BISULFATE 75 MG: 75 TABLET ORAL at 08:56

## 2023-03-06 RX ADMIN — ACETAMINOPHEN 500 MG: 500 TABLET, FILM COATED ORAL at 14:14

## 2023-03-06 RX ADMIN — SERTRALINE 100 MG: 100 TABLET, FILM COATED ORAL at 08:55

## 2023-03-06 RX ADMIN — ASPIRIN 81 MG 81 MG: 81 TABLET ORAL at 08:56

## 2023-03-06 RX ADMIN — METOPROLOL TARTRATE 50 MG: 50 TABLET, FILM COATED ORAL at 08:55

## 2023-03-06 RX ADMIN — CETIRIZINE HYDROCHLORIDE 5 MG: 5 TABLET ORAL at 08:55

## 2023-03-06 ASSESSMENT — PAIN DESCRIPTION - ONSET: ONSET: ON-GOING

## 2023-03-06 ASSESSMENT — PAIN DESCRIPTION - DESCRIPTORS
DESCRIPTORS: ACHING;DISCOMFORT;SORE
DESCRIPTORS: SORE;ACHING

## 2023-03-06 ASSESSMENT — PAIN DESCRIPTION - PAIN TYPE: TYPE: CHRONIC PAIN

## 2023-03-06 ASSESSMENT — PAIN SCALES - GENERAL
PAINLEVEL_OUTOF10: 6
PAINLEVEL_OUTOF10: 4
PAINLEVEL_OUTOF10: 4

## 2023-03-06 ASSESSMENT — PAIN DESCRIPTION - LOCATION
LOCATION: BACK
LOCATION: BACK

## 2023-03-06 ASSESSMENT — PAIN DESCRIPTION - FREQUENCY: FREQUENCY: CONTINUOUS

## 2023-03-06 ASSESSMENT — PAIN DESCRIPTION - ORIENTATION: ORIENTATION: POSTERIOR;UPPER;LOWER

## 2023-03-06 NOTE — PROGRESS NOTES
OCCUPATIONAL THERAPY INITIAL EVALUATION    [facility]  [address], [place]        Date:3/6/2023                                                  Patient Name: Nora Sun    MRN: [id number]    : 1935    Room: [address] [place] [address]      Evaluating OT: JACQUE Velásquez OTR/L; [id number]      Referring Provider: Travis Dubon MD    Specific Provider Orders/Date: OT Eval and Treat 3/5/23      Diagnosis:  Hypertensive urgency    Surgery: none this admission     Pertinent Medical History:  has a past medical history of Diabetes (HCC), Hypertension, Left renal artery stenosis (HCC), and Renal artery stenosis (HCC).      Reason for Admission: dysarthria, R sided weakness    Recommended Adaptive Equipment:  TBD pending progression/discharge plan     Precautions:  Fall Risk, O2     Assessment of current deficits:    [x] Functional mobility  [x]ADLs  [x] Strength               [x]Cognition    [x] Functional transfers   [x] IADLs         [x] Safety Awareness   [x]Endurance    [x] Fine Coordination              [x] Balance      [] Vision/perception   []Sensation     []Gross Motor Coordination  [] ROM  [] Delirium                   [] Motor Control     OT PLAN OF CARE   OT POC based on physician orders, patient diagnosis and results of clinical assessment    Frequency/Duration: 1-3 days/wk for 2 weeks PRN   Specific OT Treatment Interventions to include:   * Instruction/training on adapted ADL techniques and AE recommendations to increase functional independence within precautions       * Training on energy conservation strategies, correct breathing pattern and techniques to improve independence/tolerance for self-care routine  * Functional transfer/mobility training/DME recommendations for increased independence, safety, and fall prevention  * Patient/Family education to increase follow through with safety techniques and functional independence  *  Recommendation of environmental modifications for increased safety with functional transfers/mobility and ADLs  * Cognitive retraining/development of therapeutic activities to improve problem solving, judgement, memory, and attention for increased safety/participation in ADL/IADL tasks  * Therapeutic exercise to improve motor endurance, ROM, and functional strength for ADLs/functional transfers  * Therapeutic activities to facilitate/challenge dynamic balance, stand tolerance for increased safety and independence with ADLs    Home Living: Pt lives alone in [address] home with 1 [facility] to enter.    Bathroom setup: walk in shower with shower chair and grab bars, high rise commode   Equipment owned: rollator, shower chair    Prior Level of Function: Mod Ind with ADLs - using shower chair for bathing tasks, reports increased difficulty with ADLs d/t fatigue  Max A <> Min A with IADLs - pt reports family assists with laundry, groceries - pt completes cooking tasks   ambulated with rollator prior  Driving: no - family provides all tx    Pain Level: chronic back pain  Cognition: A&O: 4/4 - noted slurred speech compared to baseline, no increased difficulty with understanding patient  Follows single step directions   Memory:  good   Sequencing:  fair   Problem solving:  fair   Judgement/safety:  fair     Functional Assessment:  AM-PAC Daily Activity Raw Score: 15/24   Initial Eval Status  Date: 3/6/23 Treatment Status  Date: STGs = LTGs  Time frame: 10-14 days   Feeding Set Up  Independent    Grooming Stand by Assist   Hand hygiene standing sink side  Independent    UB Dressing Minimal Assist   Independent    LB Dressing Moderate Assist   Moderate [place]    Bathing Moderate Assist  Moderate [place]    Toileting Moderate Assist   Lower surface transfer with use of grab bars  Moderate [place]    Bed Mobility  Log Roll: NT  Supine to sit: NT   Sit to supine: NT   Supine to sit: Moderate [place]   Sit to supine: Moderate [place]    Functional Transfers Sit to stand: SBA   Stand to sit:SBA  Stand pivot: SBA with rollator  Commode: Mod A from lower surface commode  Sit to stand:Mod Ind   Stand to sit:Mod Ind  Stand pivot: Mod Ind  Commode: Mod Ind    Functional Mobility SBA > Min A with rollator   within household distance  Mod Ind with ww    Balance Sitting:     Static - SBA     Dynamic - SBA  Standing: SBA with rollator  Sitting:  Static: IND  Dynamic: IND  Standing: Mod Ind with ww   Activity Tolerance FAIR  GOOD   Visual/  Perceptual Glasses: yes - all the time          Vitals   SpO2 at rest on 2L NC: 75% however difficulty obtaining reading (RN aware) pt not symptomatic   SpO2 on 4L NC during functional mobility: 91%  SpO2 end of session on 4L NC: 99%    HR: 40-61 during session         BUE  ROM/Strength/  Fine motor Coordination Hand dominance: Right     RUE: ROM WFL     Strength: 4-/5      Strength: fair     Coordination:  fair     LUE: ROM WFL     Strength: 4-/5      Strength: fair     Coordination:  fair  increase BUE muscle strength for improved indep with functional transfers       Hearing: WFL   Sensation:  No c/o numbness or tingling   Tone: WFL   Edema: unremarkable    Patient/Family Goal: pt goal is to get stronger   Based on patient's functional performance as stated below and level of assistance needed prior to admission, this therapist believes that the patient would benefit from further skilled OT following hospital stay in an effort to increase safety, functional independence, and quality of life.    Comment: Cleared by RN to see pt. Upon arrival patient seated in bedside chair with family present and agreeable to OT session. At end of session, patient seated in bedside chair with family present with call light and phone within reach, all lines and tubes intact.  Overall patient demonstrated decreased independence and safety during completion of ADL/functional transfer/mobility tasks.   Pt would benefit from continued skilled OT to increase safety and independence with completion of ADL/IADL tasks for functional independence and quality of life. Treatment: Pt required vc's for proper technique/safety with hand placement/body mechanics/posture for bed mobility/ADLs/functional tranfers/mobility/ww management. Pt required vc's for sequencing/initiation of ADLs/functional transfers. Pt able to  sit EOB ~10 mins and at sink ~2 mins to increase core strength/balance/activity tolerance for ease with ADLs. Pt required increased time to complete ADLs/functional transfers due to management of multiple lines (O2 tank). Pt completed lower surface transfer with use of grab bars and all pericare standing increased difficulty with sit to stand from low surface with grab bar use. Pt required skilled monitoring of SpO2 during session and education on energy conservation techniques. Pt required rest breaks during session and educated on pursed lip breathing. Pt appeared to have tolerated session well and appears motivated/cooperative/pleasant. Pt instructed on use of call light for assistance and fall prevention. Pt demo'ing fair understanding of education provided. Continue to educate. Rehab Potential: Good  for established goals       LTG: maximize independence with ADLs to return to PLOF    Patient and/or family were instructed on functional diagnosis, prognosis/goals and OT plan of care. Demonstrated FAIR understanding. [] Malnutrition indicators have been identified and nursing has been notified to ensure a dietitian consult is ordered. Eval Complexity: Low    Evaluation time includes thorough review of current medical information, gathering information on past medical & social history & PLOF, completion of standardized testing, informal observation of tasks, consultation with other medical professions/disciplines, assessment of data & development of POC/goals.      Time In: 1151  Time Out: 0950  Total Treatment Time: 15    Min Units   OT Eval Low 51153  x     OT Eval Medium 11464      OT Eval High 52506      OT Re-Eval [id number]       Therapeutic Ex 96570       Therapeutic Activities 53814  15  1   ADL/Self Care 85919       Orthotic Management 82297       Manual 00033     Neuro Re-Ed 18604       Non-Billable Time          Evaluation Time additionally includes thorough review of current medical information, gathering information on past medical history/social history and prior level of function, interpretation of standardized testing/informal observation of tasks, assessment of data and development of plan of care and goals.     JACQUE Bhatt OTR/L; [id number]

## 2023-03-06 NOTE — PROGRESS NOTES
Physical Therapy  Physical Therapy Initial Assessment     Name: Srikanth Ortiz  : 1935  MRN: [id number]      Date of Service: 3/6/2023    Evaluating PT:  Colton Paz PT, DPT, [id number]    Room #:  0513/1248-X  Diagnosis:  Hypertensive urgency [I16.0]  PMHx/PSHx:    Past Medical History:   Diagnosis Date    Diabetes [facility])     Hypertension     Left renal artery stenosis ([address] 2018    Renal artery stenosis [facility])       Past Surgical History:   Procedure Laterality Date    BLEPHAROPLASTY Bilateral     CARDIAC CATHETERIZATION  10/09/2020    Dr Claude Arnold Right     cataract    HAND SURGERY Bilateral     trigger thumb    HYSTERECTOMY (CERVIX STATUS UNKNOWN)      KNEE SURGERY        Procedure/Surgery:  None this admission  Precautions:  Fall Risk, O2  Equipment Needs:  TBD    SUBJECTIVE:    Pt lives alone in a 1 story home with 1 stairs to enter and 0 rail. Bed is on 1 floor and bath is on 1 floor. Pt ambulated with Rollator PTA. OBJECTIVE:   Initial Evaluation  Date: 3/6/23 Treatment Short Term/ Long Term   Goals   AM-PAC 6 Clicks 36/50     Was pt agreeable to Eval/treatment? yes     Does pt have pain? 9/10 back pain that increased with movement     Bed Mobility  Rolling: NT  Supine to sit: NT  Sit to supine: NT  Scooting: NT  Rolling: Independent   Supine to sit: Independent   Sit to supine: Independent   Scooting: Independent    Transfers Sit to stand: SBA  Stand to sit: SBA  Stand pivot: SBA  Sit to stand: Independent   Stand to sit:  Independent   Stand pivot: mod Independent with Rollator   Ambulation    50'x2 feet with Rollator SBA  >150 feet with rollator Mod Independent    Stair negotiation: ascended and descended NT  NA   ROM BUE:  Refer to OT note  BLE:  WFL     Strength BUE:  Refer to OT note  BLE:  4-/5  Improve by 1/3 MMT   Balance Sitting EOB:  NT  Dynamic Standing:  SBA with Rollator  Sitting EOB:  Independent   Dynamic Standing:  mod Independent with Rollator     Pt is A & O x 4  Sensation:  Pt reports numbness and tingling to her toes  Edema:  unremarkable    Vitals:  Blood Pressure at rest -- Blood Pressure post session --   Heart Rate at rest -- Heart Rate post session 67 bpm   SPO2 at rest: unable to obtain SPO2 post session 95% RA     Patient education  Pt educated on role of PT, safety with functional mobility, importance of monitoring SPO2. Patient response to education:   Pt verbalized understanding Pt demonstrated skill Pt requires further education in this area   x x x     ASSESSMENT:    Conditions Requiring Skilled Therapeutic Intervention:    [x]Decreased strength     []Decreased ROM  [x]Decreased functional mobility  []Decreased balance   [x]Decreased endurance   []Decreased posture  [x]Decreased sensation  []Decreased coordination   []Decreased vision  []Decreased safety awareness   [x]Increased pain       Comments:  Medically cleared by RN for session. Pt was seated in bedside chair upon PT entry and agreeable to participate. Pt's family was present throughout session. Monitored SPO2 throughout session as able, and weaned O2 to pt tolerance. Pt was able to complete sit<>stand transfer from low chair with no hands on assistance. Ambulated in hallway with rollator with slow but steady gait. Pt reported that distance is limited by her back pain and she takes frequent seated breaks on rollator when ambulating further distances. Pt was returned to room and seated in bedside chair, SPO2 94-95% on RA. Educated pt on locking rollator before sitting, verbalized understanding. All needs were met and call light in reach at conclusion of session. RN notified.     Treatment:  Patient practiced and was instructed in the following treatment:    Transfer training - pt was given verbal and tactile cues to facilitate proper hand placement, technique and safety during sit to stand and stand to sit as well as provided with physical assistance. Gait training- pt was given verbal and tactile cues to facilitate safety during ambulation as well as provided with physical assistance. Skilled monitoring of vitals and symptoms throughout session. Pt's/ family goals   1. To return to PLOF    Prognosis is good for reaching above PT goals. Patient and or family understand(s) diagnosis, prognosis, and plan of care. yes    PHYSICAL THERAPY PLAN OF CARE:    PT POC is established based on physician order and patient diagnosis     Referring provider/PT Order:    03/05/23 0930  PT eval and treat  Start:  03/05/23 0930,   End:  03/05/23 0930,   ONE TIME,   Standing Count:  1 Occurrences,   R         Boo Ruiz MD     Diagnosis:  Hypertensive urgency [I16.0]  Specific instructions for next treatment:  improve strength and endurance. Current Treatment Recommendations:     [x] Strengthening to improve independence with functional mobility   [] ROM to improve independence with functional mobility   [] Balance Training to improve static/dynamic balance and to reduce fall risk  [x] Endurance Training to improve activity tolerance during functional mobility   [x] Transfer Training to improve safety and independence with all functional transfers   [x] Gait Training to improve gait mechanics, endurance and assess need for appropriate assistive device  [x] Stair Training in preparation for safe discharge home and/or into the community   [x] Positioning to prevent skin breakdown and contractures  [x] Safety and Education Training   [x] Patient/Caregiver Education   [] HEP  [x] Other     PT long term treatment goals are located in above grid    Frequency of treatments: 2-5x/week x 1-2 weeks.     Time in  0840  Time out  0910    Total Treatment Time  15 minutes     Evaluation Time includes thorough review of current medical information, gathering information on past medical history/social history and prior level of function, completion of standardized testing/informal observation of tasks, assessment of data and education on plan of care and goals.     CPT codes:  [x] Low Complexity PT evaluation 19049  [] Moderate Complexity PT evaluation 28074  [] High Complexity PT evaluation 75904  [] PT Re-evaluation 20023  [] Gait training 84726 0 minutes  [] Manual therapy 06347 0 minutes  [x] Therapeutic activities 74758 15 minutes  [] Therapeutic exercises 87710 0 minutes  [] Neuromuscular reeducation 69580 0 minutes     Minor Portillo PT, DPT  [id number]

## 2023-03-06 NOTE — CARE COORDINATION
03/06/23 Update CM Note; met with patient and family at the bedside regarding therapy evaluations. Patient was able to walk 100ft plus with wheeled walker. She has oxygen sat 95% on room air. She is agreeable to homecare at discharge. She states she would like [facility] homecare per her choice of the list. Referral sent to Jacquie at [facility] for review. Patient is currently sitting in the chair without complaints. Will follow for readiness to discharge.  Electronically signed by Santiago Silva RN CM on 3/6/2023 at 10:08 AM

## 2023-04-20 PROBLEM — R47.81 SLURRED SPEECH: Status: ACTIVE | Noted: 2023-01-01

## 2023-04-22 PROBLEM — I63.211 CEREBROVASCULAR ACCIDENT (CVA) DUE TO STENOSIS OF RIGHT VERTEBRAL ARTERY (HCC): Status: ACTIVE | Noted: 2023-01-01

## 2023-04-23 PROBLEM — G70.00 MG (MYASTHENIA GRAVIS) (HCC): Status: ACTIVE | Noted: 2023-01-01

## 2023-04-24 NOTE — CARE COORDINATION
CM note. Hospice consult noted. Met with pt daughters and son in law at the bedside. Hospice choices provided. They would like to use [facility]. Referral called to [facility] with Doris Sexton. FOUZIA/BLANCA to follow. Amaris Saleh [phone].

## 2023-04-24 NOTE — PLAN OF CARE
EKG performed by E staff with no epic order on 4/22/2023. Order placed in care path on 4/24/2023.   Spoke to Humaira that no repeat needed with this order at this time

## 2023-04-24 NOTE — PLAN OF CARE
Notes reviewed. Family does not want IVIG and is deciding to go with hospice who has been consulted. Neurology will sign off.

## 2023-04-24 NOTE — PROGRESS NOTES
HOSPICE [facility]    Consult received. Chart reviewed. Met family at bedside. Emotional support given. The hospice benefit and philosophy were explained including that hospice is end of life care in which, per Medicare, a patient has a terminal diagnosis that life expectancy would be 6 months or less. Explained that once in hospice care, all aggressive treatments would be stopped and allow nature to takes its course with focus on comfort care for the patient. Explained [facility] with room/board private pay unless patient has Medicaid and the [facility] for short-term symptom management and respite. Spoke with Bob Davidson, MSN-APRN-CNP, AGACNP-BC who accepted patient for inpatient level of care hospice. Received bed 108 with a requested transport of 1615. [facility] able to transport at 1615. Ambulance forms placed in soft chart. Updated CM, bedside nurse, and family. Requested discharge order be obtained. Please leave IV and lam in place. Gave nurse to nurse report to hospice house. Signed consents with daughter, Meme Chavez.     Electronically signed by Van Pack RN on 4/24/2023 at [facility]  [phone]; [phone]
HOSPICE [facility]    Consult received. Chart reviewed. Family at bedside. Patient is on 15 Liters non re breather. She is restless and reaching out. Asked for patient to be medicated. The hospice benefit and philosophy were explained including that hospice is end of life care in which, per Medicare, a patient has a terminal diagnosis that life expectancy would be 6 months or less. Explained that once in hospice care, all aggressive treatments would be stopped and allow nature to takes its course with focus on comfort care for the patient. Explained at [facility] with room/board private pay unless patient has Medicaid and the [facility] for short-term symptom management and respite.         Electronically signed by Anahi Valerio RN on 4/24/2023 at 1:35 PM  [phone]; [phone]
Per hospice request, pt to be discharged with Ivs left in. External cath removed, pt discharged with transport at this time. Family took all belongings with them.
Physician Progress Note      PATIENT:               Steff Shah  CSN #:                  [id number]  :                       1935  ADMIT DATE:       2023 11:00 AM  [address] [place] DATE:  RESPONDING  PROVIDER #:        Josette Carty MD          QUERY TEXT:    Dear Provider,    Pt admitted with slurred speech. Noted documentation of myasthenia gravis by   neurology. 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 There was some anisocoria   therefore question partial  Wallenberg's however MRI unrevealing for lateral medullary infarct therefore   suspect myasthenia gravis  Head imaging: negative for acute findings  Treatment: Imaging, IVIG, Neurology    Thank you,  Kenneth Gilmore RN  Clinical Documentation Improvement  [phone]  Options provided:  -- Myasthenia gravis, POA. -- Myasthenia gravis ruled out  -- Other - I will add my own diagnosis  -- Disagree - Not applicable / Not valid  -- Disagree - Clinically unable to determine / Unknown  -- Refer to Clinical Documentation Reviewer    PROVIDER RESPONSE TEXT:    The diagnosis of myasthenia gravis, POA.     Query created by: Anayeli Melo on 2023 8:59 AM      Electronically signed by:  Josette Carty MD 2023 11:17 AM
tablet by mouth daily  [DISCONTINUED] amLODIPine (NORVASC) 5 MG tablet, Take 1 tablet by mouth daily (Patient taking differently: Take 0.5 tablets by mouth daily)  [DISCONTINUED] metoprolol tartrate (LOPRESSOR) 50 MG tablet, Take 50 mg by mouth 2 times daily  [DISCONTINUED] loratadine (CLARITIN) 10 MG capsule, Take 10 mg by mouth daily  [DISCONTINUED] omeprazole (PRILOSEC) 40 MG delayed release capsule, Take 40 mg by mouth daily  [DISCONTINUED] sertraline (ZOLOFT) 50 MG tablet, Take 2 tablets by mouth daily  [DISCONTINUED] atorvastatin (LIPITOR) 10 MG tablet, Take 10 mg by mouth daily  [DISCONTINUED] metFORMIN (GLUCOPHAGE) 500 MG tablet, Take 1 tablet by mouth 2 times daily (with meals)  [DISCONTINUED] ezetimibe (ZETIA) 10 MG tablet, Take 1 tablet by mouth nightly  [DISCONTINUED] losartan (COZAAR) 100 MG tablet, Take 100 mg by mouth daily    Allergies:    Patient has no known allergies. Social History:    reports that she quit smoking about 3 years ago. Her smoking use included cigarettes. She smoked an average of .5 packs per day. She has never used smokeless tobacco. She reports that she does not drink alcohol and does not use drugs. Family History:   family history is not on file. REVIEW OF SYSTEMS:  As above in the HPI, otherwise negative    PHYSICAL EXAM:    Vitals:  BP (!) 82/48   Pulse (!) 125   Temp 97.8 °F (36.6 °C) (Axillary)   Resp 20   Ht 5' 3\" (1.6 m)   Wt 153 lb (69.4 kg)   SpO2 100%   BMI 27.10 kg/m²     General: Doing poorly  Dysarthric  HEENT:  Normocephalic, atraumatic. Pupils equal, round, reactive to light. No scleral icte  No nasal discharge. Neck:  Supple  Heart:  RRR, no murmurs, gallops, rubs  Lungs:  CTA bilaterally, bilat symmetrical expansion, no wheeze, rales, or rhonchi  Abdomen:   Bowel sounds present, soft, nontender, no masses, no organomegaly, no peritoneal signs  Extremities:  No clubbing, cyanosis, or edema  Skin:  Warm and dry, no open lesions or rash  Neuro:

## 2023-04-24 NOTE — DISCHARGE INSTR - COC
Continuity of Care Form    Patient Name: Cindi Fajardo   :  1935  MRN:  [id number]    Admit date:  2023  Discharge date:  ***    Code Status Order: Prior   Advance Directives:     Admitting Physician:  Migue Hooker MD  PCP: Sanjay Liu MD    Discharging Nurse: [facility] Unit/Room#: 8691/1784-P  Discharging Unit Phone Number: ***    Emergency Contact:   Extended Emergency Contact Information  Primary Emergency Contact: [address] Phone: [phone]  Mobile Phone: [phone]  Relation: Child  Secondary Emergency Contact: Bibiana Valenzuela  Address: [address] [place] [address] Phone: [phone]  Relation: Child    Past Surgical History:  Past Surgical History:   Procedure Laterality Date    BLEPHAROPLASTY Bilateral     CARDIAC CATHETERIZATION  10/09/2020    Dr Shanon Bernard Right     cataract    HAND SURGERY Bilateral     trigger thumb    HYSTERECTOMY (CERVIX STATUS UNKNOWN)      KNEE SURGERY         Immunization History:   Immunization History   Administered Date(s) Administered    COVID-19, PFIZER PURPLE top, DILUTE for use, (age 15 y+), 30mcg/0.3mL 2021, 2021    TD 2LF, TDVAX, (age 7y+), IM, 0.5mL 2022       Active Problems:  Patient Active Problem List   Diagnosis Code    Left renal artery stenosis (HCC) I70.1    Hypoxia R09.02    Right bundle branch block I45.10    Non-STEMI (non-ST elevated myocardial infarction) ([address] I21.4    Essential hypertension, benign I10    Hypertensive urgency I16.0    Slurred speech R47.81    Cerebrovascular accident (CVA) due to stenosis of right vertebral artery ([address] I63.211    MG (myasthenia gravis) ([address] G70.00       Isolation/Infection:   Isolation            No Isolation          Patient Infection Status       Infection Onset Added Last Indicated Last Indicated By Review Planned Expiration Resolved

## 2024-01-30 NOTE — PROGRESS NOTES
----- Message from Carole Calixto, DO sent at 1/30/2024  1:05 PM CST -----  Blood counts have decreased from December but are not critical.  Make sure she is only taking her Plavix once daily   SPEECH/LANGUAGE PATHOLOGY  VIDEOFLUOROSCOPIC STUDY OF SWALLOWING (MBS)   and PLAN OF CARE    PATIENT NAME:  Luis Wyatt  (female)     MRN:  [id number]    :  1935  (80 y.o.)  STATUS:  Inpatient: Room 8504/8504-B    TODAY'S DATE:  3/3/2023  REFERRING PROVIDER:  Dr. Bobo Katz: [place] modified barium swallow with video  Date of order:  3-3-23   REASON FOR REFERRAL: CVA   EVALUATING THERAPIST: BULMARO Nayak      RESULTS:      DYSPHAGIA DIAGNOSIS:  minimal-mild oropharyngeal phase dysphagia     DIET RECOMMENDATIONS:  Soft and bite size consistency solids (IDDSI level 6) with  thin liquids (IDDSI level 0)    FEEDING RECOMMENDATIONS:    Assistance level:  No assistance needed     Compensatory strategies recommended: Small bites/sips and No straw       SPEECH THERAPY  PLAN OF CARE   The dysphagia POC is established based on physician order and dysphagia diagnosis    Dysphagia therapy is not recommended       Conditions Requiring Skilled Therapeutic Intervention for dysphagia:    Not applicable    SPECIFIC DYSPHAGIA INTERVENTIONS TO INCLUDE:     not applicable    Specific instructions for next treatment:  not applicable   Treatment Goals:    Short Term Goals:  Not applicable no therapy warranted     Long Term Goals:   Not applicable no therapy warranted      Patient/family Goal:    not applicable                  ADMITTING DIAGNOSIS: Hypertensive urgency [I16.0]     VISIT DIAGNOSIS:   Visit Diagnoses         Codes    Weakness of both upper extremities    -  Primary R29.898    Lip numbness     R20.0                PATIENT REPORT/COMPLAINT: denies difficulty swallowing    PRIOR LEVEL OF SWALLOW FUNCTION:    Past History of Dysphagia?:  none reported    Home diet: Soft and bite size consistency solids (IDDSI level 6) with  thin liquids (IDDSI level 0)  Current Diet Order: ADULT DIET;  Regular  PROCEDURE:  Consistencies Administered During the Evaluation   Liquids: thin liquid and nectar thick liquid   Solids:  pureed foods and solid foods      Method of Intake:   cup, straw, spoon  Self fed, Fed by clinician      Position:   Seated, upright, Lateral plane    INSTRUMENTAL ASSESSMENT:    ORAL PREP/ ORAL PHASE:    Decreased mastication due to:  poor/missing dentition   and disorganized chewing pattern     PHARYNGEAL PHASE:     ONSET TIME       Onset time of the pharyngeal swallow was adequate       PHARYNGEAL RESIDUALS        Vallecula/Pharyngeal Wall           Reduced tongue base retraction resulting in residuals in vallecula and/or posterior pharyngeal wall for pureed foods which mostly cleared with spontaneous double swallow       Pyriform Sinuses      No significant residuals were noted in the pyriform sinuses     LARYNGEAL PENETRATION   Laryngeal penetration occurred in the absence of aspiration DURING the swallow for thin liquid due to  delayed laryngeal closure which cleared from the laryngeal vestibule spontaneously (transient).  Laryngeal penetration was minimal and occurred inconsistently   an absent cough/throat clear was noted    ASPIRATION  Aspiration was not present during this evaluation    PENETRATION-ASPIRATION SCALE (PAS):  THIN 2 = Material enters the airway, remains above vocal folds, and is ejected from the airway   MILDLY THICK 1 = Material does not enter the airway  MODERATELY THICK item not administered  PUREE 1 = Material does not enter the airway  HARD SOLID item not administered       COMPENSATORY STRATEGIES    Compensatory strategies were not attempted      STRUCTURAL/FUNCTIONAL ANOMALIES   No structural/functional anomalies were noted    CERVICAL ESOPHAGEAL STAGE :     The cervical esophagus appeared adequate          ___________    Cognition:   Within functional limits for this exam    Oral Peripheral Examination   Generalized oral weakness    Current Respiratory Status   2 liters O2 via nasal cannula     Parameters of Speech Production  Respiration:  Shortness of breath  Quality:   Within functional limits  Intensity: Within functional limits    Pain: No pain reported. EDUCATION:   The Speech Language Pathologist (SLP) completed education regarding results of evaluation and that intervention is not warranted at this time. Learner: Patient  Education: Reviewed results and recommendations of this evaluation and Reviewed diet and strategies  Evaluation of Education:  Donya Manning understanding    This plan may be re-evaluated and revised as warranted. Evaluation Time includes thorough review of current medical information, gathering information on past medical history/social history and prior level of function, completion of standardized testing/informal observation of tasks, assessment of data and education on plan of care and goals. [x]The admitting diagnosis and active problem list, have been reviewed prior to initiation of this evaluation.     CPT Code: 57573  dysphagia study    ACTIVE PROBLEM LIST:   Patient Active Problem List   Diagnosis    Left renal artery stenosis (HCC)    Hypoxia    Right bundle branch block    Non-STEMI (non-ST elevated myocardial infarction) ([facility]    Essential hypertension, benign    Hypertensive urgency